# Patient Record
Sex: FEMALE | Race: WHITE | Employment: OTHER | URBAN - METROPOLITAN AREA
[De-identification: names, ages, dates, MRNs, and addresses within clinical notes are randomized per-mention and may not be internally consistent; named-entity substitution may affect disease eponyms.]

---

## 2017-05-21 PROBLEM — R07.89 ATYPICAL CHEST PAIN: Status: ACTIVE | Noted: 2017-05-21

## 2017-05-21 PROBLEM — N17.9 AKI (ACUTE KIDNEY INJURY) (HCC): Status: ACTIVE | Noted: 2017-05-21

## 2017-05-21 PROBLEM — Z79.01 CHRONIC ANTICOAGULATION: Status: ACTIVE | Noted: 2017-05-21

## 2017-05-21 PROBLEM — N30.00 ACUTE CYSTITIS WITHOUT HEMATURIA: Status: ACTIVE | Noted: 2017-05-21

## 2017-10-31 PROBLEM — G20 PARKINSON DISEASE (HCC): Status: ACTIVE | Noted: 2017-10-31

## 2017-10-31 PROBLEM — G20.A1 PARKINSON DISEASE: Status: ACTIVE | Noted: 2017-10-31

## 2018-04-01 ENCOUNTER — HOSPITAL ENCOUNTER (OUTPATIENT)
Dept: OTHER | Age: 68
Discharge: HOME | End: 2018-04-01
Attending: PSYCHIATRY & NEUROLOGY | Admitting: PSYCHIATRY & NEUROLOGY

## 2018-04-26 LAB
ALBUMIN SERPL-MCNC: 4.1 GM/DL (ref 3.4–5)
ALP BLD-CCNC: 67 IU/L (ref 40–128)
ALT SERPL-CCNC: 14 U/L (ref 10–40)
ANION GAP SERPL CALCULATED.3IONS-SCNC: 20 MMOL/L (ref 4–16)
AST SERPL-CCNC: 15 IU/L (ref 15–37)
BASOPHILS ABSOLUTE: 0.1 K/CU MM
BASOPHILS RELATIVE PERCENT: 0.8 % (ref 0–1)
BILIRUB SERPL-MCNC: 0.4 MG/DL (ref 0–1)
BUN BLDV-MCNC: 19 MG/DL (ref 6–23)
CALCIUM SERPL-MCNC: 10.1 MG/DL (ref 8.3–10.6)
CHLORIDE BLD-SCNC: 97 MMOL/L (ref 99–110)
CHOLESTEROL: 242 MG/DL
CO2: 24 MMOL/L (ref 21–32)
CREAT SERPL-MCNC: 1.1 MG/DL (ref 0.6–1.1)
DIFFERENTIAL TYPE: ABNORMAL
EOSINOPHILS ABSOLUTE: 0.2 K/CU MM
EOSINOPHILS RELATIVE PERCENT: 1.8 % (ref 0–3)
GFR AFRICAN AMERICAN: 60 ML/MIN/1.73M2
GFR NON-AFRICAN AMERICAN: 50 ML/MIN/1.73M2
GLUCOSE BLD-MCNC: 107 MG/DL (ref 70–99)
HCT VFR BLD CALC: 37.7 % (ref 37–47)
HDLC SERPL-MCNC: 47 MG/DL
HEMOGLOBIN: 12.3 GM/DL (ref 12.5–16)
IMMATURE NEUTROPHIL %: 0.4 % (ref 0–0.43)
LDL CHOLESTEROL DIRECT: 121 MG/DL
LYMPHOCYTES ABSOLUTE: 3.1 K/CU MM
LYMPHOCYTES RELATIVE PERCENT: 28.1 % (ref 24–44)
MCH RBC QN AUTO: 29.7 PG (ref 27–31)
MCHC RBC AUTO-ENTMCNC: 32.6 % (ref 32–36)
MCV RBC AUTO: 91.1 FL (ref 78–100)
MONOCYTES ABSOLUTE: 0.6 K/CU MM
MONOCYTES RELATIVE PERCENT: 5.5 % (ref 0–4)
NUCLEATED RBC %: 0 %
PDW BLD-RTO: 14.3 % (ref 11.7–14.9)
PLATELET # BLD: 322 K/CU MM (ref 140–440)
PMV BLD AUTO: 9.9 FL (ref 7.5–11.1)
POTASSIUM SERPL-SCNC: 3.5 MMOL/L (ref 3.5–5.1)
RBC # BLD: 4.14 M/CU MM (ref 4.2–5.4)
SEGMENTED NEUTROPHILS ABSOLUTE COUNT: 7.1 K/CU MM
SEGMENTED NEUTROPHILS RELATIVE PERCENT: 63.4 % (ref 36–66)
SODIUM BLD-SCNC: 141 MMOL/L (ref 135–145)
TOTAL IMMATURE NEUTOROPHIL: 0.04 K/CU MM
TOTAL NUCLEATED RBC: 0 K/CU MM
TOTAL PROTEIN: 7 GM/DL (ref 6.4–8.2)
TRIGL SERPL-MCNC: 449 MG/DL
TSH HIGH SENSITIVITY: 2.19 UIU/ML (ref 0.27–4.2)
WBC # BLD: 11.2 K/CU MM (ref 4–10.5)

## 2018-04-27 LAB
INR BLD: 1.24 INDEX
PROTHROMBIN TIME: 14.1 SECONDS (ref 9.12–12.5)

## 2018-04-29 LAB
INR BLD: 1.22 INDEX
PROTHROMBIN TIME: 13.9 SECONDS (ref 9.12–12.5)

## 2018-05-01 ENCOUNTER — HOSPITAL ENCOUNTER (OUTPATIENT)
Dept: OTHER | Age: 68
Discharge: HOME | End: 2018-05-01
Attending: PSYCHIATRY & NEUROLOGY | Admitting: PSYCHIATRY & NEUROLOGY

## 2018-05-01 LAB
INR BLD: 1.36 INDEX
PROTHROMBIN TIME: 15.7 SECONDS

## 2018-05-03 LAB
INR BLD: 1.55 INDEX
PROTHROMBIN TIME: 17.9 SECONDS

## 2018-06-01 ENCOUNTER — HOSPITAL ENCOUNTER (OUTPATIENT)
Dept: OTHER | Age: 68
Discharge: HOME | End: 2018-06-01
Attending: INTERNAL MEDICINE | Admitting: INTERNAL MEDICINE

## 2018-06-11 PROBLEM — D68.4 HYPOPROTHROMBINEMIA DUE TO COUMADIN THERAPY (HCC): Status: ACTIVE | Noted: 2018-06-11

## 2018-06-11 PROBLEM — T45.515A HYPOPROTHROMBINEMIA DUE TO COUMADIN THERAPY (HCC): Status: ACTIVE | Noted: 2018-06-11

## 2018-06-11 PROBLEM — D68.9 COAGULOPATHY (HCC): Status: ACTIVE | Noted: 2018-06-11

## 2018-06-13 PROBLEM — R53.1 WEAKNESS: Status: ACTIVE | Noted: 2018-06-13

## 2018-06-14 LAB
HCT VFR BLD CALC: 32.4 % (ref 37–47)
HEMOGLOBIN: 10.1 GM/DL (ref 12.5–16)
INR BLD: 2.36 INDEX
MCH RBC QN AUTO: 28.4 PG (ref 27–31)
MCHC RBC AUTO-ENTMCNC: 31.2 % (ref 32–36)
MCV RBC AUTO: 91 FL (ref 78–100)
PDW BLD-RTO: 15.8 % (ref 11.7–14.9)
PLATELET # BLD: 213 K/CU MM (ref 140–440)
PMV BLD AUTO: 9.8 FL (ref 7.5–11.1)
PROTHROMBIN TIME: 26.7 SECONDS (ref 10–14.3)
RBC # BLD: 3.56 M/CU MM (ref 4.2–5.4)
WBC # BLD: 7.4 K/CU MM (ref 4–10.5)

## 2018-06-14 PROCEDURE — 99306 1ST NF CARE HIGH MDM 50: CPT | Performed by: INTERNAL MEDICINE

## 2018-06-18 ENCOUNTER — HOSPITAL ENCOUNTER (OUTPATIENT)
Dept: GENERAL RADIOLOGY | Age: 68
Discharge: HOME | End: 2018-06-18
Attending: INTERNAL MEDICINE | Admitting: INTERNAL MEDICINE
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
## 2018-06-18 LAB
INR BLD: 1.84 INDEX
PROTHROMBIN TIME: 20.8 SECONDS (ref 10–14.3)

## 2018-06-21 LAB
ALBUMIN SERPL-MCNC: 3.6 GM/DL (ref 3.4–5)
ALP BLD-CCNC: 80 IU/L (ref 40–129)
ALT SERPL-CCNC: 14 U/L (ref 10–40)
ANION GAP SERPL CALCULATED.3IONS-SCNC: 15 MMOL/L (ref 4–16)
AST SERPL-CCNC: 19 IU/L (ref 15–37)
BILIRUB SERPL-MCNC: 0.3 MG/DL (ref 0–1)
BUN BLDV-MCNC: 17 MG/DL (ref 6–23)
CALCIUM SERPL-MCNC: 9.2 MG/DL (ref 8.3–10.6)
CHLORIDE BLD-SCNC: 101 MMOL/L (ref 99–110)
CO2: 25 MMOL/L (ref 21–32)
CREAT SERPL-MCNC: 1.1 MG/DL (ref 0.6–1.1)
GFR AFRICAN AMERICAN: 60 ML/MIN/1.73M2
GFR NON-AFRICAN AMERICAN: 49 ML/MIN/1.73M2
GLUCOSE BLD-MCNC: 123 MG/DL (ref 70–99)
HCT VFR BLD CALC: 31.4 % (ref 37–47)
HEMOGLOBIN: 9.7 GM/DL (ref 12.5–16)
INR BLD: 1.65 INDEX
MCH RBC QN AUTO: 28.4 PG (ref 27–31)
MCHC RBC AUTO-ENTMCNC: 30.9 % (ref 32–36)
MCV RBC AUTO: 92.1 FL (ref 78–100)
PDW BLD-RTO: 16.7 % (ref 11.7–14.9)
PLATELET # BLD: 273 K/CU MM (ref 140–440)
PMV BLD AUTO: 10.1 FL (ref 7.5–11.1)
POTASSIUM SERPL-SCNC: 3.8 MMOL/L (ref 3.5–5.1)
PROTHROMBIN TIME: 18.7 SECONDS (ref 10–14.3)
RBC # BLD: 3.41 M/CU MM (ref 4.2–5.4)
SODIUM BLD-SCNC: 141 MMOL/L (ref 135–145)
TOTAL PROTEIN: 6.7 GM/DL (ref 6.4–8.2)
WBC # BLD: 6.6 K/CU MM (ref 4–10.5)

## 2018-06-22 PROCEDURE — 99308 SBSQ NF CARE LOW MDM 20: CPT | Performed by: INTERNAL MEDICINE

## 2018-06-23 ENCOUNTER — OUTSIDE SERVICES (OUTPATIENT)
Dept: INTERNAL MEDICINE CLINIC | Age: 68
End: 2018-06-23

## 2018-06-23 DIAGNOSIS — G89.29 CHRONIC LEFT-SIDED LOW BACK PAIN WITH LEFT-SIDED SCIATICA: ICD-10-CM

## 2018-06-23 DIAGNOSIS — R29.6 MULTIPLE FALLS: ICD-10-CM

## 2018-06-23 DIAGNOSIS — Z79.01 CHRONIC ANTICOAGULATION: ICD-10-CM

## 2018-06-23 DIAGNOSIS — G20 PARKINSON DISEASE (HCC): ICD-10-CM

## 2018-06-23 DIAGNOSIS — I48.20 CHRONIC ATRIAL FIBRILLATION (HCC): Primary | Chronic | ICD-10-CM

## 2018-06-23 DIAGNOSIS — M54.42 CHRONIC LEFT-SIDED LOW BACK PAIN WITH LEFT-SIDED SCIATICA: ICD-10-CM

## 2018-06-24 ENCOUNTER — OUTSIDE SERVICES (OUTPATIENT)
Dept: INTERNAL MEDICINE CLINIC | Age: 68
End: 2018-06-24

## 2018-06-24 DIAGNOSIS — I25.10 ASHD (ARTERIOSCLEROTIC HEART DISEASE): Chronic | ICD-10-CM

## 2018-06-24 DIAGNOSIS — M54.42 CHRONIC LEFT-SIDED LOW BACK PAIN WITH LEFT-SIDED SCIATICA: ICD-10-CM

## 2018-06-24 DIAGNOSIS — R29.6 MULTIPLE FALLS: Primary | ICD-10-CM

## 2018-06-24 DIAGNOSIS — D68.4 HYPOPROTHROMBINEMIA DUE TO COUMADIN THERAPY (HCC): ICD-10-CM

## 2018-06-24 DIAGNOSIS — G89.29 CHRONIC LEFT-SIDED LOW BACK PAIN WITH LEFT-SIDED SCIATICA: ICD-10-CM

## 2018-06-24 DIAGNOSIS — I48.20 CHRONIC ATRIAL FIBRILLATION (HCC): Chronic | ICD-10-CM

## 2018-06-24 DIAGNOSIS — T45.515A HYPOPROTHROMBINEMIA DUE TO COUMADIN THERAPY (HCC): ICD-10-CM

## 2018-06-24 DIAGNOSIS — G20 PARKINSON DISEASE (HCC): ICD-10-CM

## 2018-06-25 LAB
INR BLD: 1.4 INDEX
PROTHROMBIN TIME: 15.9 SECONDS (ref 10–14.3)

## 2018-06-27 ENCOUNTER — TELEPHONE (OUTPATIENT)
Dept: INTERNAL MEDICINE CLINIC | Age: 68
End: 2018-06-27

## 2018-06-27 LAB
HCT VFR BLD CALC: 31.3 % (ref 37–47)
HEMOGLOBIN: 9.7 GM/DL (ref 12.5–16)
MCH RBC QN AUTO: 28.4 PG (ref 27–31)
MCHC RBC AUTO-ENTMCNC: 31 % (ref 32–36)
MCV RBC AUTO: 91.5 FL (ref 78–100)
PDW BLD-RTO: 17.2 % (ref 11.7–14.9)
PLATELET # BLD: 264 K/CU MM (ref 140–440)
PMV BLD AUTO: 10.3 FL (ref 7.5–11.1)
RBC # BLD: 3.42 M/CU MM (ref 4.2–5.4)
WBC # BLD: 10.1 K/CU MM (ref 4–10.5)

## 2018-06-28 LAB
INR BLD: 1.43 INDEX
PROTHROMBIN TIME: 16.3 SECONDS (ref 10–14.3)

## 2018-06-28 PROCEDURE — 99308 SBSQ NF CARE LOW MDM 20: CPT | Performed by: INTERNAL MEDICINE

## 2018-06-29 ENCOUNTER — OUTSIDE SERVICES (OUTPATIENT)
Dept: INTERNAL MEDICINE CLINIC | Age: 68
End: 2018-06-29

## 2018-06-29 DIAGNOSIS — I48.20 CHRONIC ATRIAL FIBRILLATION (HCC): Chronic | ICD-10-CM

## 2018-06-29 DIAGNOSIS — R29.6 MULTIPLE FALLS: ICD-10-CM

## 2018-06-29 DIAGNOSIS — G62.9 NEUROPATHY: ICD-10-CM

## 2018-06-29 DIAGNOSIS — G20 PARKINSON DISEASE (HCC): Primary | ICD-10-CM

## 2018-06-29 DIAGNOSIS — R60.0 PEDAL EDEMA: ICD-10-CM

## 2018-06-29 DIAGNOSIS — R53.1 WEAKNESS: ICD-10-CM

## 2018-07-01 ENCOUNTER — HOSPITAL ENCOUNTER (OUTPATIENT)
Dept: OTHER | Age: 68
Discharge: HOME | End: 2018-07-01
Attending: SKILLED NURSING FACILITY | Admitting: SKILLED NURSING FACILITY

## 2018-07-02 LAB
INR BLD: 1.71 INDEX
PROTHROMBIN TIME: 19.4 SECONDS (ref 10–14.3)

## 2018-07-03 ENCOUNTER — OUTSIDE SERVICES (OUTPATIENT)
Dept: INTERNAL MEDICINE CLINIC | Age: 68
End: 2018-07-03

## 2018-07-03 PROBLEM — N17.9 AKI (ACUTE KIDNEY INJURY) (HCC): Status: RESOLVED | Noted: 2017-05-21 | Resolved: 2018-07-03

## 2018-07-03 PROBLEM — N30.00 ACUTE CYSTITIS WITHOUT HEMATURIA: Status: RESOLVED | Noted: 2017-05-21 | Resolved: 2018-07-03

## 2018-07-05 LAB
INR BLD: 2 INDEX
PROTHROMBIN TIME: 22.6 SECONDS (ref 10–14.3)

## 2018-07-13 PROBLEM — G93.40 ENCEPHALOPATHY: Status: ACTIVE | Noted: 2018-07-13

## 2018-07-23 PROBLEM — I20.9 ANGINA PECTORIS (HCC): Status: ACTIVE | Noted: 2018-07-23

## 2018-07-23 PROBLEM — E87.6 HYPOKALEMIA: Status: ACTIVE | Noted: 2018-07-23

## 2018-07-23 PROBLEM — R06.00 DYSPNEA: Status: ACTIVE | Noted: 2018-07-23

## 2018-09-09 PROBLEM — T50.2X5A DIURETIC-INDUCED HYPOKALEMIA: Status: ACTIVE | Noted: 2018-09-09

## 2018-09-09 PROBLEM — E87.6 DIURETIC-INDUCED HYPOKALEMIA: Status: ACTIVE | Noted: 2018-09-09

## 2018-10-12 ENCOUNTER — HOSPITAL ENCOUNTER (EMERGENCY)
Age: 68
Discharge: HOME | End: 2018-10-12
Attending: EMERGENCY MEDICINE
Payer: MEDICARE

## 2018-10-12 VITALS
HEIGHT: 69 IN | TEMPERATURE: 98 F | WEIGHT: 221 LBS | OXYGEN SATURATION: 100 % | DIASTOLIC BLOOD PRESSURE: 92 MMHG | HEART RATE: 93 BPM | SYSTOLIC BLOOD PRESSURE: 199 MMHG | RESPIRATION RATE: 16 BRPM | BODY MASS INDEX: 32.73 KG/M2

## 2018-10-12 DIAGNOSIS — F41.9 ANXIETY: Primary | ICD-10-CM

## 2018-10-12 DIAGNOSIS — G47.00 ACUTE INSOMNIA: ICD-10-CM

## 2018-10-12 LAB
ALBUMIN SERPL-MCNC: 3.6 GM/DL (ref 3.4–5)
ALP BLD-CCNC: 73 IU/L (ref 40–129)
ALT SERPL-CCNC: 9 U/L (ref 10–40)
ANION GAP SERPL CALCULATED.3IONS-SCNC: 16 MMOL/L (ref 4–16)
AST SERPL-CCNC: 14 IU/L (ref 15–37)
BASOPHILS ABSOLUTE: 0.1 K/CU MM
BASOPHILS RELATIVE PERCENT: 0.6 % (ref 0–1)
BILIRUB SERPL-MCNC: 0.2 MG/DL (ref 0–1)
BUN BLDV-MCNC: 12 MG/DL (ref 6–23)
CALCIUM SERPL-MCNC: 9.5 MG/DL (ref 8.3–10.6)
CHLORIDE BLD-SCNC: 104 MMOL/L (ref 99–110)
CO2: 19 MMOL/L (ref 21–32)
CREAT SERPL-MCNC: 0.9 MG/DL (ref 0.6–1.1)
DIFFERENTIAL TYPE: ABNORMAL
EOSINOPHILS ABSOLUTE: 0.2 K/CU MM
EOSINOPHILS RELATIVE PERCENT: 1.9 % (ref 0–3)
GFR AFRICAN AMERICAN: >60 ML/MIN/1.73M2
GFR NON-AFRICAN AMERICAN: >60 ML/MIN/1.73M2
GLUCOSE BLD-MCNC: 182 MG/DL (ref 70–99)
HCT VFR BLD CALC: 34.8 % (ref 37–47)
HEMOGLOBIN: 10.6 GM/DL (ref 12.5–16)
IMMATURE NEUTROPHIL %: 0.7 % (ref 0–0.43)
LYMPHOCYTES ABSOLUTE: 1.8 K/CU MM
LYMPHOCYTES RELATIVE PERCENT: 19.9 % (ref 24–44)
MCH RBC QN AUTO: 27.2 PG (ref 27–31)
MCHC RBC AUTO-ENTMCNC: 30.5 % (ref 32–36)
MCV RBC AUTO: 89.2 FL (ref 78–100)
MONOCYTES ABSOLUTE: 0.6 K/CU MM
MONOCYTES RELATIVE PERCENT: 6.2 % (ref 0–4)
NUCLEATED RBC %: 0 %
PDW BLD-RTO: 16.5 % (ref 11.7–14.9)
PLATELET # BLD: 332 K/CU MM (ref 140–440)
PMV BLD AUTO: 9.7 FL (ref 7.5–11.1)
POTASSIUM SERPL-SCNC: 4 MMOL/L (ref 3.5–5.1)
PRO-BNP: 207.4 PG/ML
RBC # BLD: 3.9 M/CU MM (ref 4.2–5.4)
SEGMENTED NEUTROPHILS ABSOLUTE COUNT: 6.3 K/CU MM
SEGMENTED NEUTROPHILS RELATIVE PERCENT: 70.7 % (ref 36–66)
SODIUM BLD-SCNC: 139 MMOL/L (ref 135–145)
TOTAL IMMATURE NEUTOROPHIL: 0.06 K/CU MM
TOTAL NUCLEATED RBC: 0 K/CU MM
TOTAL PROTEIN: 7 GM/DL (ref 6.4–8.2)
TROPONIN T: <0.01 NG/ML
WBC # BLD: 8.9 K/CU MM (ref 4–10.5)

## 2018-10-12 PROCEDURE — 80053 COMPREHEN METABOLIC PANEL: CPT

## 2018-10-12 PROCEDURE — 93010 ELECTROCARDIOGRAM REPORT: CPT | Performed by: INTERNAL MEDICINE

## 2018-10-12 PROCEDURE — 85025 COMPLETE CBC W/AUTO DIFF WBC: CPT

## 2018-10-12 PROCEDURE — 83880 ASSAY OF NATRIURETIC PEPTIDE: CPT

## 2018-10-12 PROCEDURE — 36415 COLL VENOUS BLD VENIPUNCTURE: CPT

## 2018-10-12 PROCEDURE — 93005 ELECTROCARDIOGRAM TRACING: CPT | Performed by: EMERGENCY MEDICINE

## 2018-10-12 PROCEDURE — 6370000000 HC RX 637 (ALT 250 FOR IP): Performed by: EMERGENCY MEDICINE

## 2018-10-12 PROCEDURE — 84484 ASSAY OF TROPONIN QUANT: CPT

## 2018-10-12 PROCEDURE — 99285 EMERGENCY DEPT VISIT HI MDM: CPT

## 2018-10-12 RX ORDER — HYDROXYZINE PAMOATE 25 MG/1
25 CAPSULE ORAL NIGHTLY PRN
Qty: 6 CAPSULE | Refills: 0 | Status: SHIPPED | OUTPATIENT
Start: 2018-10-12 | End: 2018-10-18

## 2018-10-12 RX ORDER — HYDROXYZINE PAMOATE 25 MG/1
25 CAPSULE ORAL ONCE
Status: COMPLETED | OUTPATIENT
Start: 2018-10-12 | End: 2018-10-12

## 2018-10-12 RX ADMIN — HYDROXYZINE PAMOATE 25 MG: 25 CAPSULE ORAL at 12:49

## 2018-10-12 NOTE — ED NOTES
Reviewed discharge instructions with patient and family. All voice understanding/deny questions. Wheelchair offered, declines. Ambulates without difficulty.        Joceline Bourgeois RN  10/12/18 1257

## 2018-10-12 NOTE — ED PROVIDER NOTES
symptoms and medication changes. Recommend that patient return to her original dose of Seroquel 100 mg  daily and to start Vistaril 25. And to prescribe several doses until she can see her psychiatrist Dr. Oneida Ortiz next week. Clinical Impression:  1. Anxiety      Disposition referral (if applicable):  No follow-up provider specified. Disposition medications (if applicable):  New Prescriptions    No medications on file       Comment: Please note this report has been produced using speech recognition software and may contain errors related to that system including errors in grammar, punctuation, and spelling, as well as words and phrases that may be inappropriate. If there are any questions or concerns please feel free to contact the dictating provider for clarification.        Romelia Jaramillo MD  10/12/18 [address] Keli Mack MD  10/12/18 2019

## 2018-10-15 LAB
EKG ATRIAL RATE: 83 BPM
EKG DIAGNOSIS: NORMAL
EKG P AXIS: 50 DEGREES
EKG P-R INTERVAL: 134 MS
EKG Q-T INTERVAL: 382 MS
EKG QRS DURATION: 80 MS
EKG QTC CALCULATION (BAZETT): 448 MS
EKG R AXIS: -11 DEGREES
EKG T AXIS: 22 DEGREES
EKG VENTRICULAR RATE: 83 BPM

## 2018-11-03 ENCOUNTER — APPOINTMENT (OUTPATIENT)
Dept: GENERAL RADIOLOGY | Age: 68
End: 2018-11-03
Payer: MEDICARE

## 2018-11-03 ENCOUNTER — HOSPITAL ENCOUNTER (OUTPATIENT)
Age: 68
Setting detail: OBSERVATION
Discharge: HOME | End: 2018-11-05
Attending: EMERGENCY MEDICINE | Admitting: HOSPITALIST
Payer: MEDICARE

## 2018-11-03 ENCOUNTER — APPOINTMENT (OUTPATIENT)
Dept: CT IMAGING | Age: 68
End: 2018-11-03
Payer: MEDICARE

## 2018-11-03 DIAGNOSIS — R05.9 COUGH: ICD-10-CM

## 2018-11-03 DIAGNOSIS — E83.42 HYPOMAGNESEMIA: ICD-10-CM

## 2018-11-03 DIAGNOSIS — R06.2 WHEEZING: ICD-10-CM

## 2018-11-03 DIAGNOSIS — R06.00 DYSPNEA, UNSPECIFIED TYPE: ICD-10-CM

## 2018-11-03 DIAGNOSIS — R79.89 LACTIC ACID BLOOD INCREASED: ICD-10-CM

## 2018-11-03 DIAGNOSIS — R68.89 FLU-LIKE SYMPTOMS: ICD-10-CM

## 2018-11-03 DIAGNOSIS — R07.9 CHEST PAIN, UNSPECIFIED TYPE: ICD-10-CM

## 2018-11-03 DIAGNOSIS — R19.7 DIARRHEA, UNSPECIFIED TYPE: Primary | ICD-10-CM

## 2018-11-03 DIAGNOSIS — R53.1 GENERAL WEAKNESS: ICD-10-CM

## 2018-11-03 DIAGNOSIS — F41.1 ANXIETY STATE: ICD-10-CM

## 2018-11-03 DIAGNOSIS — R65.10 SIRS (SYSTEMIC INFLAMMATORY RESPONSE SYNDROME) (HCC): ICD-10-CM

## 2018-11-03 PROBLEM — R10.9 ABDOMINAL PAIN: Status: ACTIVE | Noted: 2018-11-03

## 2018-11-03 LAB
ALBUMIN SERPL-MCNC: 4.2 GM/DL (ref 3.4–5)
ALP BLD-CCNC: 76 IU/L (ref 40–129)
ALT SERPL-CCNC: 17 U/L (ref 10–40)
ANION GAP SERPL CALCULATED.3IONS-SCNC: 18 MMOL/L (ref 4–16)
AST SERPL-CCNC: 20 IU/L (ref 15–37)
BACTERIA: ABNORMAL /HPF
BASE EXCESS: 7 (ref 0–2.4)
BASOPHILS ABSOLUTE: 0.1 K/CU MM
BASOPHILS RELATIVE PERCENT: 1.1 % (ref 0–1)
BILIRUB SERPL-MCNC: 0.3 MG/DL (ref 0–1)
BILIRUBIN URINE: NEGATIVE MG/DL
BLOOD, URINE: NEGATIVE
BUN BLDV-MCNC: 10 MG/DL (ref 6–23)
CALCIUM SERPL-MCNC: 9.2 MG/DL (ref 8.3–10.6)
CHLORIDE BLD-SCNC: 106 MMOL/L (ref 99–110)
CLARITY: ABNORMAL
CO2: 15 MMOL/L (ref 21–32)
COLOR: YELLOW
CREAT SERPL-MCNC: 0.8 MG/DL (ref 0.6–1.1)
DIFFERENTIAL TYPE: ABNORMAL
EOSINOPHILS ABSOLUTE: 0.3 K/CU MM
EOSINOPHILS RELATIVE PERCENT: 3 % (ref 0–3)
GFR AFRICAN AMERICAN: >60 ML/MIN/1.73M2
GFR NON-AFRICAN AMERICAN: >60 ML/MIN/1.73M2
GLUCOSE BLD-MCNC: 139 MG/DL (ref 70–99)
GLUCOSE BLD-MCNC: 145 MG/DL (ref 70–99)
GLUCOSE BLD-MCNC: 170 MG/DL (ref 70–99)
GLUCOSE, URINE: 50 MG/DL
HCO3 VENOUS: 16.3 MMOL/L (ref 19–25)
HCT VFR BLD CALC: 38.4 % (ref 37–47)
HEMOGLOBIN: 11.7 GM/DL (ref 12.5–16)
IMMATURE NEUTROPHIL %: 0.6 % (ref 0–0.43)
INR BLD: 1.51 INDEX
KETONES, URINE: NEGATIVE MG/DL
LACTATE: 4 MMOL/L (ref 0.4–2)
LACTATE: 4.2 MMOL/L (ref 0.4–2)
LEUKOCYTE ESTERASE, URINE: ABNORMAL
LIPASE: 62 IU/L (ref 13–60)
LYMPHOCYTES ABSOLUTE: 2.8 K/CU MM
LYMPHOCYTES RELATIVE PERCENT: 32.8 % (ref 24–44)
MAGNESIUM: 1.6 MG/DL (ref 1.8–2.4)
MCH RBC QN AUTO: 27.5 PG (ref 27–31)
MCHC RBC AUTO-ENTMCNC: 30.5 % (ref 32–36)
MCV RBC AUTO: 90.4 FL (ref 78–100)
MONOCYTES ABSOLUTE: 0.5 K/CU MM
MONOCYTES RELATIVE PERCENT: 6 % (ref 0–4)
MUCUS: ABNORMAL HPF
NITRITE URINE, QUANTITATIVE: POSITIVE
NUCLEATED RBC %: 0 %
O2 SAT, VEN: 91.5 % (ref 50–70)
PCO2, VEN: 27 MMHG (ref 38–52)
PDW BLD-RTO: 18.4 % (ref 11.7–14.9)
PH VENOUS: 7.39 (ref 7.32–7.42)
PH, URINE: 5 (ref 5–8)
PLATELET # BLD: 322 K/CU MM (ref 140–440)
PMV BLD AUTO: 9.8 FL (ref 7.5–11.1)
PO2, VEN: 60 MMHG (ref 28–48)
POTASSIUM SERPL-SCNC: 3.9 MMOL/L (ref 3.5–5.1)
PRO-BNP: 330.6 PG/ML
PROTEIN UA: NEGATIVE MG/DL
PROTHROMBIN TIME: 17.1 SECONDS (ref 9.12–12.5)
RAPID INFLUENZA  B AGN: NEGATIVE
RAPID INFLUENZA A AGN: NEGATIVE
RBC # BLD: 4.25 M/CU MM (ref 4.2–5.4)
RBC URINE: 6 /HPF (ref 0–6)
SEGMENTED NEUTROPHILS ABSOLUTE COUNT: 4.8 K/CU MM
SEGMENTED NEUTROPHILS RELATIVE PERCENT: 56.5 % (ref 36–66)
SODIUM BLD-SCNC: 139 MMOL/L (ref 135–145)
SPECIFIC GRAVITY UA: 1.02 (ref 1–1.03)
SQUAMOUS EPITHELIAL: 6 /HPF
TOTAL CK: 41 IU/L (ref 26–140)
TOTAL IMMATURE NEUTOROPHIL: 0.05 K/CU MM
TOTAL NUCLEATED RBC: 0 K/CU MM
TOTAL PROTEIN: 7.5 GM/DL (ref 6.4–8.2)
TRICHOMONAS: ABNORMAL /HPF
TROPONIN T: <0.01 NG/ML
TSH HIGH SENSITIVITY: 1.22 UIU/ML (ref 0.27–4.2)
UROBILINOGEN, URINE: NORMAL MG/DL (ref 0.2–1)
WBC # BLD: 8.5 K/CU MM (ref 4–10.5)
WBC UA: 6 /HPF (ref 0–5)

## 2018-11-03 PROCEDURE — 87040 BLOOD CULTURE FOR BACTERIA: CPT

## 2018-11-03 PROCEDURE — G0378 HOSPITAL OBSERVATION PER HR: HCPCS

## 2018-11-03 PROCEDURE — 82962 GLUCOSE BLOOD TEST: CPT

## 2018-11-03 PROCEDURE — 2500000003 HC RX 250 WO HCPCS: Performed by: EMERGENCY MEDICINE

## 2018-11-03 PROCEDURE — 83735 ASSAY OF MAGNESIUM: CPT

## 2018-11-03 PROCEDURE — 83690 ASSAY OF LIPASE: CPT

## 2018-11-03 PROCEDURE — 96367 TX/PROPH/DG ADDL SEQ IV INF: CPT

## 2018-11-03 PROCEDURE — 85025 COMPLETE CBC W/AUTO DIFF WBC: CPT

## 2018-11-03 PROCEDURE — 99285 EMERGENCY DEPT VISIT HI MDM: CPT

## 2018-11-03 PROCEDURE — 81001 URINALYSIS AUTO W/SCOPE: CPT

## 2018-11-03 PROCEDURE — 6370000000 HC RX 637 (ALT 250 FOR IP): Performed by: EMERGENCY MEDICINE

## 2018-11-03 PROCEDURE — 71045 X-RAY EXAM CHEST 1 VIEW: CPT

## 2018-11-03 PROCEDURE — S0028 INJECTION, FAMOTIDINE, 20 MG: HCPCS | Performed by: EMERGENCY MEDICINE

## 2018-11-03 PROCEDURE — 83880 ASSAY OF NATRIURETIC PEPTIDE: CPT

## 2018-11-03 PROCEDURE — 83605 ASSAY OF LACTIC ACID: CPT

## 2018-11-03 PROCEDURE — 2580000003 HC RX 258: Performed by: NURSE PRACTITIONER

## 2018-11-03 PROCEDURE — 96366 THER/PROPH/DIAG IV INF ADDON: CPT

## 2018-11-03 PROCEDURE — 96372 THER/PROPH/DIAG INJ SC/IM: CPT

## 2018-11-03 PROCEDURE — 84443 ASSAY THYROID STIM HORMONE: CPT

## 2018-11-03 PROCEDURE — 96375 TX/PRO/DX INJ NEW DRUG ADDON: CPT

## 2018-11-03 PROCEDURE — 80053 COMPREHEN METABOLIC PANEL: CPT

## 2018-11-03 PROCEDURE — 6360000002 HC RX W HCPCS: Performed by: EMERGENCY MEDICINE

## 2018-11-03 PROCEDURE — 87086 URINE CULTURE/COLONY COUNT: CPT

## 2018-11-03 PROCEDURE — 93010 ELECTROCARDIOGRAM REPORT: CPT | Performed by: INTERNAL MEDICINE

## 2018-11-03 PROCEDURE — 82805 BLOOD GASES W/O2 SATURATION: CPT

## 2018-11-03 PROCEDURE — 6360000002 HC RX W HCPCS: Performed by: NURSE PRACTITIONER

## 2018-11-03 PROCEDURE — 2580000003 HC RX 258: Performed by: EMERGENCY MEDICINE

## 2018-11-03 PROCEDURE — 82550 ASSAY OF CK (CPK): CPT

## 2018-11-03 PROCEDURE — 93005 ELECTROCARDIOGRAM TRACING: CPT | Performed by: EMERGENCY MEDICINE

## 2018-11-03 PROCEDURE — 2580000003 HC RX 258: Performed by: HOSPITALIST

## 2018-11-03 PROCEDURE — 2140000000 HC CCU INTERMEDIATE R&B

## 2018-11-03 PROCEDURE — 36415 COLL VENOUS BLD VENIPUNCTURE: CPT

## 2018-11-03 PROCEDURE — 96365 THER/PROPH/DIAG IV INF INIT: CPT

## 2018-11-03 PROCEDURE — 74176 CT ABD & PELVIS W/O CONTRAST: CPT

## 2018-11-03 PROCEDURE — 85610 PROTHROMBIN TIME: CPT

## 2018-11-03 PROCEDURE — 6370000000 HC RX 637 (ALT 250 FOR IP): Performed by: NURSE PRACTITIONER

## 2018-11-03 PROCEDURE — 87804 INFLUENZA ASSAY W/OPTIC: CPT

## 2018-11-03 PROCEDURE — 84484 ASSAY OF TROPONIN QUANT: CPT

## 2018-11-03 RX ORDER — PAROXETINE 10 MG/1
20 TABLET, FILM COATED ORAL EVERY MORNING
COMMUNITY

## 2018-11-03 RX ORDER — 0.9 % SODIUM CHLORIDE 0.9 %
1000 INTRAVENOUS SOLUTION INTRAVENOUS ONCE
Status: COMPLETED | OUTPATIENT
Start: 2018-11-03 | End: 2018-11-03

## 2018-11-03 RX ORDER — DONEPEZIL HYDROCHLORIDE 10 MG/1
10 TABLET, FILM COATED ORAL NIGHTLY
COMMUNITY
Start: 2018-10-08

## 2018-11-03 RX ORDER — SODIUM CHLORIDE 9 MG/ML
INJECTION, SOLUTION INTRAVENOUS CONTINUOUS
Status: DISCONTINUED | OUTPATIENT
Start: 2018-11-03 | End: 2018-11-05 | Stop reason: HOSPADM

## 2018-11-03 RX ORDER — LOSARTAN POTASSIUM 100 MG/1
100 TABLET ORAL DAILY
Status: ON HOLD | COMMUNITY
Start: 2018-10-18 | End: 2019-04-01 | Stop reason: HOSPADM

## 2018-11-03 RX ORDER — DICYCLOMINE HYDROCHLORIDE 10 MG/ML
20 INJECTION INTRAMUSCULAR ONCE
Status: COMPLETED | OUTPATIENT
Start: 2018-11-03 | End: 2018-11-03

## 2018-11-03 RX ORDER — PROPAFENONE HYDROCHLORIDE 225 MG/1
225 CAPSULE, EXTENDED RELEASE ORAL 2 TIMES DAILY
Status: DISCONTINUED | OUTPATIENT
Start: 2018-11-03 | End: 2018-11-03

## 2018-11-03 RX ORDER — POTASSIUM CHLORIDE 750 MG/1
10 TABLET, FILM COATED, EXTENDED RELEASE ORAL DAILY
COMMUNITY
Start: 2018-10-08

## 2018-11-03 RX ORDER — CLONIDINE HYDROCHLORIDE 0.2 MG/1
0.3 TABLET ORAL 3 TIMES DAILY
Status: ON HOLD | COMMUNITY
Start: 2018-10-28 | End: 2019-04-25 | Stop reason: HOSPADM

## 2018-11-03 RX ORDER — SODIUM CHLORIDE 0.9 % (FLUSH) 0.9 %
10 SYRINGE (ML) INJECTION EVERY 12 HOURS SCHEDULED
Status: DISCONTINUED | OUTPATIENT
Start: 2018-11-03 | End: 2018-11-05 | Stop reason: HOSPADM

## 2018-11-03 RX ORDER — LOSARTAN POTASSIUM 100 MG/1
100 TABLET ORAL DAILY
Status: DISCONTINUED | OUTPATIENT
Start: 2018-11-03 | End: 2018-11-05 | Stop reason: HOSPADM

## 2018-11-03 RX ORDER — HYDROXYZINE HYDROCHLORIDE 25 MG/1
25 TABLET, FILM COATED ORAL NIGHTLY
Status: ON HOLD | COMMUNITY
End: 2018-11-03 | Stop reason: CLARIF

## 2018-11-03 RX ORDER — HYDROXYZINE PAMOATE 25 MG/1
25 CAPSULE ORAL NIGHTLY
Status: ON HOLD | COMMUNITY
End: 2019-04-01 | Stop reason: HOSPADM

## 2018-11-03 RX ORDER — QUETIAPINE FUMARATE 25 MG/1
12.5 TABLET, FILM COATED ORAL 2 TIMES DAILY
Status: ON HOLD | COMMUNITY
Start: 2018-10-10 | End: 2019-04-01 | Stop reason: HOSPADM

## 2018-11-03 RX ORDER — DEXTROSE MONOHYDRATE 50 MG/ML
100 INJECTION, SOLUTION INTRAVENOUS PRN
Status: DISCONTINUED | OUTPATIENT
Start: 2018-11-03 | End: 2018-11-05 | Stop reason: HOSPADM

## 2018-11-03 RX ORDER — MAGNESIUM SULFATE 1 G/100ML
1 INJECTION INTRAVENOUS PRN
Status: DISCONTINUED | OUTPATIENT
Start: 2018-11-03 | End: 2018-11-05 | Stop reason: HOSPADM

## 2018-11-03 RX ORDER — NICOTINE POLACRILEX 4 MG
15 LOZENGE BUCCAL PRN
Status: DISCONTINUED | OUTPATIENT
Start: 2018-11-03 | End: 2018-11-05 | Stop reason: HOSPADM

## 2018-11-03 RX ORDER — METHOCARBAMOL 500 MG/1
1500 TABLET, FILM COATED ORAL 2 TIMES DAILY
Status: DISCONTINUED | OUTPATIENT
Start: 2018-11-03 | End: 2018-11-05 | Stop reason: HOSPADM

## 2018-11-03 RX ORDER — IPRATROPIUM BROMIDE AND ALBUTEROL SULFATE 2.5; .5 MG/3ML; MG/3ML
1 SOLUTION RESPIRATORY (INHALATION) ONCE
Status: COMPLETED | OUTPATIENT
Start: 2018-11-03 | End: 2018-11-03

## 2018-11-03 RX ORDER — ONDANSETRON 2 MG/ML
4 INJECTION INTRAMUSCULAR; INTRAVENOUS EVERY 6 HOURS PRN
Status: DISCONTINUED | OUTPATIENT
Start: 2018-11-03 | End: 2018-11-05 | Stop reason: HOSPADM

## 2018-11-03 RX ORDER — NITROGLYCERIN 0.4 MG/1
0.4 TABLET SUBLINGUAL EVERY 5 MIN PRN
Status: DISCONTINUED | OUTPATIENT
Start: 2018-11-03 | End: 2018-11-05 | Stop reason: HOSPADM

## 2018-11-03 RX ORDER — MAGNESIUM SULFATE 1 G/100ML
1 INJECTION INTRAVENOUS ONCE
Status: COMPLETED | OUTPATIENT
Start: 2018-11-03 | End: 2018-11-03

## 2018-11-03 RX ORDER — POTASSIUM CHLORIDE 750 MG/1
20 TABLET, FILM COATED, EXTENDED RELEASE ORAL DAILY
Status: DISCONTINUED | OUTPATIENT
Start: 2018-11-03 | End: 2018-11-05

## 2018-11-03 RX ORDER — CARVEDILOL 12.5 MG/1
12.5 TABLET ORAL 2 TIMES DAILY WITH MEALS
Status: DISCONTINUED | OUTPATIENT
Start: 2018-11-03 | End: 2018-11-05 | Stop reason: HOSPADM

## 2018-11-03 RX ORDER — ONDANSETRON 2 MG/ML
4 INJECTION INTRAMUSCULAR; INTRAVENOUS EVERY 30 MIN PRN
Status: DISCONTINUED | OUTPATIENT
Start: 2018-11-03 | End: 2018-11-05 | Stop reason: HOSPADM

## 2018-11-03 RX ORDER — ASPIRIN 81 MG/1
324 TABLET, CHEWABLE ORAL ONCE
Status: COMPLETED | OUTPATIENT
Start: 2018-11-03 | End: 2018-11-03

## 2018-11-03 RX ORDER — METHYLPREDNISOLONE SODIUM SUCCINATE 125 MG/2ML
40 INJECTION, POWDER, LYOPHILIZED, FOR SOLUTION INTRAMUSCULAR; INTRAVENOUS ONCE
Status: COMPLETED | OUTPATIENT
Start: 2018-11-03 | End: 2018-11-03

## 2018-11-03 RX ORDER — METHOCARBAMOL 750 MG/1
1500 TABLET, FILM COATED ORAL 3 TIMES DAILY
Status: ON HOLD | COMMUNITY
Start: 2018-10-27 | End: 2019-04-01 | Stop reason: HOSPADM

## 2018-11-03 RX ORDER — WARFARIN SODIUM 4 MG/1
4 TABLET ORAL DAILY
Status: DISCONTINUED | OUTPATIENT
Start: 2018-11-03 | End: 2018-11-05 | Stop reason: HOSPADM

## 2018-11-03 RX ORDER — AMLODIPINE BESYLATE 10 MG/1
10 TABLET ORAL NIGHTLY
Status: DISCONTINUED | OUTPATIENT
Start: 2018-11-03 | End: 2018-11-05 | Stop reason: HOSPADM

## 2018-11-03 RX ORDER — CLONIDINE HYDROCHLORIDE 0.2 MG/1
0.2 TABLET ORAL 3 TIMES DAILY
Status: DISCONTINUED | OUTPATIENT
Start: 2018-11-03 | End: 2018-11-05 | Stop reason: HOSPADM

## 2018-11-03 RX ORDER — QUETIAPINE FUMARATE 100 MG/1
100 TABLET, FILM COATED ORAL NIGHTLY
COMMUNITY
Start: 2018-10-10

## 2018-11-03 RX ORDER — FENTANYL CITRATE 50 UG/ML
50 INJECTION, SOLUTION INTRAMUSCULAR; INTRAVENOUS
Status: DISCONTINUED | OUTPATIENT
Start: 2018-11-03 | End: 2018-11-05 | Stop reason: HOSPADM

## 2018-11-03 RX ORDER — CLOPIDOGREL BISULFATE 75 MG/1
75 TABLET ORAL NIGHTLY
Status: DISCONTINUED | OUTPATIENT
Start: 2018-11-03 | End: 2018-11-05 | Stop reason: HOSPADM

## 2018-11-03 RX ORDER — MAGNESIUM SULFATE 1 G/100ML
1 INJECTION INTRAVENOUS PRN
Status: DISCONTINUED | OUTPATIENT
Start: 2018-11-03 | End: 2018-11-03 | Stop reason: SDUPTHER

## 2018-11-03 RX ORDER — DEXTROSE MONOHYDRATE 25 G/50ML
12.5 INJECTION, SOLUTION INTRAVENOUS PRN
Status: DISCONTINUED | OUTPATIENT
Start: 2018-11-03 | End: 2018-11-05 | Stop reason: HOSPADM

## 2018-11-03 RX ORDER — DOCUSATE SODIUM 100 MG/1
100 CAPSULE, LIQUID FILLED ORAL 2 TIMES DAILY
Status: DISCONTINUED | OUTPATIENT
Start: 2018-11-03 | End: 2018-11-05 | Stop reason: HOSPADM

## 2018-11-03 RX ORDER — METHOCARBAMOL 750 MG/1
1500 TABLET, FILM COATED ORAL ONCE
Status: COMPLETED | OUTPATIENT
Start: 2018-11-03 | End: 2018-11-03

## 2018-11-03 RX ORDER — ALPRAZOLAM 0.25 MG/1
TABLET ORAL
Refills: 1 | COMMUNITY
Start: 2018-08-09 | End: 2018-11-03

## 2018-11-03 RX ORDER — SODIUM CHLORIDE 0.9 % (FLUSH) 0.9 %
10 SYRINGE (ML) INJECTION PRN
Status: DISCONTINUED | OUTPATIENT
Start: 2018-11-03 | End: 2018-11-05 | Stop reason: HOSPADM

## 2018-11-03 RX ORDER — ATORVASTATIN CALCIUM 10 MG/1
10 TABLET, FILM COATED ORAL NIGHTLY
Status: DISCONTINUED | OUTPATIENT
Start: 2018-11-03 | End: 2018-11-05 | Stop reason: HOSPADM

## 2018-11-03 RX ORDER — DONEPEZIL HYDROCHLORIDE 10 MG/1
10 TABLET, FILM COATED ORAL NIGHTLY
Status: DISCONTINUED | OUTPATIENT
Start: 2018-11-03 | End: 2018-11-05 | Stop reason: HOSPADM

## 2018-11-03 RX ORDER — QUETIAPINE FUMARATE 25 MG/1
25 TABLET, FILM COATED ORAL 2 TIMES DAILY
Status: DISCONTINUED | OUTPATIENT
Start: 2018-11-03 | End: 2018-11-05 | Stop reason: HOSPADM

## 2018-11-03 RX ORDER — QUETIAPINE FUMARATE 100 MG/1
100 TABLET, FILM COATED ORAL NIGHTLY
Status: DISCONTINUED | OUTPATIENT
Start: 2018-11-03 | End: 2018-11-05 | Stop reason: HOSPADM

## 2018-11-03 RX ADMIN — SODIUM CHLORIDE 1000 ML: 900 INJECTION INTRAVENOUS at 11:46

## 2018-11-03 RX ADMIN — FENTANYL CITRATE 50 MCG: 50 INJECTION INTRAMUSCULAR; INTRAVENOUS at 15:58

## 2018-11-03 RX ADMIN — METHOCARBAMOL TABLETS 1500 MG: 500 TABLET, COATED ORAL at 23:02

## 2018-11-03 RX ADMIN — SODIUM CHLORIDE 1000 ML: 900 INJECTION INTRAVENOUS at 15:58

## 2018-11-03 RX ADMIN — ASPIRIN 81 MG CHEWABLE TABLET 324 MG: 81 TABLET CHEWABLE at 15:58

## 2018-11-03 RX ADMIN — SODIUM CHLORIDE: 900 INJECTION INTRAVENOUS at 18:29

## 2018-11-03 RX ADMIN — ATORVASTATIN CALCIUM 10 MG: 10 TABLET, FILM COATED ORAL at 23:01

## 2018-11-03 RX ADMIN — INSULIN LISPRO 1 UNITS: 100 INJECTION, SOLUTION INTRAVENOUS; SUBCUTANEOUS at 17:56

## 2018-11-03 RX ADMIN — FAMOTIDINE 20 MG: 10 INJECTION, SOLUTION INTRAVENOUS at 11:47

## 2018-11-03 RX ADMIN — TAZOBACTAM SODIUM AND PIPERACILLIN SODIUM 3.38 G: 375; 3 INJECTION, SOLUTION INTRAVENOUS at 12:15

## 2018-11-03 RX ADMIN — QUETIAPINE FUMARATE 100 MG: 100 TABLET ORAL at 23:07

## 2018-11-03 RX ADMIN — MAGNESIUM SULFATE HEPTAHYDRATE 1 G: 1 INJECTION, SOLUTION INTRAVENOUS at 15:59

## 2018-11-03 RX ADMIN — CEFEPIME HYDROCHLORIDE 2 G: 2 INJECTION, POWDER, FOR SOLUTION INTRAVENOUS at 18:33

## 2018-11-03 RX ADMIN — CLOPIDOGREL BISULFATE 75 MG: 75 TABLET ORAL at 23:00

## 2018-11-03 RX ADMIN — CARBIDOPA AND LEVODOPA 0.5 TABLET: 25; 100 TABLET ORAL at 23:01

## 2018-11-03 RX ADMIN — IPRATROPIUM BROMIDE AND ALBUTEROL SULFATE 1 AMPULE: .5; 3 SOLUTION RESPIRATORY (INHALATION) at 11:50

## 2018-11-03 RX ADMIN — WARFARIN SODIUM 4 MG: 4 TABLET ORAL at 18:28

## 2018-11-03 RX ADMIN — CLONIDINE HYDROCHLORIDE 0.2 MG: 0.2 TABLET ORAL at 18:06

## 2018-11-03 RX ADMIN — METHYLPREDNISOLONE SODIUM SUCCINATE 40 MG: 125 INJECTION, POWDER, LYOPHILIZED, FOR SOLUTION INTRAMUSCULAR; INTRAVENOUS at 11:47

## 2018-11-03 RX ADMIN — QUETIAPINE FUMARATE 25 MG: 25 TABLET ORAL at 23:02

## 2018-11-03 RX ADMIN — DONEPEZIL HYDROCHLORIDE 10 MG: 10 TABLET, FILM COATED ORAL at 23:01

## 2018-11-03 RX ADMIN — AMLODIPINE BESYLATE 10 MG: 10 TABLET ORAL at 23:03

## 2018-11-03 RX ADMIN — ENOXAPARIN SODIUM 40 MG: 100 INJECTION SUBCUTANEOUS at 18:33

## 2018-11-03 RX ADMIN — LOSARTAN POTASSIUM 100 MG: 100 TABLET, FILM COATED ORAL at 18:06

## 2018-11-03 RX ADMIN — VANCOMYCIN 2000 MG: 1 INJECTION, SOLUTION INTRAVENOUS at 13:14

## 2018-11-03 RX ADMIN — METHOCARBAMOL 1500 MG: 750 TABLET ORAL at 13:17

## 2018-11-03 RX ADMIN — ONDANSETRON 4 MG: 2 INJECTION INTRAMUSCULAR; INTRAVENOUS at 11:47

## 2018-11-03 RX ADMIN — DICYCLOMINE HYDROCHLORIDE 20 MG: 20 INJECTION, SOLUTION INTRAMUSCULAR at 11:51

## 2018-11-03 ASSESSMENT — PAIN DESCRIPTION - PAIN TYPE: TYPE: CHRONIC PAIN

## 2018-11-03 ASSESSMENT — PAIN DESCRIPTION - LOCATION: LOCATION: BACK

## 2018-11-03 ASSESSMENT — PAIN SCALES - GENERAL
PAINLEVEL_OUTOF10: 9
PAINLEVEL_OUTOF10: 5

## 2018-11-03 NOTE — ED PROVIDER NOTES
[facility]      TRIAGE CHIEF COMPLAINT:   Diarrhea (7 days); Shortness of Breath (feels like afib is acting up ); and Wound Check (under abd fold)      Jamul:  Nathanael Dior is a 76 y.o. female that presents with a complaint of diarrhea for 7 days abdominal pain shortness of breath tremors. Patient has a history of anxiety, dementia she presents with chest pain shortness of breath cough abdominal pain tremors diarrhea. Denies any travel, sick contacts, recent antibiotic use. No headache no loss of consciousness. Patient appears very anxious on arrival.  No other questions or concerns poor historian. REVIEW OF SYSTEMS:  At least 10 systems reviewed and otherwise acutely negative except as in the [address] Review of Systems   Constitutional: Positive for fatigue. HENT: Positive for congestion and rhinorrhea. Eyes: Negative. Respiratory: Positive for cough, chest tightness and shortness of breath. Cardiovascular: Positive for chest pain. Gastrointestinal: Positive for abdominal pain, diarrhea and nausea. Endocrine: Negative. Genitourinary: Negative. Musculoskeletal: Positive for myalgias. Skin: Negative. Allergic/Immunologic: Negative. Neurological: Positive for dizziness and tremors. Hematological: Negative. Psychiatric/Behavioral: Positive for sleep disturbance. The patient is nervous/anxious. All other systems reviewed and are negative.       Past Medical History:   Diagnosis Date    A-fib [facility])     ACS (acute coronary syndrome) ([address] 6/16/2014    TRINO (acute kidney injury) ([address] 5/21/2017    Anxiety disorder     Arthritis     Atrial fibrillation ([address]     Blood transfusion     CAD (coronary artery disease)     \"have 6 heart stents- follows with Dr Michaela Fuentes Cerebral artery occlusion with cerebral infarction ([address]     CHF (congestive heart failure) ([address]     pt. denies this dx    Coronary syndrome, acute ([address]     lesion of lad    Depression     Diabetes mellitus [facility]     \"dx 2 yr ago\"    Dysphagia     pt is unsure about this dx with phone assessment 3/6/2017    Fall     \"fell first week of ASCSQ(9094)- reaching glass of water and fell out of bed\"\"have some bruising(on Plavix and Coumadin\" but did not hurt myself\"    Hyperlipidemia     Hypertension     Lumbar disc herniation     following with Dr Henri Rosales- they have to remove the reveal monitor to get a MRI of the back\"    Nausea & vomiting     Parkinson's disease ([facility]     Reflux      Past Surgical History:   Procedure Laterality Date    APPENDECTOMY  1964   [address]    removal of fibrosis\"right breast\"   [address]  2012    CORONARY ANGIOPLASTY WITH STENT PLACEMENT      X 2\"last time was 4-5 yrs ago\"\"total a total of 6 heart stents\"    HYSTERECTOMY  1994    \"took everything\"   [address] HISTORY  2010    reveal monitor insertion-removed in 2017   [address]     Family History   Problem Relation Age of Onset    Heart Disease Mother     High Blood Pressure Mother     Heart Disease Father      Social History     Social History    Marital status:      Spouse name: N/A    Number of children: N/A    Years of education: N/A     Occupational History    Not on file.      Social History Main Topics    Smoking status: Never Smoker    Smokeless tobacco: Never Used    Alcohol use No    Drug use: No    Sexual activity: Not on file     Other Topics Concern    Not on file     Social History Narrative    No narrative on file     Current Facility-Administered Medications   Medication Dose Route Frequency Provider Last Rate Last Dose    hydrOXYzine (VISTARIL) capsule 25 mg  25 mg Oral Nightly PRN TESS Ontiveros - CNP   25 mg at 11/04/18 2043    ondansetron (ZOFRAN) injection 4 mg  4 mg Intravenous Q30 Min PRN Tristan Hill DO   4 mg at 11/03/18 1147    fentaNYL (SUBLIMAZE) injection 50 mcg  50 mcg 4 mg Intravenous Q6H PRN [place] [employer], APRN - CNP        enoxaparin (LOVENOX) injection 40 mg  40 mg Subcutaneous Daily [place] [employer], APRN - CNP   40 mg at 11/04/18 0952    0.9 % sodium chloride infusion   Intravenous Continuous Mike Carson MD 75 mL/hr at 11/04/18 2043      insulin lispro (HUMALOG) injection vial 0-6 Units  0-6 Units Subcutaneous TID  [place] [employer], APRN - CNP   1 Units at 11/03/18 1756    insulin lispro (HUMALOG) injection vial 0-3 Units  0-3 Units Subcutaneous Nightly [place], APRN - CNP   Stopped at 11/04/18 2054    cefepime (MAXIPIME) 2 g in dextrose 5 % 50 mL IVPB  2 g Intravenous Q12H [place], APRN -  mL/hr at 11/05/18 0619 2 g at 11/05/18 4225    magnesium sulfate 1 g in dextrose 5% 100 mL IVPB  1 g Intravenous PRN Mike Carson MD        warfarin (COUMADIN) tablet 4 mg  4 mg Oral Daily [place], APRN - CNP   4 mg at 11/04/18 1636    glucose (GLUTOSE) 40 % oral gel 15 g  15 g Oral PRN Mike Carson MD        dextrose 50 % solution 12.5 g  12.5 g Intravenous PRN Mike Carson MD        glucagon (rDNA) injection 1 mg  1 mg Intramuscular PRN Mike Carson MD        dextrose 5 % solution  100 mL/hr Intravenous PRN Mike Carson MD        carvedilol (COREG) tablet 12.5 mg  12.5 mg Oral BID  Mike Carson MD   12.5 mg at 11/04/18 1636      Allergies   Allergen Reactions    Tetracyclines & Related Other (See Comments)     hallucinations    Bactrim [Sulfamethoxazole-Trimethoprim] Diarrhea    Codeine Hives     Current Facility-Administered Medications   Medication Dose Route Frequency Provider Last Rate Last Dose    hydrOXYzine (VISTARIL) capsule 25 mg  25 mg Oral Nightly PRN [place], APRN - CNP   25 mg at 11/04/18 2043    ondansetron (ZOFRAN) injection 4 mg  4 mg Intravenous Q30 Min PRN [employer], DO   4 mg at 11/03/18 1147    fentaNYL (SUBLIMAZE) injection 50 mcg  50 mcg Intravenous Q1H PRN [employer], DO   50 mcg at 11/03/18 1558    amLODIPine (NORVASC) tablet 10 mg  10 mg Oral Nightly Lb Knutson, APRN - CNP   10 mg at 11/04/18 2043    atorvastatin (LIPITOR) tablet 10 mg  10 mg Oral Nightly Lb Knutson, APRN - CNP   10 mg at 11/04/18 2043    carbidopa-levodopa (SINEMET)  MG per tablet 0.5 tablet  0.5 tablet Oral BID Lb Knutson, APRN - CNP   0.5 tablet at 11/04/18 2044    cloNIDine (CATAPRES) tablet 0.2 mg  0.2 mg Oral TID Lb Knutson APRN - CNP   0.2 mg at 11/05/18 0139    clopidogrel (PLAVIX) tablet 75 mg  75 mg Oral Nightly Lb Knutson, APRN - CNP   75 mg at 11/04/18 2043    donepezil (ARICEPT) tablet 10 mg  10 mg Oral Nightly Lb Knutson, APRN - CNP   10 mg at 11/04/18 2043    losartan (COZAAR) tablet 100 mg  100 mg Oral Daily Lb Knutson APRN - CNP   100 mg at 11/04/18 0951    nitroGLYCERIN (NITROSTAT) SL tablet 0.4 mg  0.4 mg Sublingual Q5 Min PRN Lb Knutson APRN - CNP        PARoxetine (PAXIL) tablet 50 mg  50 mg Oral QAM Lb Knutson APRN - CNP   50 mg at 11/04/18 0951    QUEtiapine (SEROQUEL) tablet 25 mg  25 mg Oral BID Lb Knutson APRN - CNP   25 mg at 11/04/18 2045    QUEtiapine (SEROQUEL) tablet 100 mg  100 mg Oral Nightly Lb Knutson, APRN - CNP   100 mg at 11/04/18 2043    methocarbamol (ROBAXIN) tablet 1,500 mg  1,500 mg Oral BID Lb Knutson APRN - CNP   1,500 mg at 11/04/18 2043    docusate sodium (COLACE) capsule 100 mg  100 mg Oral BID Lb Knutson APRN - CNP   Stopped at 11/04/18 2045    sodium chloride flush 0.9 % injection 10 mL  10 mL Intravenous 2 times per day TESS Ontiveros - CNP   10 mL at 11/04/18 0954    sodium chloride flush 0.9 % injection 10 mL  10 mL Intravenous PRN Lb Knutson, APRN - CNP        magnesium hydroxide (MILK OF MAGNESIA) 400 MG/5ML suspension 30 mL  30 mL Oral Daily PRN Lb Knutson, APRN - CNP        ondansetron (ZOFRAN) injection 4 mg  4 mg Intravenous Q6H PRN Lb Knutson, APRN - CNP Result Value Ref Range    pH, Balta 7.39 7.32 - 7.42    pCO2, Balta 27 (L) 38 - 52 mmHG    pO2, Balta 60 (H) 28 - 48 mmHG    Base Excess 7 (H) 0 - 2.4    HCO3, Venous 16.3 (L) 19 - 25 MMOL/L    O2 Sat, Balta 91.5 (H) 50 - 70 %   Lactic Acid, Plasma   Result Value Ref Range    Lactate 4.2 (HH) 0.4 - 2.0 mMOL/L   Lactic acid, plasma   Result Value Ref Range    Lactate 0.9 0.4 - 2.0 mMOL/L   Basic Metabolic Panel w/ Reflex to MG   Result Value Ref Range    Sodium 142 135 - 145 MMOL/L    Potassium 4.4 3.5 - 5.1 MMOL/L    Chloride 110 99 - 110 mMol/L    CO2 19 (L) 21 - 32 MMOL/L    Anion Gap 13 4 - 16    BUN 10 6 - 23 MG/DL    CREATININE 0.9 0.6 - 1.1 MG/DL    Glucose 109 (H) 70 - 99 MG/DL    Calcium 8.7 8.3 - 10.6 MG/DL    GFR Non-African American >60 >60 mL/min/1.73m2    GFR African American >60 >60 mL/min/1.73m2   CBC   Result Value Ref Range    WBC 8.9 4.0 - 10.5 K/CU MM    RBC 3.69 (L) 4.2 - 5.4 M/CU MM    Hemoglobin 9.9 (L) 12.5 - 16.0 GM/DL    Hematocrit 32.3 (L) 37 - 47 %    MCV 87.5 78 - 100 FL    MCH 26.8 (L) 27 - 31 PG    MCHC 30.7 (L) 32.0 - 36.0 %    RDW 18.2 (H) 11.7 - 14.9 %    Platelets 624 584 - 380 K/CU MM    MPV 9.7 7.5 - 11.1 FL   Magnesium Lab   Result Value Ref Range    Magnesium 1.8 1.8 - 2.4 mg/dl   Protime-INR   Result Value Ref Range    Protime 17.7 (H) 9.12 - 12.5 SECONDS    INR 1.56 INDEX   Protime-INR   Result Value Ref Range    Protime 18.5 (H) 9.12 - 12.5 SECONDS    INR 1.63 INDEX   POCT Glucose   Result Value Ref Range    POC Glucose 145 (H) 70 - 99 MG/DL   POCT Glucose   Result Value Ref Range    POC Glucose 139 (H) 70 - 99 MG/DL   POCT Glucose   Result Value Ref Range    POC Glucose 94 70 - 99 MG/DL   POCT Glucose   Result Value Ref Range    POC Glucose 149 (H) 70 - 99 MG/DL   POCT Glucose   Result Value Ref Range    POC Glucose 92 70 - 99 MG/DL   POCT Glucose   Result Value Ref Range    POC Glucose 89 70 - 99 MG/DL   EKG 12 Lead   Result Value Ref Range    Ventricular Rate 94 BPM    Atrial auscultation, no wheezes or rales and unlabored breathing. Capillary Refill: normal.  Radial Pulse:  present 2+. Skin:  pink and warm, normal      Emerita Stoner    Total critical care time today provided was at least 30 minutes. This excludes seperately billable procedure. Critical care time provided for reviewing labs, images consulting medicine starting fluids starting antibiotics that required close evaluation and/or intervention with concern for patient decompensation. MDM    Patient here with multiple complaints. She complains of chest pain shortness of breath abdominal pain tremors and anxiety diarrhea. Generalized weakness. On arrival she is tachycardic otherwise she appears very anxious. Patient given pain medicine nausea medicine IV fluids. Workup performed she does have some wheezing, given treatments, steroids. Has flulike symptoms. Flu is negative labs negative except for lactic acidosis. Also has low magnesium given magnesium. Imaging negative for acute problem EKG negative for acute ST elevation. I did talk to hospital medicine patient admitted for SIRS, lactic acid elevation antibiotic therapy chest pain rule out. Patient otherwise stable.     CLINICAL IMPRESSION:  Final diagnoses:   Diarrhea, unspecified type   Dyspnea, unspecified type   Cough   Wheezing   Flu-like symptoms   General weakness   Lactic acid blood increased   SIRS (systemic inflammatory response syndrome) (HCC)   Hypomagnesemia   Chest pain, unspecified type   Anxiety state       (Please note that portions of this note may have been completed with a voice recognition program. Efforts were made to edit the dictations but occasionally words aremis-transcribed.)    DISPOSITION REFERRAL (if applicable):  Alberto Montano MD  [address] [phone]  [address] (if applicable):  Current Discharge Medication List             [facility]

## 2018-11-03 NOTE — H&P
History and Physical      Name:  Anola Blunt /Age/Sex: 1950  (76 y.o. female)   MRN & CSN:  [id number] & [id number] Admission Date/Time: 11/3/2018 10:53 AM   Location:  [facility] PCP: Aixa Saucedo MD       Admitting Physicians : Dr. Zepeda Manav is a 76 y.o.  female  who presents with Diarrhea (7 days); Shortness of Breath (feels like afib is acting up ); and Wound Check (under abd fold)    Assessment and Plan:   1. Abdominal pain with nausea, vomiting, and diarrhea likely 2/2 complicated UTI  # Sepsis  # Hypomagnesemia   # Tachypnea, tachycardia, and elevated lactate  UA positive for Nitrite, bacteria, pyuria, and leukocytes. Afebrile with no leukocytosis. CT Abd No acute findings are seen to the abdomen or pelvis. Colonic diverticulosis without evidence for diverticulitis. CXR non acute  -Cefepime IV empirically  -IVF  -Magnesium replacement  -Monitor electrolytes and replete  -Stool studies  -Urine cx  -Trend lactate    2. A-fib  Subtherapeutic INR 1.51  -Rythmol  -Pharmacy to dose Coumadin  -Lovenox to bridge    3. HTN, uncontrolled likely 2/2 combination of anxiety and pain  -Norvasc, Losartan, and Clonidine  -Pain control  -Antianxiety per home regimen    4. Dementia  -Aricept    5. Chronic back pain  -Robaxin    6. PD  -Sinemet    7. DMII  -Hold oral  -Monitor FSBS and start SSI    8. CAD  -Plavix, Nitrate, and statins    9.  Depression  -Paxil and Seroquel      DVT-PPX: Lovenox and Coumadin  Diet: Advance as tolerated      Medications:   Medications:    vancomycin  2,000 mg Intravenous Once    sodium chloride  1,000 mL Intravenous Once    magnesium sulfate  1 g Intravenous Once    aspirin  324 mg Oral Once      Infusions:   PRN Meds:   ondansetron 4 mg Q30 Min PRN   fentanNYL 50 mcg Q1H PRN       Current Facility-Administered Medications:     ondansetron (ZOFRAN) injection 4 mg, 4 mg, Intravenous, Q30 Min PRN, Leroy Messer, DO, 4 mg at 18 1147    vancomycin

## 2018-11-03 NOTE — PROGRESS NOTES
Pt follows with Dr Ernesto Dunlap. Has hx of afib and states she is not taking rythmol so will dc.  Consult placed for cardiology

## 2018-11-04 LAB
ADENOVIRUS DETECTION BY PCR: NOT DETECTED
ANION GAP SERPL CALCULATED.3IONS-SCNC: 13 MMOL/L (ref 4–16)
BORDETELLA PERTUSSIS PCR: NOT DETECTED
BUN BLDV-MCNC: 10 MG/DL (ref 6–23)
CALCIUM SERPL-MCNC: 8.7 MG/DL (ref 8.3–10.6)
CHLAMYDOPHILA PNEUMONIA PCR: NOT DETECTED
CHLORIDE BLD-SCNC: 110 MMOL/L (ref 99–110)
CLOSTRIDIUM DIFFICILE, PCR: NORMAL
CO2: 19 MMOL/L (ref 21–32)
CORONAVIRUS 229E PCR: NOT DETECTED
CORONAVIRUS HKU1 PCR: NOT DETECTED
CORONAVIRUS NL63 PCR: NOT DETECTED
CORONAVIRUS OC43 PCR: NOT DETECTED
CREAT SERPL-MCNC: 0.9 MG/DL (ref 0.6–1.1)
CULTURE: NORMAL
GFR AFRICAN AMERICAN: >60 ML/MIN/1.73M2
GFR NON-AFRICAN AMERICAN: >60 ML/MIN/1.73M2
GLUCOSE BLD-MCNC: 109 MG/DL (ref 70–99)
GLUCOSE BLD-MCNC: 149 MG/DL (ref 70–99)
GLUCOSE BLD-MCNC: 89 MG/DL (ref 70–99)
GLUCOSE BLD-MCNC: 92 MG/DL (ref 70–99)
GLUCOSE BLD-MCNC: 94 MG/DL (ref 70–99)
HCT VFR BLD CALC: 32.3 % (ref 37–47)
HEMOGLOBIN: 9.9 GM/DL (ref 12.5–16)
HUMAN METAPNEUMOVIRUS PCR: NOT DETECTED
INFLUENZA A BY PCR: NOT DETECTED
INFLUENZA A H1 (2009) PCR: NOT DETECTED
INFLUENZA A H1 PANDEMIC PCR: NOT DETECTED
INFLUENZA A H3 PCR: NOT DETECTED
INFLUENZA B BY PCR: NOT DETECTED
INR BLD: 1.56 INDEX
LACTATE: 0.9 MMOL/L (ref 0.4–2)
Lab: NORMAL
MAGNESIUM: 1.8 MG/DL (ref 1.8–2.4)
MCH RBC QN AUTO: 26.8 PG (ref 27–31)
MCHC RBC AUTO-ENTMCNC: 30.7 % (ref 32–36)
MCV RBC AUTO: 87.5 FL (ref 78–100)
MYCOPLASMA PNEUMONIAE PCR: NOT DETECTED
PARAINFLUENZA 1 PCR: NOT DETECTED
PARAINFLUENZA 2 PCR: NOT DETECTED
PARAINFLUENZA 3 PCR: NOT DETECTED
PARAINFLUENZA 4 PCR: NOT DETECTED
PDW BLD-RTO: 18.2 % (ref 11.7–14.9)
PLATELET # BLD: 276 K/CU MM (ref 140–440)
PMV BLD AUTO: 9.7 FL (ref 7.5–11.1)
POTASSIUM SERPL-SCNC: 4.4 MMOL/L (ref 3.5–5.1)
PROTHROMBIN TIME: 17.7 SECONDS (ref 9.12–12.5)
RBC # BLD: 3.69 M/CU MM (ref 4.2–5.4)
REPORT STATUS: NORMAL
RHINOVIRUS ENTEROVIRUS PCR: ABNORMAL
RSV PCR: NOT DETECTED
SODIUM BLD-SCNC: 142 MMOL/L (ref 135–145)
SPECIMEN: NORMAL
WBC # BLD: 8.9 K/CU MM (ref 4–10.5)

## 2018-11-04 PROCEDURE — 83605 ASSAY OF LACTIC ACID: CPT

## 2018-11-04 PROCEDURE — 6360000002 HC RX W HCPCS: Performed by: NURSE PRACTITIONER

## 2018-11-04 PROCEDURE — 6370000000 HC RX 637 (ALT 250 FOR IP): Performed by: INTERNAL MEDICINE

## 2018-11-04 PROCEDURE — 85610 PROTHROMBIN TIME: CPT

## 2018-11-04 PROCEDURE — 85027 COMPLETE CBC AUTOMATED: CPT

## 2018-11-04 PROCEDURE — 2140000000 HC CCU INTERMEDIATE R&B

## 2018-11-04 PROCEDURE — 87329 GIARDIA AG IA: CPT

## 2018-11-04 PROCEDURE — 2580000003 HC RX 258: Performed by: NURSE PRACTITIONER

## 2018-11-04 PROCEDURE — 83735 ASSAY OF MAGNESIUM: CPT

## 2018-11-04 PROCEDURE — G0378 HOSPITAL OBSERVATION PER HR: HCPCS

## 2018-11-04 PROCEDURE — 87798 DETECT AGENT NOS DNA AMP: CPT

## 2018-11-04 PROCEDURE — 82962 GLUCOSE BLOOD TEST: CPT

## 2018-11-04 PROCEDURE — 80048 BASIC METABOLIC PNL TOTAL CA: CPT

## 2018-11-04 PROCEDURE — 87046 STOOL CULTR AEROBIC BACT EA: CPT

## 2018-11-04 PROCEDURE — 87324 CLOSTRIDIUM AG IA: CPT

## 2018-11-04 PROCEDURE — 87045 FECES CULTURE AEROBIC BACT: CPT

## 2018-11-04 PROCEDURE — 6370000000 HC RX 637 (ALT 250 FOR IP): Performed by: NURSE PRACTITIONER

## 2018-11-04 PROCEDURE — 2580000003 HC RX 258: Performed by: HOSPITALIST

## 2018-11-04 PROCEDURE — 36415 COLL VENOUS BLD VENIPUNCTURE: CPT

## 2018-11-04 PROCEDURE — 87425 ROTAVIRUS AG IA: CPT

## 2018-11-04 RX ORDER — HYDROXYZINE PAMOATE 25 MG/1
25 CAPSULE ORAL NIGHTLY PRN
Status: DISCONTINUED | OUTPATIENT
Start: 2018-11-04 | End: 2018-11-05 | Stop reason: HOSPADM

## 2018-11-04 RX ORDER — HYDROXYZINE PAMOATE 25 MG/1
25 CAPSULE ORAL NIGHTLY PRN
Status: DISCONTINUED | OUTPATIENT
Start: 2018-11-04 | End: 2018-11-04

## 2018-11-04 RX ADMIN — ENOXAPARIN SODIUM 40 MG: 100 INJECTION SUBCUTANEOUS at 09:52

## 2018-11-04 RX ADMIN — CLONIDINE HYDROCHLORIDE 0.2 MG: 0.2 TABLET ORAL at 16:36

## 2018-11-04 RX ADMIN — CEFEPIME HYDROCHLORIDE 2 G: 2 INJECTION, POWDER, FOR SOLUTION INTRAVENOUS at 16:36

## 2018-11-04 RX ADMIN — QUETIAPINE FUMARATE 25 MG: 25 TABLET ORAL at 20:45

## 2018-11-04 RX ADMIN — CLONIDINE HYDROCHLORIDE 0.2 MG: 0.2 TABLET ORAL at 01:25

## 2018-11-04 RX ADMIN — QUETIAPINE FUMARATE 25 MG: 25 TABLET ORAL at 09:51

## 2018-11-04 RX ADMIN — LOSARTAN POTASSIUM 100 MG: 100 TABLET, FILM COATED ORAL at 09:51

## 2018-11-04 RX ADMIN — CLONIDINE HYDROCHLORIDE 0.2 MG: 0.2 TABLET ORAL at 09:51

## 2018-11-04 RX ADMIN — DONEPEZIL HYDROCHLORIDE 10 MG: 10 TABLET, FILM COATED ORAL at 20:43

## 2018-11-04 RX ADMIN — WARFARIN SODIUM 4 MG: 4 TABLET ORAL at 16:36

## 2018-11-04 RX ADMIN — QUETIAPINE FUMARATE 100 MG: 100 TABLET ORAL at 20:43

## 2018-11-04 RX ADMIN — CARVEDILOL 12.5 MG: 12.5 TABLET, FILM COATED ORAL at 16:36

## 2018-11-04 RX ADMIN — SODIUM CHLORIDE, PRESERVATIVE FREE 10 ML: 5 INJECTION INTRAVENOUS at 09:54

## 2018-11-04 RX ADMIN — CARBIDOPA AND LEVODOPA 0.5 TABLET: 25; 100 TABLET ORAL at 09:52

## 2018-11-04 RX ADMIN — SODIUM CHLORIDE: 900 INJECTION INTRAVENOUS at 05:02

## 2018-11-04 RX ADMIN — METHOCARBAMOL TABLETS 1500 MG: 500 TABLET, COATED ORAL at 20:43

## 2018-11-04 RX ADMIN — CLOPIDOGREL BISULFATE 75 MG: 75 TABLET ORAL at 20:43

## 2018-11-04 RX ADMIN — CARBIDOPA AND LEVODOPA 0.5 TABLET: 25; 100 TABLET ORAL at 20:44

## 2018-11-04 RX ADMIN — HYDROXYZINE PAMOATE 25 MG: 25 CAPSULE ORAL at 01:24

## 2018-11-04 RX ADMIN — METHOCARBAMOL TABLETS 1500 MG: 500 TABLET, COATED ORAL at 09:50

## 2018-11-04 RX ADMIN — ATORVASTATIN CALCIUM 10 MG: 10 TABLET, FILM COATED ORAL at 20:43

## 2018-11-04 RX ADMIN — SODIUM CHLORIDE: 900 INJECTION INTRAVENOUS at 20:43

## 2018-11-04 RX ADMIN — AMLODIPINE BESYLATE 10 MG: 10 TABLET ORAL at 20:43

## 2018-11-04 RX ADMIN — CARVEDILOL 12.5 MG: 12.5 TABLET, FILM COATED ORAL at 09:51

## 2018-11-04 RX ADMIN — HYDROXYZINE PAMOATE 25 MG: 25 CAPSULE ORAL at 20:43

## 2018-11-04 RX ADMIN — POTASSIUM CHLORIDE 20 MEQ: 750 TABLET, FILM COATED, EXTENDED RELEASE ORAL at 09:52

## 2018-11-04 RX ADMIN — CEFEPIME HYDROCHLORIDE 2 G: 2 INJECTION, POWDER, FOR SOLUTION INTRAVENOUS at 04:59

## 2018-11-04 RX ADMIN — PAROXETINE 50 MG: 10 TABLET, FILM COATED ORAL at 09:51

## 2018-11-04 ASSESSMENT — PAIN SCALES - GENERAL
PAINLEVEL_OUTOF10: 0

## 2018-11-04 ASSESSMENT — ENCOUNTER SYMPTOMS
ALLERGIC/IMMUNOLOGIC NEGATIVE: 1
RHINORRHEA: 1
CHEST TIGHTNESS: 1
ABDOMINAL PAIN: 1
COUGH: 1
EYES NEGATIVE: 1
NAUSEA: 1
SHORTNESS OF BREATH: 1
DIARRHEA: 1

## 2018-11-04 NOTE — PROGRESS NOTES
Inna Woods is a 76 y.o. female patient has no further diarrhea.  Still feels weak    Current Facility-Administered Medications   Medication Dose Route Frequency Provider Last Rate Last Dose    hydrOXYzine (VISTARIL) capsule 25 mg  25 mg Oral Nightly PRN Jane Ee, APRN - CNP   25 mg at 11/04/18 0124    ondansetron (ZOFRAN) injection 4 mg  4 mg Intravenous Q30 Min PRN Kira Velázquez, DO   4 mg at 11/03/18 1147    fentaNYL (SUBLIMAZE) injection 50 mcg  50 mcg Intravenous Q1H PRN Tristan Hill, DO   50 mcg at 11/03/18 1558    amLODIPine (NORVASC) tablet 10 mg  10 mg Oral Nightly Jane Ee, APRN - CNP   10 mg at 11/03/18 2303    atorvastatin (LIPITOR) tablet 10 mg  10 mg Oral Nightly Jane Ee, APRN - CNP   10 mg at 11/03/18 2301    carbidopa-levodopa (SINEMET)  MG per tablet 0.5 tablet  0.5 tablet Oral BID Jane Ee, APRN - CNP   0.5 tablet at 11/03/18 2301    cloNIDine (CATAPRES) tablet 0.2 mg  0.2 mg Oral TID Jane Ee, APRN - CNP   0.2 mg at 11/04/18 0125    clopidogrel (PLAVIX) tablet 75 mg  75 mg Oral Nightly Jane Ee, APRN - CNP   75 mg at 11/03/18 2300    donepezil (ARICEPT) tablet 10 mg  10 mg Oral Nightly Jane Ee, APRN - CNP   10 mg at 11/03/18 2301    losartan (COZAAR) tablet 100 mg  100 mg Oral Daily Jane Ee, APRN - CNP   100 mg at 11/03/18 1806    nitroGLYCERIN (NITROSTAT) SL tablet 0.4 mg  0.4 mg Sublingual Q5 Min PRN Jane Ee, APRN - CNP        PARoxetine (PAXIL) tablet 50 mg  50 mg Oral QAM Jane Ee, APRN - CNP        potassium chloride (KLOR-CON) extended release tablet 20 mEq  20 mEq Oral Daily Jane Ee, APRN - CNP   Stopped at 11/03/18 1803    QUEtiapine (SEROQUEL) tablet 25 mg  25 mg Oral BID Jane Ee, APRN - CNP   25 mg at 11/03/18 2302    QUEtiapine (SEROQUEL) tablet 100 mg  100 mg Oral Nightly Jane Ee, APRN - CNP   100 mg at 11/03/18 2307    methocarbamol (ROBAXIN) tablet 1,500 mg

## 2018-11-05 VITALS
BODY MASS INDEX: 33.46 KG/M2 | RESPIRATION RATE: 13 BRPM | SYSTOLIC BLOOD PRESSURE: 122 MMHG | HEIGHT: 69 IN | HEART RATE: 58 BPM | DIASTOLIC BLOOD PRESSURE: 62 MMHG | TEMPERATURE: 98.3 F | OXYGEN SATURATION: 98 % | WEIGHT: 225.9 LBS

## 2018-11-05 LAB
GIARDIA ANTIGEN STOOL: NEGATIVE
GLUCOSE BLD-MCNC: 109 MG/DL (ref 70–99)
GLUCOSE BLD-MCNC: 126 MG/DL (ref 70–99)
INR BLD: 1.63 INDEX
PROTHROMBIN TIME: 18.5 SECONDS (ref 9.12–12.5)
ROTAVIRUS ANTIGEN: NEGATIVE

## 2018-11-05 PROCEDURE — 6370000000 HC RX 637 (ALT 250 FOR IP): Performed by: NURSE PRACTITIONER

## 2018-11-05 PROCEDURE — 36415 COLL VENOUS BLD VENIPUNCTURE: CPT

## 2018-11-05 PROCEDURE — 94761 N-INVAS EAR/PLS OXIMETRY MLT: CPT

## 2018-11-05 PROCEDURE — 85610 PROTHROMBIN TIME: CPT

## 2018-11-05 PROCEDURE — 82962 GLUCOSE BLOOD TEST: CPT

## 2018-11-05 PROCEDURE — 6370000000 HC RX 637 (ALT 250 FOR IP): Performed by: HOSPITALIST

## 2018-11-05 PROCEDURE — 2580000003 HC RX 258: Performed by: HOSPITALIST

## 2018-11-05 PROCEDURE — G0378 HOSPITAL OBSERVATION PER HR: HCPCS

## 2018-11-05 PROCEDURE — 6360000002 HC RX W HCPCS: Performed by: NURSE PRACTITIONER

## 2018-11-05 PROCEDURE — 2580000003 HC RX 258: Performed by: NURSE PRACTITIONER

## 2018-11-05 PROCEDURE — 93225 XTRNL ECG REC<48 HRS REC: CPT

## 2018-11-05 RX ORDER — ATORVASTATIN CALCIUM 10 MG/1
10 TABLET, FILM COATED ORAL NIGHTLY
Qty: 30 TABLET | Refills: 0 | Status: ON HOLD | OUTPATIENT
Start: 2018-11-05 | End: 2019-04-01 | Stop reason: HOSPADM

## 2018-11-05 RX ORDER — AMLODIPINE BESYLATE 10 MG/1
10 TABLET ORAL NIGHTLY
Qty: 30 TABLET | Refills: 0 | Status: SHIPPED | OUTPATIENT
Start: 2018-11-05

## 2018-11-05 RX ORDER — CARVEDILOL 12.5 MG/1
12.5 TABLET ORAL 2 TIMES DAILY WITH MEALS
Qty: 60 TABLET | Refills: 0 | Status: SHIPPED | OUTPATIENT
Start: 2018-11-05

## 2018-11-05 RX ADMIN — CLONIDINE HYDROCHLORIDE 0.2 MG: 0.2 TABLET ORAL at 01:39

## 2018-11-05 RX ADMIN — PAROXETINE 50 MG: 10 TABLET, FILM COATED ORAL at 09:23

## 2018-11-05 RX ADMIN — METHOCARBAMOL TABLETS 1500 MG: 500 TABLET, COATED ORAL at 09:31

## 2018-11-05 RX ADMIN — QUETIAPINE FUMARATE 25 MG: 25 TABLET ORAL at 09:51

## 2018-11-05 RX ADMIN — CARBIDOPA AND LEVODOPA 0.5 TABLET: 25; 100 TABLET ORAL at 09:33

## 2018-11-05 RX ADMIN — LOSARTAN POTASSIUM 100 MG: 100 TABLET, FILM COATED ORAL at 09:32

## 2018-11-05 RX ADMIN — SODIUM CHLORIDE: 900 INJECTION INTRAVENOUS at 10:31

## 2018-11-05 RX ADMIN — ENOXAPARIN SODIUM 40 MG: 100 INJECTION SUBCUTANEOUS at 09:23

## 2018-11-05 RX ADMIN — SODIUM CHLORIDE, PRESERVATIVE FREE 10 ML: 5 INJECTION INTRAVENOUS at 09:54

## 2018-11-05 RX ADMIN — CLONIDINE HYDROCHLORIDE 0.2 MG: 0.2 TABLET ORAL at 09:32

## 2018-11-05 RX ADMIN — CEFEPIME HYDROCHLORIDE 2 G: 2 INJECTION, POWDER, FOR SOLUTION INTRAVENOUS at 06:19

## 2018-11-05 RX ADMIN — CARVEDILOL 12.5 MG: 12.5 TABLET, FILM COATED ORAL at 09:28

## 2018-11-05 ASSESSMENT — PAIN SCALES - GENERAL
PAINLEVEL_OUTOF10: 0
PAINLEVEL_OUTOF10: 1
PAINLEVEL_OUTOF10: 0

## 2018-11-05 ASSESSMENT — PAIN DESCRIPTION - ORIENTATION: ORIENTATION: LEFT;ANTERIOR

## 2018-11-05 ASSESSMENT — PAIN DESCRIPTION - LOCATION: LOCATION: HEAD

## 2018-11-05 ASSESSMENT — PAIN DESCRIPTION - FREQUENCY: FREQUENCY: INTERMITTENT

## 2018-11-05 ASSESSMENT — PAIN DESCRIPTION - DESCRIPTORS: DESCRIPTORS: DULL;ACHING

## 2018-11-05 ASSESSMENT — PAIN DESCRIPTION - ONSET: ONSET: GRADUAL

## 2018-11-05 NOTE — CARE COORDINATION
Chart reviewed and patient appropriate for observation status only, attending physician notified and patient referred to Dr. Bonilla Ramos for code 44 status.

## 2018-11-05 NOTE — PROGRESS NOTES
Output              950 ml   Net            71.41 ml       Medications:   Scheduled Meds:   amLODIPine  10 mg Oral Nightly    atorvastatin  10 mg Oral Nightly    carbidopa-levodopa  0.5 tablet Oral BID    cloNIDine  0.2 mg Oral TID    clopidogrel  75 mg Oral Nightly    donepezil  10 mg Oral Nightly    losartan  100 mg Oral Daily    PARoxetine  50 mg Oral QAM    QUEtiapine  25 mg Oral BID    QUEtiapine  100 mg Oral Nightly    methocarbamol  1,500 mg Oral BID    docusate sodium  100 mg Oral BID    sodium chloride flush  10 mL Intravenous 2 times per day    enoxaparin  40 mg Subcutaneous Daily    insulin lispro  0-6 Units Subcutaneous TID WC    insulin lispro  0-3 Units Subcutaneous Nightly    cefepime  2 g Intravenous Q12H    warfarin  4 mg Oral Daily    carvedilol  12.5 mg Oral BID WC      Infusions:   sodium chloride 75 mL/hr at 11/04/18 2043    dextrose        PRN Meds:  hydrOXYzine, ondansetron, fentanNYL, nitroGLYCERIN, sodium chloride flush, magnesium hydroxide, ondansetron, magnesium sulfate, glucose, dextrose, glucagon (rDNA), dextrose       Physical Exam:  Vitals:    11/05/18 0403   BP: 121/60   Pulse: 78   Resp: 18   Temp: 98.2 °F (36.8 °C)   SpO2: 96%        General: AAO, NAD  Chest: Nontender  Cardiac: First and Second Heart Sounds are Normal, No Murmurs or Gallops noted  Lungs:Clear to auscultation and percussion. Abdomen: Soft, NT, ND, +BS  Extremities: No clubbing, no edema  Vascular:  Equal 2+ peripheral pulses.         Lab Data:  CBC: Recent Labs      11/03/18   1106  11/04/18   0124   WBC  8.5  8.9   HGB  11.7*  9.9*   HCT  38.4  32.3*   MCV  90.4  87.5   PLT  322  276     BMP: Recent Labs      11/03/18   1106  11/04/18   0124   NA  139  142   K  3.9  4.4   CL  106  110   CO2  15*  19*   BUN  10  10   CREATININE  0.8  0.9     LIVER PROFILE:   Recent Labs      11/03/18   1106   AST  20   ALT  17   LIPASE  62*   BILITOT  0.3   ALKPHOS  76     PT/INR:   Recent Labs      11/03/18 1106  11/04/18   0124  11/05/18   0409   PROTIME  17.1*  17.7*  18.5*   INR  1.51  1.56  1.63     APTT: No results for input(s): APTT in the last 72 hours. BNP:  No results for input(s): BNP in the last 72 hours.       Assessment:  Patient Active Problem List    Diagnosis Date Noted    Diuretic-induced hypokalemia 09/09/2018     Priority: Low    Hypokalemia 07/23/2018     Priority: Low    Dyspnea 07/23/2018     Priority: Low    Angina pectoris ([facility] 07/23/2018     Priority: Low    Encephalopathy 07/13/2018     Priority: Low    Neuropathy      Priority: Low    Pedal edema      Priority: Low    Multiple falls 06/24/2018     Priority: Low    Chronic left-sided low back pain with left-sided sciatica 06/24/2018     Priority: Low    General weakness 06/13/2018     Priority: Low    Coagulopathy ([facility] 06/11/2018     Priority: Low    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 ([facility] 06/11/2018     Priority: Low    Parkinson's disease ([facility] 10/31/2017     Priority: Low    TRINO (acute kidney injury) ([facility] 05/21/2017     Priority: Low    Chronic anticoagulation 05/21/2017     Priority: Low    Abnormal nuclear cardiac imaging test 06/16/2014     Priority: Low    ASHD (arteriosclerotic heart disease) 06/16/2014     Priority: Low    AF (atrial fibrillation) ([facility] 06/16/2014     Priority: Low    Obesity (BMI 30.0-34.9) 06/16/2014     Priority: Low    Abdominal pain 11/03/2018       Virginia Messina MD 11/5/2018 8:44 AM

## 2018-11-05 NOTE — PLAN OF CARE
Problem: Pain:  Goal: Control of acute pain  Control of acute pain   Outcome: Ongoing      Problem: Risk for Impaired Skin Integrity  Goal: Tissue integrity - skin and mucous membranes  Structural intactness and normal physiological function of skin and  mucous membranes.    Outcome: Ongoing      Problem: Falls - Risk of:  Goal: Absence of physical injury  Absence of physical injury   Outcome: Ongoing

## 2018-11-06 LAB
CULTURE: NORMAL
EKG ATRIAL RATE: 94 BPM
EKG DIAGNOSIS: NORMAL
EKG P AXIS: 62 DEGREES
EKG P-R INTERVAL: 128 MS
EKG Q-T INTERVAL: 378 MS
EKG QRS DURATION: 78 MS
EKG QTC CALCULATION (BAZETT): 472 MS
EKG R AXIS: -15 DEGREES
EKG T AXIS: 36 DEGREES
EKG VENTRICULAR RATE: 94 BPM
Lab: NORMAL
REPORT STATUS: NORMAL
SPECIMEN: NORMAL

## 2018-11-08 ENCOUNTER — HOSPITAL ENCOUNTER (EMERGENCY)
Age: 68
Discharge: HOME | End: 2018-11-08
Attending: EMERGENCY MEDICINE
Payer: MEDICARE

## 2018-11-08 ENCOUNTER — APPOINTMENT (OUTPATIENT)
Dept: GENERAL RADIOLOGY | Age: 68
End: 2018-11-08
Payer: MEDICARE

## 2018-11-08 VITALS
HEART RATE: 70 BPM | TEMPERATURE: 97.9 F | RESPIRATION RATE: 22 BRPM | BODY MASS INDEX: 32.58 KG/M2 | SYSTOLIC BLOOD PRESSURE: 139 MMHG | OXYGEN SATURATION: 99 % | HEIGHT: 69 IN | WEIGHT: 220 LBS | DIASTOLIC BLOOD PRESSURE: 54 MMHG

## 2018-11-08 DIAGNOSIS — R11.0 NAUSEA: ICD-10-CM

## 2018-11-08 DIAGNOSIS — J20.6 ACUTE BRONCHITIS DUE TO RHINOVIRUS: Primary | ICD-10-CM

## 2018-11-08 LAB
ALBUMIN SERPL-MCNC: 3.8 GM/DL (ref 3.4–5)
ALP BLD-CCNC: 67 IU/L (ref 40–129)
ALT SERPL-CCNC: <5 U/L (ref 10–40)
ANION GAP SERPL CALCULATED.3IONS-SCNC: 14 MMOL/L (ref 4–16)
AST SERPL-CCNC: 15 IU/L (ref 15–37)
BASOPHILS ABSOLUTE: 0.1 K/CU MM
BASOPHILS RELATIVE PERCENT: 1.1 % (ref 0–1)
BILIRUB SERPL-MCNC: 0.2 MG/DL (ref 0–1)
BUN BLDV-MCNC: 11 MG/DL (ref 6–23)
CALCIUM SERPL-MCNC: 9.7 MG/DL (ref 8.3–10.6)
CHLORIDE BLD-SCNC: 107 MMOL/L (ref 99–110)
CO2: 17 MMOL/L (ref 21–32)
CREAT SERPL-MCNC: 0.7 MG/DL (ref 0.6–1.1)
CULTURE: NORMAL
CULTURE: NORMAL
DIFFERENTIAL TYPE: ABNORMAL
EOSINOPHILS ABSOLUTE: 0.3 K/CU MM
EOSINOPHILS RELATIVE PERCENT: 4.1 % (ref 0–3)
GFR AFRICAN AMERICAN: >60 ML/MIN/1.73M2
GFR NON-AFRICAN AMERICAN: >60 ML/MIN/1.73M2
GLUCOSE BLD-MCNC: 136 MG/DL (ref 70–99)
HCT VFR BLD CALC: 34.8 % (ref 37–47)
HEMOGLOBIN: 10.9 GM/DL (ref 12.5–16)
IMMATURE NEUTROPHIL %: 0.3 % (ref 0–0.43)
LYMPHOCYTES ABSOLUTE: 2.3 K/CU MM
LYMPHOCYTES RELATIVE PERCENT: 31.8 % (ref 24–44)
Lab: NORMAL
Lab: NORMAL
MAGNESIUM: 1.7 MG/DL (ref 1.8–2.4)
MCH RBC QN AUTO: 27.7 PG (ref 27–31)
MCHC RBC AUTO-ENTMCNC: 31.3 % (ref 32–36)
MCV RBC AUTO: 88.3 FL (ref 78–100)
MONOCYTES ABSOLUTE: 0.4 K/CU MM
MONOCYTES RELATIVE PERCENT: 5.8 % (ref 0–4)
NUCLEATED RBC %: 0 %
PDW BLD-RTO: 18 % (ref 11.7–14.9)
PHOSPHORUS: 2.8 MG/DL (ref 2.5–4.9)
PLATELET # BLD: 275 K/CU MM (ref 140–440)
PMV BLD AUTO: 10 FL (ref 7.5–11.1)
POTASSIUM SERPL-SCNC: 4 MMOL/L (ref 3.5–5.1)
PRO-BNP: 231.5 PG/ML
RBC # BLD: 3.94 M/CU MM (ref 4.2–5.4)
REPORT STATUS: NORMAL
REPORT STATUS: NORMAL
SEGMENTED NEUTROPHILS ABSOLUTE COUNT: 4 K/CU MM
SEGMENTED NEUTROPHILS RELATIVE PERCENT: 56.9 % (ref 36–66)
SODIUM BLD-SCNC: 138 MMOL/L (ref 135–145)
SPECIMEN: NORMAL
SPECIMEN: NORMAL
TOTAL IMMATURE NEUTOROPHIL: 0.02 K/CU MM
TOTAL NUCLEATED RBC: 0 K/CU MM
TOTAL PROTEIN: 7 GM/DL (ref 6.4–8.2)
TROPONIN T: <0.01 NG/ML
TSH HIGH SENSITIVITY: 1.07 UIU/ML (ref 0.27–4.2)
WBC # BLD: 7.1 K/CU MM (ref 4–10.5)

## 2018-11-08 PROCEDURE — 83735 ASSAY OF MAGNESIUM: CPT

## 2018-11-08 PROCEDURE — 71045 X-RAY EXAM CHEST 1 VIEW: CPT

## 2018-11-08 PROCEDURE — 83880 ASSAY OF NATRIURETIC PEPTIDE: CPT

## 2018-11-08 PROCEDURE — 6360000002 HC RX W HCPCS: Performed by: EMERGENCY MEDICINE

## 2018-11-08 PROCEDURE — 2580000003 HC RX 258: Performed by: EMERGENCY MEDICINE

## 2018-11-08 PROCEDURE — 6370000000 HC RX 637 (ALT 250 FOR IP): Performed by: EMERGENCY MEDICINE

## 2018-11-08 PROCEDURE — 84100 ASSAY OF PHOSPHORUS: CPT

## 2018-11-08 PROCEDURE — 84443 ASSAY THYROID STIM HORMONE: CPT

## 2018-11-08 PROCEDURE — 36415 COLL VENOUS BLD VENIPUNCTURE: CPT

## 2018-11-08 PROCEDURE — 94640 AIRWAY INHALATION TREATMENT: CPT

## 2018-11-08 PROCEDURE — 93005 ELECTROCARDIOGRAM TRACING: CPT | Performed by: EMERGENCY MEDICINE

## 2018-11-08 PROCEDURE — 93010 ELECTROCARDIOGRAM REPORT: CPT | Performed by: INTERNAL MEDICINE

## 2018-11-08 PROCEDURE — 99285 EMERGENCY DEPT VISIT HI MDM: CPT

## 2018-11-08 PROCEDURE — 94761 N-INVAS EAR/PLS OXIMETRY MLT: CPT

## 2018-11-08 PROCEDURE — 84484 ASSAY OF TROPONIN QUANT: CPT

## 2018-11-08 PROCEDURE — 85025 COMPLETE CBC W/AUTO DIFF WBC: CPT

## 2018-11-08 PROCEDURE — 80053 COMPREHEN METABOLIC PANEL: CPT

## 2018-11-08 RX ORDER — 0.9 % SODIUM CHLORIDE 0.9 %
1000 INTRAVENOUS SOLUTION INTRAVENOUS ONCE
Status: DISCONTINUED | OUTPATIENT
Start: 2018-11-08 | End: 2018-11-08 | Stop reason: HOSPADM

## 2018-11-08 RX ORDER — MAGNESIUM SULFATE 1 G/100ML
1 INJECTION INTRAVENOUS ONCE
Status: DISCONTINUED | OUTPATIENT
Start: 2018-11-08 | End: 2018-11-08 | Stop reason: HOSPADM

## 2018-11-08 RX ORDER — ALBUTEROL SULFATE 90 UG/1
2 AEROSOL, METERED RESPIRATORY (INHALATION) 4 TIMES DAILY PRN
Qty: 3 INHALER | Refills: 1 | Status: SHIPPED | OUTPATIENT
Start: 2018-11-08

## 2018-11-08 RX ORDER — ONDANSETRON 4 MG/1
4 TABLET, ORALLY DISINTEGRATING ORAL EVERY 8 HOURS PRN
Qty: 15 TABLET | Refills: 0 | Status: SHIPPED | OUTPATIENT
Start: 2018-11-08

## 2018-11-08 RX ORDER — BENZONATATE 100 MG/1
100 CAPSULE ORAL 3 TIMES DAILY PRN
Qty: 30 CAPSULE | Refills: 0 | Status: SHIPPED | OUTPATIENT
Start: 2018-11-08 | End: 2018-11-15

## 2018-11-08 RX ORDER — IPRATROPIUM BROMIDE AND ALBUTEROL SULFATE 2.5; .5 MG/3ML; MG/3ML
3 SOLUTION RESPIRATORY (INHALATION) ONCE
Status: COMPLETED | OUTPATIENT
Start: 2018-11-08 | End: 2018-11-08

## 2018-11-08 RX ADMIN — IPRATROPIUM BROMIDE AND ALBUTEROL SULFATE 3 AMPULE: .5; 3 SOLUTION RESPIRATORY (INHALATION) at 03:00

## 2018-11-08 NOTE — ED PROVIDER NOTES
 CAD (coronary artery disease)     \"have 6 heart stents- follows with Dr Peterson Paredes Cerebral artery occlusion with cerebral infarction [facility])     CHF (congestive heart failure) ([facility]     pt. denies this dx    Coronary syndrome, acute ([facility] [address]     lesion of lad    Depression     Diabetes mellitus ([facility] [address]     \"dx 2 yr ago\"    Dysphagia     pt is unsure about this dx with phone assessment 3/6/2017    Fall     \"fell first week of [facility]CEY(7738)- reaching glass of water and fell out of bed\"\"have some bruising(on Plavix and Coumadin\" but did not hurt myself\"    Hyperlipidemia     Hypertension     Lumbar disc herniation     following with Dr Jeannette Rice- they have to remove the reveal monitor to get a MRI of the back\"    Nausea & vomiting     Parkinson's disease ([facility] [address] [facility]     Reflux        CURRENT MEDICATIONS:   Home medications reviewed. SURGICAL HISTORY:   Past Surgical History:   Procedure Laterality Date    APPENDECTOMY  1964    BREAST SURGERY  1995    removal of fibrosis\"right breast\"   [address]  2012    CORONARY ANGIOPLASTY WITH STENT PLACEMENT      X 2\"last time was 4-5 yrs ago\"\"total a total of 6 heart stents\"    HYSTERECTOMY  1994    \"took everything\"   [place] OTHER SURGICAL HISTORY  2010    reveal monitor insertion-removed in 2017    TONSILLECTOMY  1955       FAMILY HISTORY:   Family History   Problem Relation Age of Onset    Heart Disease Mother     High Blood Pressure Mother     Heart Disease Father        SOCIAL HISTORY:   Social History     Social History    Marital status:      Spouse name: N/A    Number of children: N/A    Years of education: N/A     Occupational History    Not on file.      Social History Main Topics    Smoking status: Never Smoker    Smokeless tobacco: Never Used    Alcohol use No    Drug use: No    Sexual activity: Not on file     Other Topics Concern    Not on file     Social History Narrative    No narrative on file       ALLERGIES: Tetracyclines & related; Bactrim [sulfamethoxazole-trimethoprim]; and Codeine    PHYSICAL EXAM:  VITAL SIGNS:   ED Triage Vitals [11/08/18 1150]   Enc Vitals Group      BP (!) 151/81      Pulse 74      Resp 18      Temp 97.9 °F (36.6 °C)      Temp Source Oral      SpO2 99 %      Weight 220 lb (99.8 kg)      Height 5' 9\" (1.753 m)      Head Circumference       Peak Flow       Pain Score       Pain Loc       Pain Edu? Excl. in [address]? Constitutional:  Non-toxic appearance  HENT: Normocephalic, Atraumatic, Bilateral external ears normal, Oropharynx moist, No oral exudates, Nose normal.  Eyes:  PERRL, Conjunctiva normal, No discharge. Neck: Normal range of motion, No tenderness, Supple, No stridor, No lymphadenopathy. Cardiovascular:  Normal heart rate, Normal rhythm  Pulmonary/Chest: Scattered wheezing bilaterally, no respiratory distress, no dyspnea, no tachypnea  Abdomen: Bowel sounds normal, Soft, No tenderness, No masses, No pulsatile masses  Back:  No tenderness, No CVA tenderness  Extremities:  Normal range of motion, Intact distal pulses, No edema, No tenderness  Skin:  Warm, Dry, No erythema, No rash    EKG Interpretation  Interpreted by me  Compared to 11/3/18  Rhythm: normal sinus  Rate: normal 74  Axis: normal  Ectopy: none  Conduction: normal  ST Segments: no acute change  T Waves: no acute change  Q Waves: none  Clinical Impression: normal sinus rhythm, no acute changes    Cardiac Monitor Strip Interpretation  Interpreted by me  Monitor strip interpreted for greater than 10 seconds  Rhythm: normal sinus  Rate: normal  Ectopy: none  ST Segments: normal      Radiology / Procedures:  XR CHEST PORTABLE (Final result)   Result time 11/08/18 12:29:10   Final result by Jarek Rausch MD (11/08/18 12:29:10)                Impression:    1. No acute cardiopulmonary disease.             Narrative:    EXAMINATION:  SINGLE XRAY VIEW OF THE CHEST    11/8/2018 12:03

## 2018-11-09 LAB
EKG ATRIAL RATE: 74 BPM
EKG DIAGNOSIS: NORMAL
EKG P AXIS: 40 DEGREES
EKG P-R INTERVAL: 134 MS
EKG Q-T INTERVAL: 386 MS
EKG QRS DURATION: 76 MS
EKG QTC CALCULATION (BAZETT): 428 MS
EKG R AXIS: -17 DEGREES
EKG T AXIS: 2 DEGREES
EKG VENTRICULAR RATE: 74 BPM

## 2018-11-15 LAB
ACQUISITION DURATION: NORMAL S
AVERAGE HEART RATE: 70 BPM
EKG DIAGNOSIS: NORMAL
FASTEST SUPRAVENTRICULAR RATE: 174 BPM
HOOKUP DATE: NORMAL
HOOKUP TIME: NORMAL
LONGEST SUPRAVENTRICULAR RATE: 174 BPM
MAX HEART RATE TIME/DATE: NORMAL
MAX HEART RATE: 126 BPM
MIN HEART RATE TIME/DATE: NORMAL
MIN HEART RATE: 52 BPM
NUMBER OF FASTEST SUPRAVENTRICULAR BEATS: 3
NUMBER OF LONGEST SUPRAVENTRICULAR BEATS: 3
NUMBER OF QRS COMPLEXES: NORMAL
NUMBER OF SUPRAVENTRICULAR BEATS IN RUNS: 6
NUMBER OF SUPRAVENTRICULAR COUPLETS: 5
NUMBER OF SUPRAVENTRICULAR ECTOPICS: 68
NUMBER OF SUPRAVENTRICULAR ISOLATED BEATS: 52
NUMBER OF SUPRAVENTRICULAR RUNS: 2
NUMBER OF VENTRICULAR BEATS IN RUNS: 0
NUMBER OF VENTRICULAR BIGEMINAL CYCLES: 0
NUMBER OF VENTRICULAR COUPLETS: 6
NUMBER OF VENTRICULAR ECTOPICS: 1297
NUMBER OF VENTRICULAR ISOLATED BEATS: 1285
NUMBER OF VENTRICULAR RUNS: 0

## 2018-11-19 ENCOUNTER — APPOINTMENT (OUTPATIENT)
Dept: CT IMAGING | Age: 68
End: 2018-11-19
Payer: MEDICARE

## 2018-11-19 ENCOUNTER — HOSPITAL ENCOUNTER (EMERGENCY)
Age: 68
Discharge: HOME | End: 2018-11-19
Attending: EMERGENCY MEDICINE
Payer: MEDICARE

## 2018-11-19 ENCOUNTER — APPOINTMENT (OUTPATIENT)
Dept: GENERAL RADIOLOGY | Age: 68
End: 2018-11-19
Payer: MEDICARE

## 2018-11-19 VITALS
BODY MASS INDEX: 32.58 KG/M2 | HEIGHT: 69 IN | DIASTOLIC BLOOD PRESSURE: 74 MMHG | WEIGHT: 220 LBS | RESPIRATION RATE: 11 BRPM | HEART RATE: 73 BPM | OXYGEN SATURATION: 97 % | TEMPERATURE: 98.2 F | SYSTOLIC BLOOD PRESSURE: 177 MMHG

## 2018-11-19 DIAGNOSIS — E86.0 DEHYDRATION: ICD-10-CM

## 2018-11-19 DIAGNOSIS — I10 ESSENTIAL HYPERTENSION: ICD-10-CM

## 2018-11-19 DIAGNOSIS — R10.9 FLANK PAIN: Primary | ICD-10-CM

## 2018-11-19 DIAGNOSIS — R11.0 NAUSEA: ICD-10-CM

## 2018-11-19 LAB
ALBUMIN SERPL-MCNC: 4.2 GM/DL (ref 3.4–5)
ALP BLD-CCNC: 74 IU/L (ref 40–128)
ALT SERPL-CCNC: 18 U/L (ref 10–40)
ANION GAP SERPL CALCULATED.3IONS-SCNC: 16 MMOL/L (ref 4–16)
AST SERPL-CCNC: 27 IU/L (ref 15–37)
BACTERIA: ABNORMAL /HPF
BASOPHILS ABSOLUTE: 0.1 K/CU MM
BASOPHILS RELATIVE PERCENT: 0.9 % (ref 0–1)
BILIRUB SERPL-MCNC: 0.4 MG/DL (ref 0–1)
BILIRUBIN URINE: NEGATIVE MG/DL
BLOOD, URINE: NEGATIVE
BUN BLDV-MCNC: 9 MG/DL (ref 6–23)
CALCIUM SERPL-MCNC: 9.7 MG/DL (ref 8.3–10.6)
CHLORIDE BLD-SCNC: 105 MMOL/L (ref 99–110)
CLARITY: CLEAR
CO2: 18 MMOL/L (ref 21–32)
COLOR: YELLOW
CREAT SERPL-MCNC: 0.9 MG/DL (ref 0.6–1.1)
DIFFERENTIAL TYPE: ABNORMAL
EOSINOPHILS ABSOLUTE: 0.3 K/CU MM
EOSINOPHILS RELATIVE PERCENT: 2.6 % (ref 0–3)
GFR AFRICAN AMERICAN: >60 ML/MIN/1.73M2
GFR NON-AFRICAN AMERICAN: >60 ML/MIN/1.73M2
GLUCOSE BLD-MCNC: 119 MG/DL (ref 70–99)
GLUCOSE, URINE: NEGATIVE MG/DL
HCT VFR BLD CALC: 36.5 % (ref 37–47)
HEMOGLOBIN: 11.8 GM/DL (ref 12.5–16)
IMMATURE NEUTROPHIL %: 0.5 % (ref 0–0.43)
INR BLD: 1.69 INDEX
KETONES, URINE: NEGATIVE MG/DL
LACTATE: 1.8 MMOL/L (ref 0.4–2)
LACTATE: 2.9 MMOL/L (ref 0.4–2)
LEUKOCYTE ESTERASE, URINE: ABNORMAL
LIPASE: 43 IU/L (ref 13–60)
LYMPHOCYTES ABSOLUTE: 2.7 K/CU MM
LYMPHOCYTES RELATIVE PERCENT: 25.1 % (ref 24–44)
MCH RBC QN AUTO: 28.3 PG (ref 27–31)
MCHC RBC AUTO-ENTMCNC: 32.3 % (ref 32–36)
MCV RBC AUTO: 87.5 FL (ref 78–100)
MONOCYTES ABSOLUTE: 0.7 K/CU MM
MONOCYTES RELATIVE PERCENT: 6.7 % (ref 0–4)
MUCUS: ABNORMAL HPF
NITRITE URINE, QUANTITATIVE: NEGATIVE
NUCLEATED RBC %: 0 %
PDW BLD-RTO: 18.2 % (ref 11.7–14.9)
PH, URINE: 5 (ref 5–8)
PLATELET # BLD: 330 K/CU MM (ref 140–440)
PMV BLD AUTO: 9.9 FL (ref 7.5–11.1)
POTASSIUM SERPL-SCNC: 4.2 MMOL/L (ref 3.5–5.1)
PROTEIN UA: NEGATIVE MG/DL
PROTHROMBIN TIME: 19.5 SECONDS (ref 9.12–12.5)
RAPID INFLUENZA  B AGN: NEGATIVE
RAPID INFLUENZA A AGN: NEGATIVE
RBC # BLD: 4.17 M/CU MM (ref 4.2–5.4)
RBC URINE: 1 /HPF (ref 0–6)
RENAL EPITHELIAL, UA: 1 /HPF
SEGMENTED NEUTROPHILS ABSOLUTE COUNT: 6.8 K/CU MM
SEGMENTED NEUTROPHILS RELATIVE PERCENT: 64.2 % (ref 36–66)
SODIUM BLD-SCNC: 139 MMOL/L (ref 135–145)
SPECIFIC GRAVITY UA: 1.01 (ref 1–1.03)
SQUAMOUS EPITHELIAL: 2 /HPF
TOTAL IMMATURE NEUTOROPHIL: 0.05 K/CU MM
TOTAL NUCLEATED RBC: 0 K/CU MM
TOTAL PROTEIN: 7.5 GM/DL (ref 6.4–8.2)
TRICHOMONAS: ABNORMAL /HPF
TROPONIN T: <0.01 NG/ML
UROBILINOGEN, URINE: NORMAL MG/DL (ref 0.2–1)
WBC # BLD: 10.7 K/CU MM (ref 4–10.5)
WBC UA: 2 /HPF (ref 0–5)

## 2018-11-19 PROCEDURE — 81001 URINALYSIS AUTO W/SCOPE: CPT

## 2018-11-19 PROCEDURE — 2580000003 HC RX 258: Performed by: EMERGENCY MEDICINE

## 2018-11-19 PROCEDURE — 85610 PROTHROMBIN TIME: CPT

## 2018-11-19 PROCEDURE — 6360000004 HC RX CONTRAST MEDICATION: Performed by: EMERGENCY MEDICINE

## 2018-11-19 PROCEDURE — 84484 ASSAY OF TROPONIN QUANT: CPT

## 2018-11-19 PROCEDURE — 80053 COMPREHEN METABOLIC PANEL: CPT

## 2018-11-19 PROCEDURE — 83605 ASSAY OF LACTIC ACID: CPT

## 2018-11-19 PROCEDURE — 99285 EMERGENCY DEPT VISIT HI MDM: CPT

## 2018-11-19 PROCEDURE — 71046 X-RAY EXAM CHEST 2 VIEWS: CPT

## 2018-11-19 PROCEDURE — 36415 COLL VENOUS BLD VENIPUNCTURE: CPT

## 2018-11-19 PROCEDURE — 6370000000 HC RX 637 (ALT 250 FOR IP): Performed by: EMERGENCY MEDICINE

## 2018-11-19 PROCEDURE — 83690 ASSAY OF LIPASE: CPT

## 2018-11-19 PROCEDURE — 85025 COMPLETE CBC W/AUTO DIFF WBC: CPT

## 2018-11-19 PROCEDURE — 74177 CT ABD & PELVIS W/CONTRAST: CPT

## 2018-11-19 PROCEDURE — 93005 ELECTROCARDIOGRAM TRACING: CPT | Performed by: EMERGENCY MEDICINE

## 2018-11-19 PROCEDURE — 87804 INFLUENZA ASSAY W/OPTIC: CPT

## 2018-11-19 RX ORDER — WARFARIN SODIUM 4 MG/1
4 TABLET ORAL
Status: COMPLETED | OUTPATIENT
Start: 2018-11-19 | End: 2018-11-19

## 2018-11-19 RX ORDER — AMLODIPINE BESYLATE 5 MG/1
10 TABLET ORAL ONCE
Status: COMPLETED | OUTPATIENT
Start: 2018-11-19 | End: 2018-11-19

## 2018-11-19 RX ORDER — 0.9 % SODIUM CHLORIDE 0.9 %
1000 INTRAVENOUS SOLUTION INTRAVENOUS ONCE
Status: COMPLETED | OUTPATIENT
Start: 2018-11-19 | End: 2018-11-19

## 2018-11-19 RX ORDER — 0.9 % SODIUM CHLORIDE 0.9 %
10 VIAL (ML) INJECTION PRN
Status: DISCONTINUED | OUTPATIENT
Start: 2018-11-19 | End: 2018-11-19 | Stop reason: HOSPADM

## 2018-11-19 RX ORDER — WARFARIN SODIUM 4 MG/1
TABLET ORAL
Qty: 15 TABLET | Refills: 0 | Status: SHIPPED | OUTPATIENT
Start: 2018-11-19

## 2018-11-19 RX ORDER — CLONIDINE HYDROCHLORIDE 0.1 MG/1
0.2 TABLET ORAL ONCE
Status: COMPLETED | OUTPATIENT
Start: 2018-11-19 | End: 2018-11-19

## 2018-11-19 RX ORDER — CARVEDILOL 12.5 MG/1
12.5 TABLET ORAL ONCE
Status: COMPLETED | OUTPATIENT
Start: 2018-11-19 | End: 2018-11-19

## 2018-11-19 RX ORDER — CLONIDINE HYDROCHLORIDE 0.1 MG/1
0.2 TABLET ORAL 2 TIMES DAILY
Qty: 30 TABLET | Refills: 0 | Status: ON HOLD | OUTPATIENT
Start: 2018-11-19 | End: 2019-04-01 | Stop reason: HOSPADM

## 2018-11-19 RX ORDER — CLONIDINE HYDROCHLORIDE 0.2 MG/1
0.2 TABLET ORAL 3 TIMES DAILY PRN
Qty: 30 TABLET | Refills: 0 | Status: ON HOLD | OUTPATIENT
Start: 2018-11-19 | End: 2019-04-01 | Stop reason: HOSPADM

## 2018-11-19 RX ADMIN — WARFARIN SODIUM 4 MG: 4 TABLET ORAL at 15:54

## 2018-11-19 RX ADMIN — CLONIDINE HYDROCHLORIDE 0.2 MG: 0.1 TABLET ORAL at 14:17

## 2018-11-19 RX ADMIN — SODIUM CHLORIDE 1000 ML: 9 INJECTION, SOLUTION INTRAVENOUS at 11:54

## 2018-11-19 RX ADMIN — IOPAMIDOL 80 ML: 755 INJECTION, SOLUTION INTRAVENOUS at 12:27

## 2018-11-19 RX ADMIN — CARVEDILOL 12.5 MG: 12.5 TABLET, FILM COATED ORAL at 12:03

## 2018-11-19 RX ADMIN — AMLODIPINE BESYLATE 10 MG: 5 TABLET ORAL at 11:54

## 2018-11-20 LAB
EKG ATRIAL RATE: 78 BPM
EKG DIAGNOSIS: NORMAL
EKG P AXIS: 51 DEGREES
EKG P-R INTERVAL: 136 MS
EKG Q-T INTERVAL: 410 MS
EKG QRS DURATION: 78 MS
EKG QTC CALCULATION (BAZETT): 467 MS
EKG R AXIS: -21 DEGREES
EKG T AXIS: 12 DEGREES
EKG VENTRICULAR RATE: 78 BPM

## 2019-02-28 ENCOUNTER — APPOINTMENT (OUTPATIENT)
Dept: GENERAL RADIOLOGY | Facility: HOSPITAL | Age: 69
End: 2019-02-28

## 2019-02-28 ENCOUNTER — HOSPITAL ENCOUNTER (EMERGENCY)
Facility: HOSPITAL | Age: 69
Discharge: HOME | End: 2019-02-28
Attending: EMERGENCY MEDICINE | Admitting: EMERGENCY MEDICINE

## 2019-02-28 ENCOUNTER — APPOINTMENT (OUTPATIENT)
Dept: CT IMAGING | Facility: HOSPITAL | Age: 69
End: 2019-02-28

## 2019-02-28 VITALS
HEIGHT: 66 IN | RESPIRATION RATE: 18 BRPM | TEMPERATURE: 98 F | OXYGEN SATURATION: 99 % | WEIGHT: 202 LBS | BODY MASS INDEX: 32.47 KG/M2 | HEART RATE: 61 BPM | DIASTOLIC BLOOD PRESSURE: 77 MMHG | SYSTOLIC BLOOD PRESSURE: 151 MMHG

## 2019-02-28 DIAGNOSIS — F41.9 ACUTE ANXIETY: Primary | ICD-10-CM

## 2019-02-28 LAB
A-A DO2: 22 MMHG (ref 0–300)
ALBUMIN SERPL-MCNC: 4.5 G/DL (ref 3.4–4.8)
ALBUMIN/GLOB SERPL: 1.6 G/DL (ref 1.5–2.5)
ALP SERPL-CCNC: 92 U/L (ref 35–104)
ALT SERPL W P-5'-P-CCNC: 20 U/L (ref 10–36)
AMYLASE SERPL-CCNC: 53 U/L (ref 28–100)
ANION GAP SERPL CALCULATED.3IONS-SCNC: 10.7 MMOL/L (ref 3.6–11.2)
APTT PPP: 36.3 SECONDS (ref 23.8–36.1)
ARTERIAL PATENCY WRIST A: ABNORMAL
AST SERPL-CCNC: 26 U/L (ref 10–30)
ATMOSPHERIC PRESS: 729 MMHG
BACTERIA UR QL AUTO: ABNORMAL /HPF
BASE EXCESS BLDA CALC-SCNC: -2.6 MMOL/L
BASOPHILS # BLD AUTO: 0.06 10*3/MM3 (ref 0–0.3)
BASOPHILS NFR BLD AUTO: 0.6 % (ref 0–2)
BDY SITE: ABNORMAL
BILIRUB SERPL-MCNC: 0.2 MG/DL (ref 0.2–1.8)
BILIRUB UR QL STRIP: NEGATIVE
BNP SERPL-MCNC: 77 PG/ML (ref 0–100)
BODY TEMPERATURE: 98.5 C
BUN BLD-MCNC: 17 MG/DL (ref 7–21)
BUN/CREAT SERPL: 18.5 (ref 7–25)
CALCIUM SPEC-SCNC: 9.9 MG/DL (ref 7.7–10)
CHLORIDE SERPL-SCNC: 111 MMOL/L (ref 99–112)
CLARITY UR: CLEAR
CO2 SERPL-SCNC: 18.3 MMOL/L (ref 24.3–31.9)
COHGB MFR BLD: 1.4 % (ref 0–5)
COLOR UR: YELLOW
CREAT BLD-MCNC: 0.92 MG/DL (ref 0.43–1.29)
CRP SERPL-MCNC: 0.56 MG/DL (ref 0–0.99)
D DIMER PPP FEU-MCNC: <0.27 MCGFEU/ML (ref 0–0.5)
DEPRECATED RDW RBC AUTO: 50.8 FL (ref 37–54)
EOSINOPHIL # BLD AUTO: 0.18 10*3/MM3 (ref 0–0.7)
EOSINOPHIL NFR BLD AUTO: 1.9 % (ref 0–7)
ERYTHROCYTE [DISTWIDTH] IN BLOOD BY AUTOMATED COUNT: 15.7 % (ref 11.5–14.5)
FLUAV AG NPH QL: NEGATIVE
FLUBV AG NPH QL IA: NEGATIVE
GFR SERPL CREATININE-BSD FRML MDRD: 61 ML/MIN/1.73
GLOBULIN UR ELPH-MCNC: 2.8 GM/DL
GLUCOSE BLD-MCNC: 106 MG/DL (ref 70–110)
GLUCOSE UR STRIP-MCNC: NEGATIVE MG/DL
HCO3 BLDA-SCNC: 19.4 MMOL/L (ref 22–26)
HCT VFR BLD AUTO: 38.4 % (ref 37–47)
HCT VFR BLD CALC: 35 % (ref 37–47)
HGB BLD-MCNC: 12.3 G/DL (ref 12–16)
HGB BLDA-MCNC: 11.8 G/DL (ref 12–16)
HGB UR QL STRIP.AUTO: NEGATIVE
HOLD SPECIMEN: NORMAL
HOLD SPECIMEN: NORMAL
HOROWITZ INDEX BLD+IHG-RTO: 28 %
HYALINE CASTS UR QL AUTO: ABNORMAL /LPF
IMM GRANULOCYTES # BLD AUTO: 0.03 10*3/MM3 (ref 0–0.03)
IMM GRANULOCYTES NFR BLD AUTO: 0.3 % (ref 0–0.5)
INR PPP: 1.68 (ref 0.9–1.1)
KETONES UR QL STRIP: NEGATIVE
LEUKOCYTE ESTERASE UR QL STRIP.AUTO: ABNORMAL
LIPASE SERPL-CCNC: 40 U/L (ref 13–60)
LYMPHOCYTES # BLD AUTO: 3.09 10*3/MM3 (ref 1–3)
LYMPHOCYTES NFR BLD AUTO: 32.8 % (ref 16–46)
MAGNESIUM SERPL-MCNC: 1.9 MG/DL (ref 1.7–2.6)
MCH RBC QN AUTO: 28.7 PG (ref 27–33)
MCHC RBC AUTO-ENTMCNC: 32 G/DL (ref 33–37)
MCV RBC AUTO: 89.7 FL (ref 80–94)
METHGB BLD QL: 0.3 % (ref 0–3)
MODALITY: ABNORMAL
MONOCYTES # BLD AUTO: 0.4 10*3/MM3 (ref 0.1–0.9)
MONOCYTES NFR BLD AUTO: 4.3 % (ref 0–12)
NEUTROPHILS # BLD AUTO: 5.65 10*3/MM3 (ref 1.4–6.5)
NEUTROPHILS NFR BLD AUTO: 60.1 % (ref 40–75)
NITRITE UR QL STRIP: NEGATIVE
OSMOLALITY SERPL CALC.SUM OF ELEC: 281.4 MOSM/KG (ref 273–305)
OXYHGB MFR BLDV: 97.2 % (ref 85–100)
PCO2 BLDA: 25.8 MM HG (ref 35–45)
PH BLDA: 7.49 PH UNITS (ref 7.35–7.45)
PH UR STRIP.AUTO: 6 [PH] (ref 5–8)
PLATELET # BLD AUTO: 277 10*3/MM3 (ref 130–400)
PMV BLD AUTO: 10.3 FL (ref 6–10)
PO2 BLDA: 139.3 MM HG (ref 80–100)
POTASSIUM BLD-SCNC: 4.1 MMOL/L (ref 3.5–5.3)
PROT SERPL-MCNC: 7.3 G/DL (ref 6–8)
PROT UR QL STRIP: NEGATIVE
PROTHROMBIN TIME: 20 SECONDS (ref 11–15.4)
RBC # BLD AUTO: 4.28 10*6/MM3 (ref 4.2–5.4)
RBC # UR: ABNORMAL /HPF
REF LAB TEST METHOD: ABNORMAL
SAO2 % BLDCOA: 98.9 % (ref 90–100)
SODIUM BLD-SCNC: 140 MMOL/L (ref 135–153)
SP GR UR STRIP: 1.01 (ref 1–1.03)
SQUAMOUS #/AREA URNS HPF: ABNORMAL /HPF
T4 FREE SERPL-MCNC: 1.1 NG/DL (ref 0.89–1.76)
TROPONIN I SERPL-MCNC: <0.006 NG/ML
TROPONIN I SERPL-MCNC: <0.006 NG/ML
TSH SERPL DL<=0.05 MIU/L-ACNC: 0.79 MIU/ML (ref 0.55–4.78)
UROBILINOGEN UR QL STRIP: ABNORMAL
WBC NRBC COR # BLD: 9.41 10*3/MM3 (ref 4.5–12.5)
WBC UR QL AUTO: ABNORMAL /HPF
WHOLE BLOOD HOLD SPECIMEN: NORMAL
WHOLE BLOOD HOLD SPECIMEN: NORMAL

## 2019-02-28 PROCEDURE — 83880 ASSAY OF NATRIURETIC PEPTIDE: CPT | Performed by: EMERGENCY MEDICINE

## 2019-02-28 PROCEDURE — 84443 ASSAY THYROID STIM HORMONE: CPT | Performed by: EMERGENCY MEDICINE

## 2019-02-28 PROCEDURE — 84439 ASSAY OF FREE THYROXINE: CPT | Performed by: EMERGENCY MEDICINE

## 2019-02-28 PROCEDURE — 99284 EMERGENCY DEPT VISIT MOD MDM: CPT

## 2019-02-28 PROCEDURE — 25010000002 LORAZEPAM PER 2 MG: Performed by: EMERGENCY MEDICINE

## 2019-02-28 PROCEDURE — 93005 ELECTROCARDIOGRAM TRACING: CPT | Performed by: EMERGENCY MEDICINE

## 2019-02-28 PROCEDURE — 25010000002 IOPAMIDOL 61 % SOLUTION: Performed by: EMERGENCY MEDICINE

## 2019-02-28 PROCEDURE — 83735 ASSAY OF MAGNESIUM: CPT | Performed by: EMERGENCY MEDICINE

## 2019-02-28 PROCEDURE — 74177 CT ABD & PELVIS W/CONTRAST: CPT | Performed by: RADIOLOGY

## 2019-02-28 PROCEDURE — 81001 URINALYSIS AUTO W/SCOPE: CPT | Performed by: EMERGENCY MEDICINE

## 2019-02-28 PROCEDURE — 80053 COMPREHEN METABOLIC PANEL: CPT | Performed by: EMERGENCY MEDICINE

## 2019-02-28 PROCEDURE — 36600 WITHDRAWAL OF ARTERIAL BLOOD: CPT | Performed by: EMERGENCY MEDICINE

## 2019-02-28 PROCEDURE — 83050 HGB METHEMOGLOBIN QUAN: CPT | Performed by: EMERGENCY MEDICINE

## 2019-02-28 PROCEDURE — 71046 X-RAY EXAM CHEST 2 VIEWS: CPT | Performed by: RADIOLOGY

## 2019-02-28 PROCEDURE — 71046 X-RAY EXAM CHEST 2 VIEWS: CPT

## 2019-02-28 PROCEDURE — 85730 THROMBOPLASTIN TIME PARTIAL: CPT | Performed by: EMERGENCY MEDICINE

## 2019-02-28 PROCEDURE — 74177 CT ABD & PELVIS W/CONTRAST: CPT

## 2019-02-28 PROCEDURE — 85379 FIBRIN DEGRADATION QUANT: CPT | Performed by: EMERGENCY MEDICINE

## 2019-02-28 PROCEDURE — 82805 BLOOD GASES W/O2 SATURATION: CPT | Performed by: EMERGENCY MEDICINE

## 2019-02-28 PROCEDURE — 70450 CT HEAD/BRAIN W/O DYE: CPT

## 2019-02-28 PROCEDURE — 93010 ELECTROCARDIOGRAM REPORT: CPT | Performed by: INTERNAL MEDICINE

## 2019-02-28 PROCEDURE — 70450 CT HEAD/BRAIN W/O DYE: CPT | Performed by: RADIOLOGY

## 2019-02-28 PROCEDURE — 82375 ASSAY CARBOXYHB QUANT: CPT | Performed by: EMERGENCY MEDICINE

## 2019-02-28 PROCEDURE — 84484 ASSAY OF TROPONIN QUANT: CPT | Performed by: EMERGENCY MEDICINE

## 2019-02-28 PROCEDURE — 96374 THER/PROPH/DIAG INJ IV PUSH: CPT

## 2019-02-28 PROCEDURE — 85610 PROTHROMBIN TIME: CPT | Performed by: EMERGENCY MEDICINE

## 2019-02-28 PROCEDURE — 86140 C-REACTIVE PROTEIN: CPT | Performed by: EMERGENCY MEDICINE

## 2019-02-28 PROCEDURE — 82150 ASSAY OF AMYLASE: CPT | Performed by: EMERGENCY MEDICINE

## 2019-02-28 PROCEDURE — 87804 INFLUENZA ASSAY W/OPTIC: CPT | Performed by: EMERGENCY MEDICINE

## 2019-02-28 PROCEDURE — 85025 COMPLETE CBC W/AUTO DIFF WBC: CPT | Performed by: EMERGENCY MEDICINE

## 2019-02-28 PROCEDURE — 83690 ASSAY OF LIPASE: CPT | Performed by: EMERGENCY MEDICINE

## 2019-02-28 RX ORDER — ASPIRIN 325 MG
325 TABLET ORAL ONCE
Status: COMPLETED | OUTPATIENT
Start: 2019-02-28 | End: 2019-02-28

## 2019-02-28 RX ORDER — LORAZEPAM 2 MG/ML
0.5 INJECTION INTRAMUSCULAR ONCE
Status: COMPLETED | OUTPATIENT
Start: 2019-02-28 | End: 2019-02-28

## 2019-02-28 RX ORDER — LORAZEPAM 0.5 MG/1
0.5 TABLET ORAL EVERY 8 HOURS PRN
Qty: 9 TABLET | Refills: 0 | Status: SHIPPED | OUTPATIENT
Start: 2019-02-28

## 2019-02-28 RX ORDER — SODIUM CHLORIDE 0.9 % (FLUSH) 0.9 %
10 SYRINGE (ML) INJECTION AS NEEDED
Status: DISCONTINUED | OUTPATIENT
Start: 2019-02-28 | End: 2019-02-28 | Stop reason: HOSPADM

## 2019-02-28 RX ADMIN — ASPIRIN 325 MG: 325 TABLET ORAL at 15:50

## 2019-02-28 RX ADMIN — LORAZEPAM 0.5 MG: 2 INJECTION INTRAMUSCULAR; INTRAVENOUS at 16:17

## 2019-02-28 RX ADMIN — IOPAMIDOL 100 ML: 612 INJECTION, SOLUTION INTRAVENOUS at 18:12

## 2019-03-01 NOTE — DISCHARGE INSTRUCTIONS
Home in care of family.  Take your medications as prescribed.  See your doctor upon return home to [place].  Return here or to the nearest emergency department where you may be if your symptoms worsen or if you have any problems.

## 2019-03-20 ENCOUNTER — APPOINTMENT (OUTPATIENT)
Dept: CT IMAGING | Age: 69
End: 2019-03-20
Payer: MEDICARE

## 2019-03-20 ENCOUNTER — HOSPITAL ENCOUNTER (EMERGENCY)
Age: 69
Discharge: HOME | End: 2019-03-20
Attending: EMERGENCY MEDICINE
Payer: MEDICARE

## 2019-03-20 VITALS
BODY MASS INDEX: 32.47 KG/M2 | TEMPERATURE: 97.6 F | HEART RATE: 56 BPM | HEIGHT: 66 IN | OXYGEN SATURATION: 100 % | SYSTOLIC BLOOD PRESSURE: 152 MMHG | WEIGHT: 202 LBS | RESPIRATION RATE: 14 BRPM | DIASTOLIC BLOOD PRESSURE: 70 MMHG

## 2019-03-20 DIAGNOSIS — R53.83 OTHER FATIGUE: Primary | ICD-10-CM

## 2019-03-20 DIAGNOSIS — M54.50 CHRONIC LEFT-SIDED LOW BACK PAIN WITHOUT SCIATICA: ICD-10-CM

## 2019-03-20 DIAGNOSIS — R20.2 TINGLING IN EXTREMITIES: ICD-10-CM

## 2019-03-20 DIAGNOSIS — G89.29 CHRONIC LEFT-SIDED LOW BACK PAIN WITHOUT SCIATICA: ICD-10-CM

## 2019-03-20 LAB
ALBUMIN SERPL-MCNC: 3.6 GM/DL (ref 3.4–5)
ALP BLD-CCNC: 77 IU/L (ref 40–129)
ALT SERPL-CCNC: 14 U/L (ref 10–40)
ANION GAP SERPL CALCULATED.3IONS-SCNC: 12 MMOL/L (ref 4–16)
AST SERPL-CCNC: 16 IU/L (ref 15–37)
BACTERIA: ABNORMAL /HPF
BASOPHILS ABSOLUTE: 0.1 K/CU MM
BASOPHILS RELATIVE PERCENT: 1.2 % (ref 0–1)
BILIRUB SERPL-MCNC: 0.1 MG/DL (ref 0–1)
BILIRUBIN URINE: NEGATIVE MG/DL
BLOOD, URINE: NEGATIVE
BUN BLDV-MCNC: 20 MG/DL (ref 6–23)
CALCIUM SERPL-MCNC: 8.5 MG/DL (ref 8.3–10.6)
CHLORIDE BLD-SCNC: 108 MMOL/L (ref 99–110)
CLARITY: CLEAR
CO2: 18 MMOL/L (ref 21–32)
COLOR: YELLOW
CREAT SERPL-MCNC: 0.9 MG/DL (ref 0.6–1.1)
DIFFERENTIAL TYPE: ABNORMAL
EOSINOPHILS ABSOLUTE: 0.2 K/CU MM
EOSINOPHILS RELATIVE PERCENT: 2.9 % (ref 0–3)
GFR AFRICAN AMERICAN: >60 ML/MIN/1.73M2
GFR NON-AFRICAN AMERICAN: >60 ML/MIN/1.73M2
GLUCOSE BLD-MCNC: 86 MG/DL (ref 70–99)
GLUCOSE, URINE: NEGATIVE MG/DL
HCT VFR BLD CALC: 34.5 % (ref 37–47)
HEMOGLOBIN: 10 GM/DL (ref 12.5–16)
IMMATURE NEUTROPHIL %: 0.6 % (ref 0–0.43)
KETONES, URINE: NEGATIVE MG/DL
LEUKOCYTE ESTERASE, URINE: ABNORMAL
LYMPHOCYTES ABSOLUTE: 2.5 K/CU MM
LYMPHOCYTES RELATIVE PERCENT: 39.2 % (ref 24–44)
MAGNESIUM: 2.1 MG/DL (ref 1.8–2.4)
MCH RBC QN AUTO: 29.2 PG (ref 27–31)
MCHC RBC AUTO-ENTMCNC: 29 % (ref 32–36)
MCV RBC AUTO: 100.9 FL (ref 78–100)
MONOCYTES ABSOLUTE: 0.5 K/CU MM
MONOCYTES RELATIVE PERCENT: 7.9 % (ref 0–4)
MUCUS: ABNORMAL HPF
NITRITE URINE, QUANTITATIVE: NEGATIVE
NUCLEATED RBC %: 0 %
PDW BLD-RTO: 15.2 % (ref 11.7–14.9)
PH, URINE: 5 (ref 5–8)
PHOSPHORUS: 3.7 MG/DL (ref 2.5–4.9)
PLATELET # BLD: 245 K/CU MM (ref 140–440)
PMV BLD AUTO: 10.1 FL (ref 7.5–11.1)
POTASSIUM SERPL-SCNC: 3.9 MMOL/L (ref 3.5–5.1)
PROTEIN UA: NEGATIVE MG/DL
RBC # BLD: 3.42 M/CU MM (ref 4.2–5.4)
RBC URINE: <1 /HPF (ref 0–6)
SEGMENTED NEUTROPHILS ABSOLUTE COUNT: 3.1 K/CU MM
SEGMENTED NEUTROPHILS RELATIVE PERCENT: 48.2 % (ref 36–66)
SODIUM BLD-SCNC: 138 MMOL/L (ref 135–145)
SPECIFIC GRAVITY UA: 1.02 (ref 1–1.03)
SQUAMOUS EPITHELIAL: 4 /HPF
TOTAL CK: 58 IU/L (ref 26–140)
TOTAL IMMATURE NEUTOROPHIL: 0.04 K/CU MM
TOTAL NUCLEATED RBC: 0 K/CU MM
TOTAL PROTEIN: 6.7 GM/DL (ref 6.4–8.2)
TRANSITIONAL EPITHELIAL: <1 /HPF
TRICHOMONAS: ABNORMAL /HPF
TROPONIN T: <0.01 NG/ML
TSH HIGH SENSITIVITY: 2.43 UIU/ML (ref 0.27–4.2)
UROBILINOGEN, URINE: NORMAL MG/DL (ref 0.2–1)
WBC # BLD: 6.5 K/CU MM (ref 4–10.5)
WBC UA: 1 /HPF (ref 0–5)

## 2019-03-20 PROCEDURE — 83735 ASSAY OF MAGNESIUM: CPT

## 2019-03-20 PROCEDURE — 93010 ELECTROCARDIOGRAM REPORT: CPT | Performed by: INTERNAL MEDICINE

## 2019-03-20 PROCEDURE — 84484 ASSAY OF TROPONIN QUANT: CPT

## 2019-03-20 PROCEDURE — 82550 ASSAY OF CK (CPK): CPT

## 2019-03-20 PROCEDURE — 84100 ASSAY OF PHOSPHORUS: CPT

## 2019-03-20 PROCEDURE — 99285 EMERGENCY DEPT VISIT HI MDM: CPT

## 2019-03-20 PROCEDURE — 93005 ELECTROCARDIOGRAM TRACING: CPT | Performed by: EMERGENCY MEDICINE

## 2019-03-20 PROCEDURE — 84443 ASSAY THYROID STIM HORMONE: CPT

## 2019-03-20 PROCEDURE — 80053 COMPREHEN METABOLIC PANEL: CPT

## 2019-03-20 PROCEDURE — 81001 URINALYSIS AUTO W/SCOPE: CPT

## 2019-03-20 PROCEDURE — 2580000003 HC RX 258: Performed by: EMERGENCY MEDICINE

## 2019-03-20 PROCEDURE — 85025 COMPLETE CBC W/AUTO DIFF WBC: CPT

## 2019-03-20 PROCEDURE — 36415 COLL VENOUS BLD VENIPUNCTURE: CPT

## 2019-03-20 PROCEDURE — 74176 CT ABD & PELVIS W/O CONTRAST: CPT

## 2019-03-20 PROCEDURE — 70450 CT HEAD/BRAIN W/O DYE: CPT

## 2019-03-20 RX ORDER — 0.9 % SODIUM CHLORIDE 0.9 %
1000 INTRAVENOUS SOLUTION INTRAVENOUS ONCE
Status: COMPLETED | OUTPATIENT
Start: 2019-03-20 | End: 2019-03-20

## 2019-03-20 RX ADMIN — SODIUM CHLORIDE 1000 ML: 9 INJECTION, SOLUTION INTRAVENOUS at 08:22

## 2019-03-20 ASSESSMENT — PAIN DESCRIPTION - ORIENTATION: ORIENTATION: LEFT

## 2019-03-20 ASSESSMENT — PAIN SCALES - GENERAL: PAINLEVEL_OUTOF10: 3

## 2019-03-20 ASSESSMENT — PAIN DESCRIPTION - LOCATION: LOCATION: CHEST

## 2019-03-20 ASSESSMENT — PAIN DESCRIPTION - DESCRIPTORS: DESCRIPTORS: ACHING;SORE

## 2019-03-20 ASSESSMENT — PAIN DESCRIPTION - PAIN TYPE: TYPE: ACUTE PAIN

## 2019-03-22 LAB
EKG ATRIAL RATE: 51 BPM
EKG DIAGNOSIS: NORMAL
EKG P AXIS: 48 DEGREES
EKG P-R INTERVAL: 152 MS
EKG Q-T INTERVAL: 460 MS
EKG QRS DURATION: 86 MS
EKG QTC CALCULATION (BAZETT): 423 MS
EKG R AXIS: -17 DEGREES
EKG T AXIS: 4 DEGREES
EKG VENTRICULAR RATE: 51 BPM

## 2019-03-29 LAB
BASOPHILS ABSOLUTE: 0.1 /ΜL
BASOPHILS RELATIVE PERCENT: 1.6 %
BUN BLDV-MCNC: 13 MG/DL
CALCIUM SERPL-MCNC: 9.6 MG/DL
CHLORIDE BLD-SCNC: 104 MMOL/L
CO2: 26 MMOL/L
CREAT SERPL-MCNC: 0.7 MG/DL
EOSINOPHILS ABSOLUTE: 0.1 /ΜL
EOSINOPHILS RELATIVE PERCENT: 1.9 %
GFR CALCULATED: NORMAL
GLUCOSE BLD-MCNC: 81 MG/DL
HCT VFR BLD CALC: 32.5 % (ref 36–46)
HEMOGLOBIN: 10.7 G/DL (ref 12–16)
LYMPHOCYTES ABSOLUTE: 1.9 /ΜL
LYMPHOCYTES RELATIVE PERCENT: 25.1 %
MCH RBC QN AUTO: ABNORMAL PG
MCHC RBC AUTO-ENTMCNC: ABNORMAL G/DL
MCV RBC AUTO: ABNORMAL FL
MONOCYTES ABSOLUTE: 0.4 /ΜL
MONOCYTES RELATIVE PERCENT: 4.9 %
NEUTROPHILS ABSOLUTE: 5 /ΜL
NEUTROPHILS RELATIVE PERCENT: 66.5 %
PLATELET # BLD: 231 K/ΜL
PMV BLD AUTO: ABNORMAL FL
POTASSIUM SERPL-SCNC: 3.7 MMOL/L
RBC # BLD: 3.59 10^6/ΜL
SODIUM BLD-SCNC: 142 MMOL/L
WBC # BLD: 7.5 10^3/ML

## 2019-04-01 ENCOUNTER — HOSPITAL ENCOUNTER (OUTPATIENT)
Dept: CARDIAC CATH/INVASIVE PROCEDURES | Age: 69
Setting detail: OUTPATIENT SURGERY
Discharge: HOME | End: 2019-04-01
Attending: INTERNAL MEDICINE | Admitting: INTERNAL MEDICINE
Payer: MEDICARE

## 2019-04-01 VITALS
BODY MASS INDEX: 32.47 KG/M2 | HEART RATE: 60 BPM | TEMPERATURE: 98.3 F | SYSTOLIC BLOOD PRESSURE: 142 MMHG | DIASTOLIC BLOOD PRESSURE: 66 MMHG | HEIGHT: 66 IN | OXYGEN SATURATION: 100 % | RESPIRATION RATE: 18 BRPM | WEIGHT: 202 LBS

## 2019-04-01 LAB
ACTIVATED CLOTTING TIME, LOW RANGE: 196 SEC
ACTIVATED CLOTTING TIME, LOW RANGE: 206 SEC
BUN BLDV-MCNC: 21 MG/DL
CALCIUM SERPL-MCNC: 10 MG/DL
CHLORIDE BLD-SCNC: 104 MMOL/L
CO2: 20 MMOL/L
CREAT SERPL-MCNC: 1.1 MG/DL
GFR CALCULATED: NORMAL
GLUCOSE BLD-MCNC: 97 MG/DL
POTASSIUM SERPL-SCNC: 4.3 MMOL/L
SODIUM BLD-SCNC: 142 MMOL/L

## 2019-04-01 PROCEDURE — C1894 INTRO/SHEATH, NON-LASER: HCPCS

## 2019-04-01 PROCEDURE — 6360000002 HC RX W HCPCS

## 2019-04-01 PROCEDURE — C1769 GUIDE WIRE: HCPCS

## 2019-04-01 PROCEDURE — 93460 R&L HRT ART/VENTRICLE ANGIO: CPT

## 2019-04-01 PROCEDURE — 2709999900 HC NON-CHARGEABLE SUPPLY

## 2019-04-01 PROCEDURE — 93571 IV DOP VEL&/PRESS C FLO 1ST: CPT

## 2019-04-01 PROCEDURE — C1751 CATH, INF, PER/CENT/MIDLINE: HCPCS

## 2019-04-01 PROCEDURE — 6360000004 HC RX CONTRAST MEDICATION

## 2019-04-01 PROCEDURE — C1887 CATHETER, GUIDING: HCPCS

## 2019-04-01 PROCEDURE — 94761 N-INVAS EAR/PLS OXIMETRY MLT: CPT

## 2019-04-01 PROCEDURE — 6370000000 HC RX 637 (ALT 250 FOR IP): Performed by: INTERNAL MEDICINE

## 2019-04-01 PROCEDURE — C1760 CLOSURE DEV, VASC: HCPCS

## 2019-04-01 PROCEDURE — 85347 COAGULATION TIME ACTIVATED: CPT

## 2019-04-01 RX ORDER — LORAZEPAM 0.5 MG/1
0.5 TABLET ORAL 2 TIMES DAILY
COMMUNITY

## 2019-04-01 RX ORDER — FUROSEMIDE 20 MG/1
40 TABLET ORAL DAILY PRN
COMMUNITY

## 2019-04-01 RX ORDER — SODIUM CHLORIDE 9 MG/ML
INJECTION, SOLUTION INTRAVENOUS CONTINUOUS
Status: DISCONTINUED | OUTPATIENT
Start: 2019-04-01 | End: 2019-04-01 | Stop reason: HOSPADM

## 2019-04-01 RX ORDER — SODIUM CHLORIDE 0.9 % (FLUSH) 0.9 %
10 SYRINGE (ML) INJECTION EVERY 12 HOURS SCHEDULED
Status: DISCONTINUED | OUTPATIENT
Start: 2019-04-01 | End: 2019-04-01 | Stop reason: HOSPADM

## 2019-04-01 RX ORDER — VALSARTAN 320 MG/1
320 TABLET ORAL DAILY
COMMUNITY

## 2019-04-01 RX ORDER — MORPHINE SULFATE 2 MG/ML
1 INJECTION, SOLUTION INTRAMUSCULAR; INTRAVENOUS
Status: DISCONTINUED | OUTPATIENT
Start: 2019-04-01 | End: 2019-04-01 | Stop reason: HOSPADM

## 2019-04-01 RX ORDER — ZOLPIDEM TARTRATE 10 MG/1
TABLET ORAL NIGHTLY PRN
COMMUNITY

## 2019-04-01 RX ORDER — DIAZEPAM 5 MG/1
5 TABLET ORAL ONCE
Status: COMPLETED | OUTPATIENT
Start: 2019-04-01 | End: 2019-04-01

## 2019-04-01 RX ORDER — ATROPINE SULFATE 0.4 MG/ML
0.5 AMPUL (ML) INJECTION
Status: DISCONTINUED | OUTPATIENT
Start: 2019-04-01 | End: 2019-04-01 | Stop reason: HOSPADM

## 2019-04-01 RX ORDER — ACETAMINOPHEN 325 MG/1
650 TABLET ORAL EVERY 4 HOURS PRN
Status: DISCONTINUED | OUTPATIENT
Start: 2019-04-01 | End: 2019-04-01 | Stop reason: HOSPADM

## 2019-04-01 RX ORDER — DIPHENHYDRAMINE HCL 25 MG
50 TABLET ORAL ONCE
Status: COMPLETED | OUTPATIENT
Start: 2019-04-01 | End: 2019-04-01

## 2019-04-01 RX ORDER — PANTOPRAZOLE SODIUM 20 MG/1
20 TABLET, DELAYED RELEASE ORAL DAILY
COMMUNITY

## 2019-04-01 RX ORDER — BACLOFEN 20 MG/1
20 TABLET ORAL 3 TIMES DAILY
COMMUNITY

## 2019-04-01 RX ORDER — SODIUM CHLORIDE 0.9 % (FLUSH) 0.9 %
10 SYRINGE (ML) INJECTION PRN
Status: DISCONTINUED | OUTPATIENT
Start: 2019-04-01 | End: 2019-04-01 | Stop reason: HOSPADM

## 2019-04-01 RX ORDER — GABAPENTIN 300 MG/1
300 CAPSULE ORAL 3 TIMES DAILY
COMMUNITY

## 2019-04-01 RX ADMIN — DIAZEPAM 5 MG: 5 TABLET ORAL at 07:48

## 2019-04-01 RX ADMIN — DIPHENHYDRAMINE HCL 50 MG: 25 TABLET ORAL at 07:47

## 2019-04-01 ASSESSMENT — PAIN DESCRIPTION - LOCATION: LOCATION: CHEST

## 2019-04-01 ASSESSMENT — PAIN DESCRIPTION - PAIN TYPE: TYPE: OTHER (COMMENT)

## 2019-04-01 ASSESSMENT — PAIN SCALES - GENERAL: PAINLEVEL_OUTOF10: 4

## 2019-04-01 NOTE — FLOWSHEET NOTE
#7F RFV sheath pulled per Julisa Washingtonrbrittaney.  Manual pressure initiated and held until hemostasis achieved

## 2019-04-01 NOTE — OP NOTE
[facility] --[id number]  LEFT MAIN PATENT  LAD MID 50% INSTENT -FFR 0.9  LCX DOMINANT MILD DX  RCA MID STENT PATENT  LVEDP 13-15  AO SAT 93%  PA SAT-67%  RA-15/13/11  RV-39/3/16  PA=37/17/25  PCWP--20/23/16    MODERATE CAD MEDICAL TREATMENT  MILD PULMONARY HTN  CHECK FOR HOME OXYGEN AND ARTURO  WILL GET SLEEP STUDY AS OUTPATIENT

## 2019-04-01 NOTE — H&P
[address] [place] [address], [address] [place] [address]                              HISTORY AND PHYSICAL    PATIENT NAME: Aleksandar Raymundo                    :        1950  MED REC NO:   [id number]                          ROOM:  ACCOUNT NO:   [de-identified]                           ADMIT DATE: 2019  PROVIDER:     Debi Garcia MD    INDICATION:  Chest pain, dyspnea, coronary artery disease and heart  failure. HISTORY OF PRESENT ILLNESS:  This is a 28-year-old female patient who  has been having significant dyspnea present and chest pain present and  she has been using nitro. Significant dyspnea on exertion also present. She is known to have coronary artery disease, she is status post  angioplasty done. Last heart catheterization was done in . Last echo was done in 2018, LV function preserved, moderate pulmonary  hypertension noted, right ventricular systolic pressure is 57 mmHg  present. Last stress test was done in 2018, negative for ischemia, LV function  was persevered. Holter in 2018 shows sinus rhythm present. PAST MEDICAL HISTORY:  Hypertension, pulmonary hypertension, coronary  artery disease, hyperlipidemia, depression, anxiety and arrhythmias  present. She has a history of paroxysmal atrial fibrillation and now  she is on Coumadin for her CAD. Dr. Avery Mccurdy had placed her on that at  that time. PAST SURGICAL HISTORY:  Appendectomy, gallbladder surgery, fibroid  removed from breast, hysterectomy and tonsillectomy. SOCIAL HISTORY:  Does not smoke, does not drink. ALLERGIES:  TETRACYCLINE, CODEINE and BACTRIM. MEDICATIONS:  She is on propafenone, baclofen, Diovan, Neurontin,  Protonix, Coumadin, Coreg 12.5 b.i.d., Norvasc and clonidine. I will  see if she is on propafenone. PHYSICAL EXAMINATION:  GENERAL:  The patient is awake, alert, answering questions, not in acute  distress.   VITAL SIGNS: Temperature is afebrile, pulse is 56, blood pressure  140/65. HEENT:  Head is normocephalic, atraumatic. Pupils are equal and  reactive to light. CHEST:  Equal expansion. LUNGS:  Clear to auscultation. No wheezing or rhonchi. HEART:  Regular rhythm. ABDOMEN:  Soft, nontender. Bowel sounds are present. No  hepatosplenomegaly or guarding appreciated. EXTREMITIES:  No cyanosis or clubbing noted. NEUROLOGIC:  Cranial nerves II through XII are grossly intact. LABORATORY DATA:  Her BUN is 20, creatinine 0.9. Troponins are  negative. LFTs are normal.  CBC is within normal range. IMPRESSION AND PLAN:  This is a 70-year-old female patient who has  multiple hospitalizations with having chest pain and she is now here for  heart catheterization today. We will make further recommendations based  on heart cath. The patient has positive sleep apnea present. We will evaluate for that  also. Further recommendations based on the hospital course.         Dae Mcbride MD    D: 04/01/2019 7:34:22       T: 04/01/2019 11:46:25     [email or web address]  Job#: [id number]     Doc#: [id number]    CC:

## 2019-04-01 NOTE — PROCEDURES
[address] [place] [address], [address] [place] [address]                            CARDIAC CATHETERIZATION    PATIENT NAME: Jaleesa Hoffmann                    :        1950  MED REC NO:   [id number]                          ROOM:  ACCOUNT NO:   [de-identified]                           ADMIT DATE: 2019  PROVIDER:     Silvia Wellington MD    DATE OF PROCEDURE:  2019    INDICATION:  Chest pain, heart failure and dyspnea. This is a 68-year-old female patient brought to cath lab today. Informed consent was obtained from the patient. The patient was prepped  and draped in usual sterile fashion. The patient was injected with 20  mL of 2% lidocaine in the right femoral artery region. Using a  micropuncture needle, the right femoral artery was cannulized and a  4-Tanzanian right arterial sheath was placed and a 7-Tanzanian venous sheath  was placed. Using AR Mod catheter, right coronary artery angiogram was performed. Right coronary artery angiogram revealed the right coronary artery was  engaged. Right coronary artery showed the right coronary artery has a  mid stent present. Proximal mid stent is widely patent and PRETTY-3 flow  is noted. It is a co-dominant system, gives off the PDA branch. Then, using a JL4 4-Tanzanian catheter, left coronary artery angiogram was  performed. Left coronary artery angiogram revealed the left main is  patent, bifurcates in the LAD and circumflex artery. Circ is a large-sized vessel, dominant vessel. Circ has mild disease  noted, OM1 has mild disease, OM2 has mild disease, PDA and the PLV  branches have mild disease noted. LAD, there is a stent present in the proximal mid segment. Has a 50%  in-stent stenosis noted in the proximal segment. Rest of the LAD  reaches and wraps the apex and gives off small diagonal branches 1 and  2. There is mild disease present.     Using a pigtail catheter, EDP was measured. EDP was around 15 mmHg  present. On the pullback, there was no gradient present across the  aortic valve. Aortic sat is 92% on room air, PA sat is 67% on room air. Right heart catheterization was performed. LVEDP is between 13 and 15  mmHg present and RA pressure is 15/13 with a mean of 11, RV is 39/3 with  a mean of 16, PA is 37/17 with a mean of 25 and pulmonary capillary  wedge pressure 20/23 with a mean of 16 present. The patient's 4-Yi sheath was exchanged for a 6-Yi sheath. Then, using a VL3 guide, FFR of the LAD was performed using IV  adenosine. The fidencio was 0.9. IMPRESSION:  1. Left main is short and is patent. 2.  LAD proximal to mid has 50% in-stent stenosis present and FFR is  0.9.  3.  Circ is a co-dominant system and it has mild disease noted. 4.  RCA is a co-dominant system and there is a stent present, which is  also patent. 5.  EDP is around 13 to 15 mmHg present. 6.  Right heart pressure is slightly elevated. Mean wedge pressure  around 16 present. PA sat and aortic sat are stable. 7.  The patient has moderate coronary artery disease of the LAD present  with patent stents, medical treatment for that. 8.  Evaluated for her lung issues. Probably, she uses oxygen and  possibly, she has sleep apnea and she is evaluated for that. The patient tolerated the procedure well, no complications noted. An  Angio-Seal was placed on the right femoral artery and venous sheath will  be removed.         Sandie Henriquez MD    D: 04/01/2019 10:08:34       T: 04/01/2019 12:25:24     [email or web address]  Job#: [id number]     Doc#: [id number]    CC:

## 2019-04-01 NOTE — FLOWSHEET NOTE
Pt to pt  in wheelchair per volunteer for d/c home in private vehicle with spouse.  Right groin free of bleeding/hematoma at time of d/c

## 2019-04-03 ENCOUNTER — HOSPITAL ENCOUNTER (EMERGENCY)
Age: 69
Discharge: HOME | End: 2019-04-03
Attending: EMERGENCY MEDICINE
Payer: OTHER MISCELLANEOUS

## 2019-04-03 ENCOUNTER — APPOINTMENT (OUTPATIENT)
Dept: CT IMAGING | Age: 69
End: 2019-04-03
Payer: OTHER MISCELLANEOUS

## 2019-04-03 ENCOUNTER — APPOINTMENT (OUTPATIENT)
Dept: GENERAL RADIOLOGY | Age: 69
End: 2019-04-03
Payer: OTHER MISCELLANEOUS

## 2019-04-03 VITALS
SYSTOLIC BLOOD PRESSURE: 149 MMHG | OXYGEN SATURATION: 100 % | HEIGHT: 66 IN | BODY MASS INDEX: 32.47 KG/M2 | DIASTOLIC BLOOD PRESSURE: 67 MMHG | WEIGHT: 202 LBS | TEMPERATURE: 98 F | RESPIRATION RATE: 13 BRPM | HEART RATE: 60 BPM

## 2019-04-03 DIAGNOSIS — R07.89 CHEST WALL PAIN: ICD-10-CM

## 2019-04-03 DIAGNOSIS — S09.90XA INJURY OF HEAD, INITIAL ENCOUNTER: ICD-10-CM
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
## 2019-04-03 DIAGNOSIS — S16.1XXA STRAIN OF NECK MUSCLE, INITIAL ENCOUNTER: ICD-10-CM

## 2019-04-03 DIAGNOSIS — S69.91XA HAND INJURY, RIGHT, INITIAL ENCOUNTER: ICD-10-CM

## 2019-04-03 LAB
ALBUMIN SERPL-MCNC: 4.5 GM/DL (ref 3.4–5)
ALP BLD-CCNC: 102 IU/L (ref 40–129)
ALT SERPL-CCNC: 21 U/L (ref 10–40)
ANION GAP SERPL CALCULATED.3IONS-SCNC: 15 MMOL/L (ref 4–16)
AST SERPL-CCNC: 33 IU/L (ref 15–37)
BASOPHILS ABSOLUTE: 0.1 K/CU MM
BASOPHILS RELATIVE PERCENT: 0.6 % (ref 0–1)
BILIRUB SERPL-MCNC: 0.3 MG/DL (ref 0–1)
BUN BLDV-MCNC: 13 MG/DL (ref 6–23)
CALCIUM SERPL-MCNC: 9.5 MG/DL (ref 8.3–10.6)
CHLORIDE BLD-SCNC: 98 MMOL/L (ref 99–110)
CO2: 26 MMOL/L (ref 21–32)
CREAT SERPL-MCNC: 1 MG/DL (ref 0.6–1.1)
DIFFERENTIAL TYPE: ABNORMAL
EOSINOPHILS ABSOLUTE: 0.2 K/CU MM
EOSINOPHILS RELATIVE PERCENT: 2 % (ref 0–3)
GFR AFRICAN AMERICAN: >60 ML/MIN/1.73M2
GFR NON-AFRICAN AMERICAN: 55 ML/MIN/1.73M2
GLUCOSE BLD-MCNC: 105 MG/DL (ref 70–99)
HCT VFR BLD CALC: 38.4 % (ref 37–47)
HEMOGLOBIN: 11.7 GM/DL (ref 12.5–16)
IMMATURE NEUTROPHIL %: 1.2 % (ref 0–0.43)
INR BLD: 1.13 INDEX
LYMPHOCYTES ABSOLUTE: 2.9 K/CU MM
LYMPHOCYTES RELATIVE PERCENT: 29 % (ref 24–44)
MCH RBC QN AUTO: 28.5 PG (ref 27–31)
MCHC RBC AUTO-ENTMCNC: 30.5 % (ref 32–36)
MCV RBC AUTO: 93.4 FL (ref 78–100)
MONOCYTES ABSOLUTE: 0.5 K/CU MM
MONOCYTES RELATIVE PERCENT: 5.3 % (ref 0–4)
NUCLEATED RBC %: 0 %
PDW BLD-RTO: 14.4 % (ref 11.7–14.9)
PLATELET # BLD: 236 K/CU MM (ref 140–440)
PMV BLD AUTO: 10.4 FL (ref 7.5–11.1)
POTASSIUM SERPL-SCNC: 3.6 MMOL/L (ref 3.5–5.1)
PROTHROMBIN TIME: 13.1 SECONDS (ref 9.12–12.5)
RBC # BLD: 4.11 M/CU MM (ref 4.2–5.4)
SEGMENTED NEUTROPHILS ABSOLUTE COUNT: 6.3 K/CU MM
SEGMENTED NEUTROPHILS RELATIVE PERCENT: 61.9 % (ref 36–66)
SODIUM BLD-SCNC: 139 MMOL/L (ref 135–145)
TOTAL IMMATURE NEUTOROPHIL: 0.12 K/CU MM
TOTAL NUCLEATED RBC: 0 K/CU MM
TOTAL PROTEIN: 8.1 GM/DL (ref 6.4–8.2)
TROPONIN T: <0.01 NG/ML
TROPONIN T: <0.01 NG/ML
WBC # BLD: 10.1 K/CU MM (ref 4–10.5)

## 2019-04-03 PROCEDURE — 85025 COMPLETE CBC W/AUTO DIFF WBC: CPT

## 2019-04-03 PROCEDURE — 80053 COMPREHEN METABOLIC PANEL: CPT

## 2019-04-03 PROCEDURE — 93010 ELECTROCARDIOGRAM REPORT: CPT | Performed by: INTERNAL MEDICINE

## 2019-04-03 PROCEDURE — 71250 CT THORAX DX C-: CPT

## 2019-04-03 PROCEDURE — 96376 TX/PRO/DX INJ SAME DRUG ADON: CPT

## 2019-04-03 PROCEDURE — 72125 CT NECK SPINE W/O DYE: CPT

## 2019-04-03 PROCEDURE — 36415 COLL VENOUS BLD VENIPUNCTURE: CPT

## 2019-04-03 PROCEDURE — 70450 CT HEAD/BRAIN W/O DYE: CPT

## 2019-04-03 PROCEDURE — 72128 CT CHEST SPINE W/O DYE: CPT

## 2019-04-03 PROCEDURE — 73130 X-RAY EXAM OF HAND: CPT

## 2019-04-03 PROCEDURE — 85610 PROTHROMBIN TIME: CPT

## 2019-04-03 PROCEDURE — 6360000002 HC RX W HCPCS: Performed by: PHYSICIAN ASSISTANT

## 2019-04-03 PROCEDURE — 99285 EMERGENCY DEPT VISIT HI MDM: CPT

## 2019-04-03 PROCEDURE — 96374 THER/PROPH/DIAG INJ IV PUSH: CPT

## 2019-04-03 PROCEDURE — 93005 ELECTROCARDIOGRAM TRACING: CPT | Performed by: PHYSICIAN ASSISTANT

## 2019-04-03 PROCEDURE — 72131 CT LUMBAR SPINE W/O DYE: CPT

## 2019-04-03 PROCEDURE — 84484 ASSAY OF TROPONIN QUANT: CPT

## 2019-04-03 RX ORDER — METHOCARBAMOL 500 MG/1
500 TABLET, FILM COATED ORAL 4 TIMES DAILY
Qty: 20 TABLET | Refills: 0 | Status: SHIPPED | OUTPATIENT
Start: 2019-04-03

## 2019-04-03 RX ORDER — IBUPROFEN 600 MG/1
600 TABLET ORAL EVERY 6 HOURS PRN
Qty: 30 TABLET | Refills: 0 | Status: SHIPPED | OUTPATIENT
Start: 2019-04-03

## 2019-04-03 RX ORDER — MORPHINE SULFATE 4 MG/ML
4 INJECTION, SOLUTION INTRAMUSCULAR; INTRAVENOUS ONCE
Status: COMPLETED | OUTPATIENT
Start: 2019-04-03 | End: 2019-04-03

## 2019-04-03 RX ADMIN — MORPHINE SULFATE 4 MG: 4 INJECTION INTRAVENOUS at 12:38

## 2019-04-03 RX ADMIN — MORPHINE SULFATE 4 MG: 4 INJECTION INTRAVENOUS at 13:50

## 2019-04-03 ASSESSMENT — PAIN DESCRIPTION - DESCRIPTORS: DESCRIPTORS: STABBING;DISCOMFORT

## 2019-04-03 ASSESSMENT — PAIN SCALES - GENERAL
PAINLEVEL_OUTOF10: 9
PAINLEVEL_OUTOF10: 8
PAINLEVEL_OUTOF10: 8

## 2019-04-03 ASSESSMENT — PAIN DESCRIPTION - LOCATION: LOCATION: CHEST

## 2019-04-03 ASSESSMENT — PAIN DESCRIPTION - PROGRESSION: CLINICAL_PROGRESSION: NOT CHANGED

## 2019-04-03 ASSESSMENT — PAIN DESCRIPTION - ORIENTATION: ORIENTATION: MID

## 2019-04-03 ASSESSMENT — PAIN DESCRIPTION - ONSET: ONSET: ON-GOING

## 2019-04-03 ASSESSMENT — PAIN DESCRIPTION - PAIN TYPE: TYPE: ACUTE PAIN

## 2019-04-03 NOTE — ED NOTES
Bedside report given to Clau Lanier. HÉCTOR.       Henry Deleon, HÉCTOR  04/03/19 Garth Peters, HÉCTOR  04/03/19 [id number]

## 2019-04-03 NOTE — ED NOTES
Bed: ED-27  Expected date:   Expected time:   Means of arrival:   Comments:  bryon Sharpe  04/03/19 1121
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
## 2019-04-08 LAB
EKG ATRIAL RATE: 75 BPM
EKG DIAGNOSIS: NORMAL
EKG P AXIS: 26 DEGREES
EKG P-R INTERVAL: 148 MS
EKG Q-T INTERVAL: 412 MS
EKG QRS DURATION: 82 MS
EKG QTC CALCULATION (BAZETT): 460 MS
EKG R AXIS: -19 DEGREES
EKG T AXIS: -4 DEGREES
EKG VENTRICULAR RATE: 75 BPM

## 2019-04-09 ENCOUNTER — APPOINTMENT (OUTPATIENT)
Dept: GENERAL RADIOLOGY | Age: 69
End: 2019-04-09
Payer: OTHER MISCELLANEOUS

## 2019-04-09 ENCOUNTER — HOSPITAL ENCOUNTER (EMERGENCY)
Age: 69
Discharge: HOME | End: 2019-04-09
Attending: EMERGENCY MEDICINE
Payer: OTHER MISCELLANEOUS

## 2019-04-09 VITALS
SYSTOLIC BLOOD PRESSURE: 153 MMHG | OXYGEN SATURATION: 100 % | TEMPERATURE: 97.5 F | HEART RATE: 53 BPM | RESPIRATION RATE: 22 BRPM | DIASTOLIC BLOOD PRESSURE: 76 MMHG | BODY MASS INDEX: 32.47 KG/M2 | WEIGHT: 202 LBS | HEIGHT: 66 IN

## 2019-04-09 DIAGNOSIS — M79.10 MUSCLE ACHE: ICD-10-CM

## 2019-04-09 DIAGNOSIS — R07.89 CHEST WALL PAIN: Primary | ICD-10-CM

## 2019-04-09 DIAGNOSIS — F41.1 ANXIETY STATE: ICD-10-CM

## 2019-04-09 LAB
ALBUMIN SERPL-MCNC: 4.8 GM/DL (ref 3.4–5)
ALP BLD-CCNC: 103 IU/L (ref 40–129)
ALT SERPL-CCNC: 21 U/L (ref 10–40)
ANION GAP SERPL CALCULATED.3IONS-SCNC: 15 MMOL/L (ref 4–16)
AST SERPL-CCNC: 25 IU/L (ref 15–37)
BASOPHILS ABSOLUTE: 0.1 K/CU MM
BASOPHILS RELATIVE PERCENT: 0.6 % (ref 0–1)
BILIRUB SERPL-MCNC: 0.3 MG/DL (ref 0–1)
BUN BLDV-MCNC: 24 MG/DL (ref 6–23)
CALCIUM SERPL-MCNC: 9.6 MG/DL (ref 8.3–10.6)
CHLORIDE BLD-SCNC: 102 MMOL/L (ref 99–110)
CO2: 24 MMOL/L (ref 21–32)
CREAT SERPL-MCNC: 1 MG/DL (ref 0.6–1.1)
DIFFERENTIAL TYPE: ABNORMAL
EOSINOPHILS ABSOLUTE: 0.1 K/CU MM
EOSINOPHILS RELATIVE PERCENT: 1.1 % (ref 0–3)
GFR AFRICAN AMERICAN: >60 ML/MIN/1.73M2
GFR NON-AFRICAN AMERICAN: 55 ML/MIN/1.73M2
GLUCOSE BLD-MCNC: 111 MG/DL (ref 70–99)
HCT VFR BLD CALC: 39.1 % (ref 37–47)
HEMOGLOBIN: 12.4 GM/DL (ref 12.5–16)
IMMATURE NEUTROPHIL %: 1.1 % (ref 0–0.43)
LYMPHOCYTES ABSOLUTE: 4.7 K/CU MM
LYMPHOCYTES RELATIVE PERCENT: 40.9 % (ref 24–44)
MAGNESIUM: 1.9 MG/DL (ref 1.8–2.4)
MCH RBC QN AUTO: 29.4 PG (ref 27–31)
MCHC RBC AUTO-ENTMCNC: 31.7 % (ref 32–36)
MCV RBC AUTO: 92.7 FL (ref 78–100)
MONOCYTES ABSOLUTE: 0.7 K/CU MM
MONOCYTES RELATIVE PERCENT: 6.3 % (ref 0–4)
NUCLEATED RBC %: 0 %
PDW BLD-RTO: 15.1 % (ref 11.7–14.9)
PLATELET # BLD: 356 K/CU MM (ref 140–440)
PMV BLD AUTO: 10.1 FL (ref 7.5–11.1)
POTASSIUM SERPL-SCNC: 3.5 MMOL/L (ref 3.5–5.1)
PRO-BNP: 199 PG/ML
RBC # BLD: 4.22 M/CU MM (ref 4.2–5.4)
SEGMENTED NEUTROPHILS ABSOLUTE COUNT: 5.7 K/CU MM
SEGMENTED NEUTROPHILS RELATIVE PERCENT: 50 % (ref 36–66)
SODIUM BLD-SCNC: 141 MMOL/L (ref 135–145)
TOTAL IMMATURE NEUTOROPHIL: 0.13 K/CU MM
TOTAL NUCLEATED RBC: 0 K/CU MM
TOTAL PROTEIN: 8.5 GM/DL (ref 6.4–8.2)
TROPONIN T: <0.01 NG/ML
TSH HIGH SENSITIVITY: 3.59 UIU/ML (ref 0.27–4.2)
WBC # BLD: 11.4 K/CU MM (ref 4–10.5)

## 2019-04-09 PROCEDURE — 71045 X-RAY EXAM CHEST 1 VIEW: CPT

## 2019-04-09 PROCEDURE — 6360000002 HC RX W HCPCS: Performed by: EMERGENCY MEDICINE

## 2019-04-09 PROCEDURE — 84443 ASSAY THYROID STIM HORMONE: CPT

## 2019-04-09 PROCEDURE — 80053 COMPREHEN METABOLIC PANEL: CPT

## 2019-04-09 PROCEDURE — 96374 THER/PROPH/DIAG INJ IV PUSH: CPT

## 2019-04-09 PROCEDURE — 93005 ELECTROCARDIOGRAM TRACING: CPT | Performed by: EMERGENCY MEDICINE

## 2019-04-09 PROCEDURE — 6370000000 HC RX 637 (ALT 250 FOR IP): Performed by: EMERGENCY MEDICINE

## 2019-04-09 PROCEDURE — 85025 COMPLETE CBC W/AUTO DIFF WBC: CPT

## 2019-04-09 PROCEDURE — 83880 ASSAY OF NATRIURETIC PEPTIDE: CPT

## 2019-04-09 PROCEDURE — 99285 EMERGENCY DEPT VISIT HI MDM: CPT

## 2019-04-09 PROCEDURE — 83735 ASSAY OF MAGNESIUM: CPT

## 2019-04-09 PROCEDURE — 93010 ELECTROCARDIOGRAM REPORT: CPT | Performed by: INTERNAL MEDICINE

## 2019-04-09 PROCEDURE — 84484 ASSAY OF TROPONIN QUANT: CPT

## 2019-04-09 RX ORDER — LORAZEPAM 0.5 MG/1
0.5 TABLET ORAL ONCE
Status: COMPLETED | OUTPATIENT
Start: 2019-04-09 | End: 2019-04-09

## 2019-04-09 RX ORDER — KETOROLAC TROMETHAMINE 30 MG/ML
15 INJECTION, SOLUTION INTRAMUSCULAR; INTRAVENOUS ONCE
Status: COMPLETED | OUTPATIENT
Start: 2019-04-09 | End: 2019-04-09

## 2019-04-09 RX ORDER — LIDOCAINE 4 G/G
1 PATCH TOPICAL ONCE
Status: DISCONTINUED | OUTPATIENT
Start: 2019-04-09 | End: 2019-04-09 | Stop reason: HOSPADM

## 2019-04-09 RX ADMIN — LORAZEPAM 0.5 MG: 0.5 TABLET ORAL at 12:28

## 2019-04-09 RX ADMIN — KETOROLAC TROMETHAMINE 15 MG: 30 INJECTION, SOLUTION INTRAMUSCULAR at 12:52

## 2019-04-09 ASSESSMENT — ENCOUNTER SYMPTOMS
EYE REDNESS: 0
RHINORRHEA: 0
SORE THROAT: 0
BACK PAIN: 0
COUGH: 0
VOMITING: 0
WHEEZING: 0
NAUSEA: 0
ABDOMINAL PAIN: 0
SHORTNESS OF BREATH: 1

## 2019-04-09 ASSESSMENT — PAIN SCALES - GENERAL: PAINLEVEL_OUTOF10: 8

## 2019-04-09 ASSESSMENT — PAIN DESCRIPTION - PAIN TYPE: TYPE: ACUTE PAIN

## 2019-04-09 NOTE — ED NOTES
Updated on plan of care. Waiting lab results. Ice water provided. Friend at bedside. Will continue to monitor.      Claudia Hubbard RN  04/09/19 [id number]
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
## 2019-04-16 LAB
EKG ATRIAL RATE: 57 BPM
EKG DIAGNOSIS: NORMAL
EKG P AXIS: 55 DEGREES
EKG P-R INTERVAL: 144 MS
EKG Q-T INTERVAL: 452 MS
EKG QRS DURATION: 86 MS
EKG QTC CALCULATION (BAZETT): 439 MS
EKG R AXIS: -2 DEGREES
EKG T AXIS: 20 DEGREES
EKG VENTRICULAR RATE: 57 BPM

## 2019-04-20 ENCOUNTER — APPOINTMENT (OUTPATIENT)
Dept: CT IMAGING | Age: 69
DRG: 385 | End: 2019-04-20
Payer: MEDICARE

## 2019-04-20 ENCOUNTER — APPOINTMENT (OUTPATIENT)
Dept: GENERAL RADIOLOGY | Age: 69
DRG: 385 | End: 2019-04-20
Payer: MEDICARE

## 2019-04-20 ENCOUNTER — HOSPITAL ENCOUNTER (INPATIENT)
Age: 69
LOS: 1 days | Discharge: HOME | DRG: 385 | End: 2019-04-25
Attending: EMERGENCY MEDICINE | Admitting: HOSPITALIST
Payer: MEDICARE

## 2019-04-20 DIAGNOSIS — R10.13 ABDOMINAL PAIN, EPIGASTRIC: Primary | ICD-10-CM

## 2019-04-20 DIAGNOSIS — R11.0 NAUSEA: ICD-10-CM

## 2019-04-20 DIAGNOSIS — D72.829 LEUKOCYTOSIS, UNSPECIFIED TYPE: ICD-10-CM

## 2019-04-20 DIAGNOSIS — R07.9 CHEST PAIN, UNSPECIFIED TYPE: ICD-10-CM

## 2019-04-20 LAB
ALBUMIN SERPL-MCNC: 4.4 GM/DL (ref 3.4–5)
ALP BLD-CCNC: 136 IU/L (ref 40–129)
ALT SERPL-CCNC: 15 U/L (ref 10–40)
ANION GAP SERPL CALCULATED.3IONS-SCNC: 18 MMOL/L (ref 4–16)
AST SERPL-CCNC: 18 IU/L (ref 15–37)
ATYPICAL LYMPHOCYTE ABSOLUTE COUNT: ABNORMAL
BANDED NEUTROPHILS ABSOLUTE COUNT: 0.39 K/CU MM
BANDED NEUTROPHILS RELATIVE PERCENT: 2 % (ref 5–11)
BASOPHILS ABSOLUTE: 0.2 K/CU MM
BASOPHILS RELATIVE PERCENT: 1 % (ref 0–1)
BILIRUB SERPL-MCNC: 0.3 MG/DL (ref 0–1)
BUN BLDV-MCNC: 15 MG/DL (ref 6–23)
CALCIUM SERPL-MCNC: 10.1 MG/DL (ref 8.3–10.6)
CHLORIDE BLD-SCNC: 98 MMOL/L (ref 99–110)
CO2: 24 MMOL/L (ref 21–32)
CREAT SERPL-MCNC: 0.9 MG/DL (ref 0.6–1.1)
DIFFERENTIAL TYPE: ABNORMAL
EOSINOPHILS ABSOLUTE: 0.4 K/CU MM
EOSINOPHILS RELATIVE PERCENT: 2 % (ref 0–3)
GFR AFRICAN AMERICAN: >60 ML/MIN/1.73M2
GFR NON-AFRICAN AMERICAN: >60 ML/MIN/1.73M2
GLUCOSE BLD-MCNC: 134 MG/DL (ref 70–99)
HCG QUALITATIVE: NEGATIVE
HCT VFR BLD CALC: 38.9 % (ref 37–47)
HEMOGLOBIN: 12 GM/DL (ref 12.5–16)
LIPASE: 31 IU/L (ref 13–60)
LYMPHOCYTES ABSOLUTE: 3.1 K/CU MM
LYMPHOCYTES RELATIVE PERCENT: 16 % (ref 24–44)
MCH RBC QN AUTO: 28.5 PG (ref 27–31)
MCHC RBC AUTO-ENTMCNC: 30.8 % (ref 32–36)
MCV RBC AUTO: 92.4 FL (ref 78–100)
MONOCYTES ABSOLUTE: 0.2 K/CU MM
MONOCYTES RELATIVE PERCENT: 1 % (ref 0–4)
PDW BLD-RTO: 14.8 % (ref 11.7–14.9)
PLATELET # BLD: 352 K/CU MM (ref 140–440)
PLT MORPHOLOGY: ABNORMAL
PMV BLD AUTO: 10.4 FL (ref 7.5–11.1)
POLYCHROMASIA: ABNORMAL
POTASSIUM SERPL-SCNC: 4.3 MMOL/L (ref 3.5–5.1)
PRO-BNP: 207.1 PG/ML
RBC # BLD: 4.21 M/CU MM (ref 4.2–5.4)
SEGMENTED NEUTROPHILS ABSOLUTE COUNT: 15.1 K/CU MM
SEGMENTED NEUTROPHILS RELATIVE PERCENT: 78 % (ref 36–66)
SMUDGE CELLS: PRESENT
SODIUM BLD-SCNC: 140 MMOL/L (ref 135–145)
STOMATOCYTES: ABNORMAL
TOTAL PROTEIN: 7.6 GM/DL (ref 6.4–8.2)
TROPONIN T: <0.01 NG/ML
TSH HIGH SENSITIVITY: 0.65 UIU/ML (ref 0.27–4.2)
WBC # BLD: 19.4 K/CU MM (ref 4–10.5)
WBC # BLD: ABNORMAL 10*3/UL

## 2019-04-20 PROCEDURE — 96375 TX/PRO/DX INJ NEW DRUG ADDON: CPT

## 2019-04-20 PROCEDURE — 84443 ASSAY THYROID STIM HORMONE: CPT

## 2019-04-20 PROCEDURE — 83880 ASSAY OF NATRIURETIC PEPTIDE: CPT

## 2019-04-20 PROCEDURE — 99285 EMERGENCY DEPT VISIT HI MDM: CPT

## 2019-04-20 PROCEDURE — 85027 COMPLETE CBC AUTOMATED: CPT

## 2019-04-20 PROCEDURE — 96374 THER/PROPH/DIAG INJ IV PUSH: CPT

## 2019-04-20 PROCEDURE — 74177 CT ABD & PELVIS W/CONTRAST: CPT

## 2019-04-20 PROCEDURE — 93005 ELECTROCARDIOGRAM TRACING: CPT | Performed by: PHYSICIAN ASSISTANT

## 2019-04-20 PROCEDURE — 85007 BL SMEAR W/DIFF WBC COUNT: CPT

## 2019-04-20 PROCEDURE — 81001 URINALYSIS AUTO W/SCOPE: CPT

## 2019-04-20 PROCEDURE — 93226 XTRNL ECG REC<48 HR SCAN A/R: CPT

## 2019-04-20 PROCEDURE — 6360000002 HC RX W HCPCS: Performed by: PHYSICIAN ASSISTANT

## 2019-04-20 PROCEDURE — 84436 ASSAY OF TOTAL THYROXINE: CPT

## 2019-04-20 PROCEDURE — 83690 ASSAY OF LIPASE: CPT

## 2019-04-20 PROCEDURE — 6360000004 HC RX CONTRAST MEDICATION: Performed by: PHYSICIAN ASSISTANT

## 2019-04-20 PROCEDURE — 84484 ASSAY OF TROPONIN QUANT: CPT

## 2019-04-20 PROCEDURE — 80053 COMPREHEN METABOLIC PANEL: CPT

## 2019-04-20 PROCEDURE — 71046 X-RAY EXAM CHEST 2 VIEWS: CPT

## 2019-04-20 PROCEDURE — 84703 CHORIONIC GONADOTROPIN ASSAY: CPT

## 2019-04-20 PROCEDURE — 2580000003 HC RX 258: Performed by: PHYSICIAN ASSISTANT

## 2019-04-20 RX ORDER — ONDANSETRON 2 MG/ML
4 INJECTION INTRAMUSCULAR; INTRAVENOUS EVERY 30 MIN PRN
Status: DISCONTINUED | OUTPATIENT
Start: 2019-04-20 | End: 2019-04-21

## 2019-04-20 RX ORDER — MORPHINE SULFATE 4 MG/ML
4 INJECTION, SOLUTION INTRAMUSCULAR; INTRAVENOUS EVERY 30 MIN PRN
Status: DISCONTINUED | OUTPATIENT
Start: 2019-04-20 | End: 2019-04-21

## 2019-04-20 RX ORDER — SODIUM CHLORIDE 0.9 % (FLUSH) 0.9 %
10 SYRINGE (ML) INJECTION 2 TIMES DAILY
Status: DISCONTINUED | OUTPATIENT
Start: 2019-04-20 | End: 2019-04-21

## 2019-04-20 RX ADMIN — MORPHINE SULFATE 4 MG: 4 INJECTION INTRAVENOUS at 19:41

## 2019-04-20 RX ADMIN — Medication 10 ML: at 21:05

## 2019-04-20 RX ADMIN — IOPAMIDOL 80 ML: 755 INJECTION, SOLUTION INTRAVENOUS at 21:05

## 2019-04-20 ASSESSMENT — PAIN DESCRIPTION - PAIN TYPE: TYPE: ACUTE PAIN

## 2019-04-20 ASSESSMENT — PAIN SCALES - GENERAL
PAINLEVEL_OUTOF10: 8
PAINLEVEL_OUTOF10: 8
PAINLEVEL_OUTOF10: 7

## 2019-04-20 ASSESSMENT — PAIN DESCRIPTION - DESCRIPTORS: DESCRIPTORS: CRAMPING

## 2019-04-20 NOTE — ED NOTES
Pt had C-collar in place upon arrival to ER was placed by EMS     Annalise Durán, [address] [place] [address]  04/20/19 1931
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
## 2019-04-20 NOTE — ED NOTES
Bed: ED-32  Expected date:   Expected time:   Means of arrival:   Comments:  [facility] [address] [place] [address] Drive, [address] [place] [address]  04/20/19 [id number]

## 2019-04-20 NOTE — ED NOTES
Pt taken to xray then going to go to CT scan if ready for pt     Annalise Richards, RN  04/20/19 3636

## 2019-04-21 LAB
BACTERIA: NEGATIVE /HPF
BILIRUBIN URINE: NEGATIVE MG/DL
BLOOD, URINE: NEGATIVE
CLARITY: CLEAR
COLOR: ABNORMAL
EKG ATRIAL RATE: 71 BPM
EKG ATRIAL RATE: 96 BPM
EKG DIAGNOSIS: NORMAL
EKG DIAGNOSIS: NORMAL
EKG P AXIS: 46 DEGREES
EKG P AXIS: 60 DEGREES
EKG P-R INTERVAL: 130 MS
EKG P-R INTERVAL: 136 MS
EKG Q-T INTERVAL: 394 MS
EKG Q-T INTERVAL: 430 MS
EKG QRS DURATION: 74 MS
EKG QRS DURATION: 88 MS
EKG QTC CALCULATION (BAZETT): 467 MS
EKG QTC CALCULATION (BAZETT): 497 MS
EKG R AXIS: -15 DEGREES
EKG R AXIS: -19 DEGREES
EKG T AXIS: 14 DEGREES
EKG T AXIS: 30 DEGREES
EKG VENTRICULAR RATE: 71 BPM
EKG VENTRICULAR RATE: 96 BPM
GLUCOSE BLD-MCNC: 100 MG/DL (ref 70–99)
GLUCOSE BLD-MCNC: 117 MG/DL (ref 70–99)
GLUCOSE BLD-MCNC: 135 MG/DL (ref 70–99)
GLUCOSE BLD-MCNC: 99 MG/DL (ref 70–99)
GLUCOSE, URINE: NEGATIVE MG/DL
HCT VFR BLD CALC: 37.2 % (ref 37–47)
HEMOGLOBIN: 11.2 GM/DL (ref 12.5–16)
INR BLD: 1.7 INDEX
KETONES, URINE: NEGATIVE MG/DL
LEUKOCYTE ESTERASE, URINE: NEGATIVE
MCH RBC QN AUTO: 28.3 PG (ref 27–31)
MCHC RBC AUTO-ENTMCNC: 30.1 % (ref 32–36)
MCV RBC AUTO: 93.9 FL (ref 78–100)
NITRITE URINE, QUANTITATIVE: NEGATIVE
PDW BLD-RTO: 15.2 % (ref 11.7–14.9)
PH, URINE: 6 (ref 5–8)
PLATELET # BLD: 321 K/CU MM (ref 140–440)
PMV BLD AUTO: 10.7 FL (ref 7.5–11.1)
PROTEIN UA: NEGATIVE MG/DL
PROTHROMBIN TIME: 19.3 SECONDS (ref 9.12–12.5)
RBC # BLD: 3.96 M/CU MM (ref 4.2–5.4)
RBC URINE: <1 /HPF (ref 0–6)
SPECIFIC GRAVITY UA: >1.06 (ref 1–1.03)
SPECIFIC GRAVITY UA: ABNORMAL (ref 1–1.03)
SQUAMOUS EPITHELIAL: 2 /HPF
TRICHOMONAS: ABNORMAL /HPF
UROBILINOGEN, URINE: NORMAL MG/DL (ref 0.2–1)
WBC # BLD: 17.9 K/CU MM (ref 4–10.5)
WBC UA: <1 /HPF (ref 0–5)

## 2019-04-21 PROCEDURE — 85027 COMPLETE CBC AUTOMATED: CPT

## 2019-04-21 PROCEDURE — 96375 TX/PRO/DX INJ NEW DRUG ADDON: CPT

## 2019-04-21 PROCEDURE — 2580000003 HC RX 258: Performed by: HOSPITALIST

## 2019-04-21 PROCEDURE — 93010 ELECTROCARDIOGRAM REPORT: CPT | Performed by: INTERNAL MEDICINE

## 2019-04-21 PROCEDURE — 6360000002 HC RX W HCPCS: Performed by: HOSPITALIST

## 2019-04-21 PROCEDURE — 82962 GLUCOSE BLOOD TEST: CPT

## 2019-04-21 PROCEDURE — 6370000000 HC RX 637 (ALT 250 FOR IP): Performed by: HOSPITALIST

## 2019-04-21 PROCEDURE — G0378 HOSPITAL OBSERVATION PER HR: HCPCS

## 2019-04-21 PROCEDURE — 96376 TX/PRO/DX INJ SAME DRUG ADON: CPT

## 2019-04-21 PROCEDURE — 51798 US URINE CAPACITY MEASURE: CPT

## 2019-04-21 PROCEDURE — 36415 COLL VENOUS BLD VENIPUNCTURE: CPT

## 2019-04-21 PROCEDURE — 6370000000 HC RX 637 (ALT 250 FOR IP): Performed by: EMERGENCY MEDICINE

## 2019-04-21 PROCEDURE — 93005 ELECTROCARDIOGRAM TRACING: CPT | Performed by: PHYSICIAN ASSISTANT

## 2019-04-21 PROCEDURE — 2500000003 HC RX 250 WO HCPCS: Performed by: HOSPITALIST

## 2019-04-21 PROCEDURE — 85610 PROTHROMBIN TIME: CPT

## 2019-04-21 RX ORDER — QUETIAPINE FUMARATE 100 MG/1
100 TABLET, FILM COATED ORAL NIGHTLY
Status: DISCONTINUED | OUTPATIENT
Start: 2019-04-21 | End: 2019-04-22

## 2019-04-21 RX ORDER — BACLOFEN 10 MG/1
20 TABLET ORAL 3 TIMES DAILY
Status: DISCONTINUED | OUTPATIENT
Start: 2019-04-21 | End: 2019-04-25 | Stop reason: HOSPADM

## 2019-04-21 RX ORDER — OXYCODONE HYDROCHLORIDE AND ACETAMINOPHEN 5; 325 MG/1; MG/1
1 TABLET ORAL EVERY 4 HOURS PRN
Status: DISCONTINUED | OUTPATIENT
Start: 2019-04-21 | End: 2019-04-22

## 2019-04-21 RX ORDER — SODIUM CHLORIDE 0.9 % (FLUSH) 0.9 %
10 SYRINGE (ML) INJECTION EVERY 12 HOURS SCHEDULED
Status: DISCONTINUED | OUTPATIENT
Start: 2019-04-21 | End: 2019-04-25 | Stop reason: HOSPADM

## 2019-04-21 RX ORDER — ALBUTEROL SULFATE 90 UG/1
2 AEROSOL, METERED RESPIRATORY (INHALATION) 4 TIMES DAILY PRN
Status: DISCONTINUED | OUTPATIENT
Start: 2019-04-21 | End: 2019-04-25 | Stop reason: HOSPADM

## 2019-04-21 RX ORDER — FUROSEMIDE 20 MG/1
20 TABLET ORAL 2 TIMES DAILY
Status: DISCONTINUED | OUTPATIENT
Start: 2019-04-21 | End: 2019-04-21

## 2019-04-21 RX ORDER — DEXTROSE MONOHYDRATE 50 MG/ML
100 INJECTION, SOLUTION INTRAVENOUS PRN
Status: DISCONTINUED | OUTPATIENT
Start: 2019-04-21 | End: 2019-04-25 | Stop reason: HOSPADM

## 2019-04-21 RX ORDER — PANTOPRAZOLE SODIUM 40 MG/1
40 TABLET, DELAYED RELEASE ORAL DAILY
Status: DISCONTINUED | OUTPATIENT
Start: 2019-04-22 | End: 2019-04-25 | Stop reason: HOSPADM

## 2019-04-21 RX ORDER — WARFARIN SODIUM 3 MG/1
3 TABLET ORAL DAILY
Status: DISCONTINUED | OUTPATIENT
Start: 2019-04-21 | End: 2019-04-22

## 2019-04-21 RX ORDER — ACETAMINOPHEN 325 MG/1
650 TABLET ORAL EVERY 4 HOURS PRN
Status: DISCONTINUED | OUTPATIENT
Start: 2019-04-21 | End: 2019-04-25 | Stop reason: HOSPADM

## 2019-04-21 RX ORDER — PANTOPRAZOLE SODIUM 20 MG/1
20 TABLET, DELAYED RELEASE ORAL DAILY
Status: DISCONTINUED | OUTPATIENT
Start: 2019-04-21 | End: 2019-04-21

## 2019-04-21 RX ORDER — CLOPIDOGREL BISULFATE 75 MG/1
75 TABLET ORAL NIGHTLY
Status: DISCONTINUED | OUTPATIENT
Start: 2019-04-21 | End: 2019-04-25 | Stop reason: HOSPADM

## 2019-04-21 RX ORDER — NICOTINE POLACRILEX 4 MG
15 LOZENGE BUCCAL PRN
Status: DISCONTINUED | OUTPATIENT
Start: 2019-04-21 | End: 2019-04-25 | Stop reason: HOSPADM

## 2019-04-21 RX ORDER — PAROXETINE HYDROCHLORIDE 20 MG/1
20 TABLET, FILM COATED ORAL EVERY MORNING
Status: DISCONTINUED | OUTPATIENT
Start: 2019-04-21 | End: 2019-04-25 | Stop reason: HOSPADM

## 2019-04-21 RX ORDER — ONDANSETRON 2 MG/ML
4 INJECTION INTRAMUSCULAR; INTRAVENOUS EVERY 6 HOURS PRN
Status: DISCONTINUED | OUTPATIENT
Start: 2019-04-21 | End: 2019-04-25 | Stop reason: HOSPADM

## 2019-04-21 RX ORDER — DEXTROSE MONOHYDRATE 25 G/50ML
12.5 INJECTION, SOLUTION INTRAVENOUS PRN
Status: DISCONTINUED | OUTPATIENT
Start: 2019-04-21 | End: 2019-04-25 | Stop reason: HOSPADM

## 2019-04-21 RX ORDER — NITROGLYCERIN 0.4 MG/1
0.4 TABLET SUBLINGUAL EVERY 5 MIN PRN
Status: DISCONTINUED | OUTPATIENT
Start: 2019-04-21 | End: 2019-04-25 | Stop reason: HOSPADM

## 2019-04-21 RX ORDER — VALSARTAN 160 MG/1
320 TABLET ORAL DAILY
Status: DISCONTINUED | OUTPATIENT
Start: 2019-04-21 | End: 2019-04-25 | Stop reason: HOSPADM

## 2019-04-21 RX ORDER — ZOLPIDEM TARTRATE 5 MG/1
10 TABLET ORAL NIGHTLY PRN
Status: DISCONTINUED | OUTPATIENT
Start: 2019-04-21 | End: 2019-04-22

## 2019-04-21 RX ORDER — SODIUM CHLORIDE 0.9 % (FLUSH) 0.9 %
10 SYRINGE (ML) INJECTION PRN
Status: DISCONTINUED | OUTPATIENT
Start: 2019-04-21 | End: 2019-04-25 | Stop reason: HOSPADM

## 2019-04-21 RX ORDER — MORPHINE SULFATE 4 MG/ML
2 INJECTION, SOLUTION INTRAMUSCULAR; INTRAVENOUS
Status: DISCONTINUED | OUTPATIENT
Start: 2019-04-21 | End: 2019-04-22

## 2019-04-21 RX ORDER — LORAZEPAM 0.5 MG/1
0.5 TABLET ORAL 2 TIMES DAILY
Status: DISCONTINUED | OUTPATIENT
Start: 2019-04-21 | End: 2019-04-22

## 2019-04-21 RX ORDER — OXYCODONE HYDROCHLORIDE AND ACETAMINOPHEN 5; 325 MG/1; MG/1
2 TABLET ORAL EVERY 4 HOURS PRN
Status: DISCONTINUED | OUTPATIENT
Start: 2019-04-21 | End: 2019-04-22

## 2019-04-21 RX ORDER — DICYCLOMINE HCL 20 MG
20 TABLET ORAL
Status: DISCONTINUED | OUTPATIENT
Start: 2019-04-21 | End: 2019-04-25 | Stop reason: HOSPADM

## 2019-04-21 RX ORDER — DONEPEZIL HYDROCHLORIDE 10 MG/1
10 TABLET, FILM COATED ORAL NIGHTLY
Status: DISCONTINUED | OUTPATIENT
Start: 2019-04-21 | End: 2019-04-25 | Stop reason: HOSPADM

## 2019-04-21 RX ORDER — MAGNESIUM HYDROXIDE/ALUMINUM HYDROXICE/SIMETHICONE 120; 1200; 1200 MG/30ML; MG/30ML; MG/30ML
30 SUSPENSION ORAL ONCE
Status: COMPLETED | OUTPATIENT
Start: 2019-04-21 | End: 2019-04-21

## 2019-04-21 RX ORDER — GABAPENTIN 300 MG/1
300 CAPSULE ORAL 3 TIMES DAILY
Status: DISCONTINUED | OUTPATIENT
Start: 2019-04-21 | End: 2019-04-25 | Stop reason: HOSPADM

## 2019-04-21 RX ORDER — SODIUM CHLORIDE 9 MG/ML
INJECTION, SOLUTION INTRAVENOUS CONTINUOUS
Status: DISCONTINUED | OUTPATIENT
Start: 2019-04-21 | End: 2019-04-25 | Stop reason: HOSPADM

## 2019-04-21 RX ORDER — MAGNESIUM HYDROXIDE/ALUMINUM HYDROXICE/SIMETHICONE 120; 1200; 1200 MG/30ML; MG/30ML; MG/30ML
30 SUSPENSION ORAL EVERY 6 HOURS PRN
Status: DISCONTINUED | OUTPATIENT
Start: 2019-04-21 | End: 2019-04-25 | Stop reason: HOSPADM

## 2019-04-21 RX ORDER — AMLODIPINE BESYLATE 10 MG/1
10 TABLET ORAL NIGHTLY
Status: DISCONTINUED | OUTPATIENT
Start: 2019-04-21 | End: 2019-04-25 | Stop reason: HOSPADM

## 2019-04-21 RX ORDER — POTASSIUM CHLORIDE 750 MG/1
10 TABLET, FILM COATED, EXTENDED RELEASE ORAL DAILY
Status: DISCONTINUED | OUTPATIENT
Start: 2019-04-21 | End: 2019-04-25 | Stop reason: HOSPADM

## 2019-04-21 RX ADMIN — FAMOTIDINE 20 MG: 10 INJECTION, SOLUTION INTRAVENOUS at 08:54

## 2019-04-21 RX ADMIN — BACLOFEN 20 MG: 10 TABLET ORAL at 21:33

## 2019-04-21 RX ADMIN — LORAZEPAM 0.5 MG: 0.5 TABLET ORAL at 04:02

## 2019-04-21 RX ADMIN — SODIUM CHLORIDE: 9 INJECTION, SOLUTION INTRAVENOUS at 03:54

## 2019-04-21 RX ADMIN — QUETIAPINE FUMARATE 100 MG: 100 TABLET ORAL at 21:33

## 2019-04-21 RX ADMIN — SODIUM CHLORIDE: 9 INJECTION, SOLUTION INTRAVENOUS at 16:14

## 2019-04-21 RX ADMIN — DICYCLOMINE HYDROCHLORIDE 20 MG: 20 TABLET ORAL at 18:46

## 2019-04-21 RX ADMIN — CLOPIDOGREL BISULFATE 75 MG: 75 TABLET ORAL at 21:35

## 2019-04-21 RX ADMIN — GABAPENTIN 300 MG: 300 CAPSULE ORAL at 08:55

## 2019-04-21 RX ADMIN — ZOLPIDEM TARTRATE 10 MG: 5 TABLET ORAL at 21:33

## 2019-04-21 RX ADMIN — SODIUM CHLORIDE: 9 INJECTION, SOLUTION INTRAVENOUS at 21:32

## 2019-04-21 RX ADMIN — VALSARTAN 320 MG: 160 TABLET, FILM COATED ORAL at 08:54

## 2019-04-21 RX ADMIN — PANTOPRAZOLE SODIUM 20 MG: 20 TABLET, DELAYED RELEASE ORAL at 08:55

## 2019-04-21 RX ADMIN — ONDANSETRON 4 MG: 2 INJECTION INTRAMUSCULAR; INTRAVENOUS at 03:54

## 2019-04-21 RX ADMIN — MORPHINE SULFATE 2 MG: 4 INJECTION, SOLUTION INTRAMUSCULAR; INTRAVENOUS at 21:31

## 2019-04-21 RX ADMIN — ALUMINUM HYDROXIDE, MAGNESIUM HYDROXIDE, AND SIMETHICONE 30 ML: 200; 200; 20 SUSPENSION ORAL at 01:41

## 2019-04-21 RX ADMIN — AMLODIPINE BESYLATE 10 MG: 10 TABLET ORAL at 21:33

## 2019-04-21 RX ADMIN — LIDOCAINE HYDROCHLORIDE 15 ML: 20 SOLUTION ORAL; TOPICAL at 01:41

## 2019-04-21 RX ADMIN — OXYCODONE AND ACETAMINOPHEN 2 TABLET: 5; 325 TABLET ORAL at 03:55

## 2019-04-21 RX ADMIN — DICYCLOMINE HYDROCHLORIDE 20 MG: 20 TABLET ORAL at 21:33

## 2019-04-21 RX ADMIN — DICYCLOMINE HYDROCHLORIDE 20 MG: 20 TABLET ORAL at 03:54

## 2019-04-21 RX ADMIN — FUROSEMIDE 20 MG: 20 TABLET ORAL at 08:55

## 2019-04-21 RX ADMIN — WARFARIN SODIUM 3 MG: 3 TABLET ORAL at 18:46

## 2019-04-21 RX ADMIN — BACLOFEN 20 MG: 10 TABLET ORAL at 16:12

## 2019-04-21 RX ADMIN — CLONIDINE HYDROCHLORIDE 0.3 MG: 0.2 TABLET ORAL at 08:54

## 2019-04-21 RX ADMIN — DONEPEZIL HYDROCHLORIDE 10 MG: 10 TABLET, FILM COATED ORAL at 21:33

## 2019-04-21 RX ADMIN — BACLOFEN 20 MG: 10 TABLET ORAL at 08:54

## 2019-04-21 RX ADMIN — DICYCLOMINE HYDROCHLORIDE 20 MG: 20 TABLET ORAL at 08:54

## 2019-04-21 RX ADMIN — GABAPENTIN 300 MG: 300 CAPSULE ORAL at 16:12

## 2019-04-21 RX ADMIN — PAROXETINE HYDROCHLORIDE 20 MG: 20 TABLET, FILM COATED ORAL at 08:55

## 2019-04-21 RX ADMIN — DICYCLOMINE HYDROCHLORIDE 20 MG: 20 TABLET ORAL at 13:12

## 2019-04-21 RX ADMIN — GABAPENTIN 300 MG: 300 CAPSULE ORAL at 21:33

## 2019-04-21 RX ADMIN — POTASSIUM CHLORIDE 10 MEQ: 750 TABLET, EXTENDED RELEASE ORAL at 08:55

## 2019-04-21 RX ADMIN — LORAZEPAM 0.5 MG: 0.5 TABLET ORAL at 21:33

## 2019-04-21 ASSESSMENT — PAIN DESCRIPTION - PAIN TYPE
TYPE: ACUTE PAIN
TYPE: ACUTE PAIN

## 2019-04-21 ASSESSMENT — PAIN DESCRIPTION - ORIENTATION: ORIENTATION: MID

## 2019-04-21 ASSESSMENT — PAIN SCALES - GENERAL
PAINLEVEL_OUTOF10: 8
PAINLEVEL_OUTOF10: 7
PAINLEVEL_OUTOF10: 5
PAINLEVEL_OUTOF10: 6
PAINLEVEL_OUTOF10: 8

## 2019-04-21 ASSESSMENT — PAIN DESCRIPTION - FREQUENCY
FREQUENCY: CONTINUOUS
FREQUENCY: INTERMITTENT

## 2019-04-21 ASSESSMENT — PAIN DESCRIPTION - LOCATION
LOCATION: GENERALIZED;CHEST
LOCATION: CHEST

## 2019-04-21 ASSESSMENT — PAIN DESCRIPTION - DESCRIPTORS: DESCRIPTORS: PRESSURE;THROBBING

## 2019-04-21 ASSESSMENT — PAIN DESCRIPTION - ONSET: ONSET: ON-GOING

## 2019-04-21 ASSESSMENT — PAIN DESCRIPTION - PROGRESSION: CLINICAL_PROGRESSION: NOT CHANGED

## 2019-04-21 NOTE — PROGRESS NOTES
Gaviota Carter is a 76 y.o. female patient states has epigastric pain.  Also feeling of food \" sticking \"at upper abd with reflux as \" sour taste\"    Current Facility-Administered Medications   Medication Dose Route Frequency Provider Last Rate Last Dose    albuterol sulfate  (90 Base) MCG/ACT inhaler 2 puff  2 puff Inhalation 4x Daily PRN Nova Harris MD        amLODIPine (NORVASC) tablet 10 mg  10 mg Oral Nightly Nova Harris MD        baclofen (LIORESAL) tablet 20 mg  20 mg Oral TID Nova Harris MD        cloNIDine (CATAPRES) tablet 0.3 mg  0.3 mg Oral TID Nova Harris MD        clopidogrel (PLAVIX) tablet 75 mg  75 mg Oral Nightly Nova Harris MD        donepezil (ARICEPT) tablet 10 mg  10 mg Oral Nightly Nova Harris MD        furosemide (LASIX) tablet 20 mg  20 mg Oral BID Nova Harris MD        gabapentin (NEURONTIN) capsule 300 mg  300 mg Oral TID Nova Harris MD        LORazepam (ATIVAN) tablet 0.5 mg  0.5 mg Oral BID Nova Harris MD   0.5 mg at 04/21/19 0402    nitroGLYCERIN (NITROSTAT) SL tablet 0.4 mg  0.4 mg Sublingual Q5 Min PRN Nova Harris MD        pantoprazole (PROTONIX) tablet 20 mg  20 mg Oral Daily Nova Harris MD        PARoxetine (PAXIL) tablet 20 mg  20 mg Oral QAM Nova Harris MD        potassium chloride (KLOR-CON) extended release tablet 10 mEq  10 mEq Oral Daily Nova Harris MD        QUEtiapine (SEROQUEL) tablet 100 mg  100 mg Oral Nightly Nova Harris MD        valsartan (DIOVAN) tablet 320 mg  320 mg Oral Daily Nova Harris MD        zolpidem (AMBIEN) tablet 10 mg  10 mg Oral Nightly PRN Nova Harris MD        sodium chloride flush 0.9 % injection 10 mL  10 mL Intravenous 2 times per day Nova Harris MD        sodium chloride flush 0.9 % injection 10 mL  10 mL Intravenous PRN Nova Harris MD        magnesium hydroxide (MILK OF MAGNESIA) 400 MG/5ML suspension 30 mL  30 mL Oral Daily PRN Nova Harris MD        ondansetron (ZOFRAN) injection 4 mg  4 mg Intravenous Q6H PRN Nova Harris MD   4 mg at 04/21/19 0354    acetaminophen (TYLENOL) tablet 650 mg  650 mg Oral Q4H PRN Toya Osman MD        dicyclomine (BENTYL) tablet 20 mg  20 mg Oral 4x Daily AC & HS Toya Osman MD   20 mg at 04/21/19 0354    aluminum & magnesium hydroxide-simethicone (MAALOX) 200-200-20 MG/5ML suspension 30 mL  30 mL Oral Q6H PRN Toya Osman MD        morphine sulfate (PF) injection 2 mg  2 mg Intravenous Q2H PRN Toya Osman MD        oxyCODONE-acetaminophen (PERCOCET) 5-325 MG per tablet 1 tablet  1 tablet Oral Q4H PRN Toya Osman MD        Or    oxyCODONE-acetaminophen (PERCOCET) 5-325 MG per tablet 2 tablet  2 tablet Oral Q4H PRN Toya Osman MD   2 tablet at 04/21/19 0355    0.9 % sodium chloride infusion   Intravenous Continuous Toya Osman  mL/hr at 04/21/19 0354      famotidine (PEPCID) injection 20 mg  20 mg Intravenous BID Toya Osman MD        insulin lispro (HUMALOG) injection vial 0-6 Units  0-6 Units Subcutaneous TID WC Toya Osman MD        insulin lispro (HUMALOG) injection vial 0-3 Units  0-3 Units Subcutaneous Nightly Yesi Jones MD        glucose (GLUTOSE) 40 % oral gel 15 g  15 g Oral PRN Toya Osman MD        dextrose 50 % solution 12.5 g  12.5 g Intravenous PRN Toya Osman MD        glucagon (rDNA) injection 1 mg  1 mg Intramuscular PRN Toya Osman MD        dextrose 5 % solution  100 mL/hr Intravenous PRN Toya Osman MD        warfarin (COUMADIN) tablet 3 mg  3 mg Oral Daily Toya Omsan MD         Allergies   Allergen Reactions    Tetracyclines & Related Other (See Comments)     hallucinations    Bactrim [Sulfamethoxazole-Trimethoprim] Diarrhea    Codeine Hives     Active Problems:    Abdominal pain  Resolved Problems:    * No resolved hospital problems. *    Blood pressure (!) 179/79, pulse 81, temperature 98.1 °F (36.7 °C), temperature source Oral, resp. rate 18, height 5' 7\" (1.702 m), weight 202 lb (91.6 kg), SpO2 100 %, not currently breastfeeding. Subjective:  Symptoms:  Stable.     Diet:  Poor intake. Pain:  She complains of pain that is mild. Objective:  General Appearance:  Comfortable. Vital signs: (most recent): Blood pressure (!) 148/69, pulse 83, temperature 98.6 °F (37 °C), temperature source Oral, resp. rate 18, height 5' 7\" (1.702 m), weight 202 lb (91.6 kg), SpO2 95 %, not currently breastfeeding. Vital signs are normal.    HEENT: Normal HEENT exam.    Lungs:  Normal effort. Heart: Normal rate. Abdomen: Abdomen is soft. There is epigastric tenderness. Extremities: Decreased range of motion. Neurological: Patient is alert. Pupils:  Pupils are equal, round, and reactive to light. Skin:  Warm.       Assessment & Plan  Abd pain with( diarrhea--> none today)  -CT abd with resolution of pancolitis  -diarrhea had resolved so hold stool cx and cdiff  -leukocytosis  -repeat CBC  -has epigastric pain with sticking sensation and consult GI as may need esophageal dilatation  -consult cardio as epigastric could be cardiac  -lipase wnl  CAD  -plavix  PAF  -INR and coumadin  HTN  -CCB, clonidine, ARB  -Dementia  -aricept  PD  -sinemet  DVT prophylaxis  -coumadin  Lissy Meza MD  4/21/2019

## 2019-04-21 NOTE — ED NOTES
Pt straight cath to obtain urine sample; pt attempted to use bed pan as well as bedside commode before doing straight cath; Annalise Gibson RN  04/20/19 6421

## 2019-04-21 NOTE — CONSULTS
[address], [address] [facility]                                  CONSULTATION    PATIENT NAME: Angelina Gan                    :        1950  MED REC NO:   [id number]                          ROOM:       4101  ACCOUNT NO:   [de-identified]                           ADMIT DATE: 2019  PROVIDER:     Ramakrishna Patel MD    CONSULT DATE:  2019    INDICATION:  Coronary artery disease. HISTORY OF PRESENT ILLNESS:  This is a 70-year-old female patient who  came to the hospital with having abdominal pain, nausea, vomiting  present. She is found to have paroxysmal atrial fibrillation present;  therefore, cardiology consult is obtained. She is feeling better right  now, but she came in yesterday with having abdominal pain, nausea,  vomiting present. The patient has history of coronary artery disease present. She had a  heart catheterization done recently. She had a Holter done back in  2018 and Holter showed sinus rhythm, sinus tachycardia, PACs and PVCs  noted. Echo is pending. The patient's heart catheterization had showed that she was found to  have left main patent, LAD with 50% in-stent stenosis. FFR is 0.9. Circ had mild disease, RCA had mild disease present. Right heart  catheterization showed it to be stable. The patient was found to have  moderate CAD present. She was treated medically. She was also checked  for home oxygen, sleep apnea. PAST MEDICAL HISTORY:  History of coronary artery disease, status post  angioplasty done in the past in . Recent heart cath in 2019 showed  moderate disease, no intervention was done. Hypertension,  hyperlipidemia, moderate pulmonary hypertension present. She has  history of paroxysmal atrial fibrillation present and she is on Coumadin  for that.     PAST SURGICAL HISTORY:  Appendectomy, gallbladder surgery, fibroid  removed from breast, hysterectomy and

## 2019-04-21 NOTE — ED NOTES
Report received from [facility], care assumed at this time      Reanna Presley RN  04/21/19 0025

## 2019-04-21 NOTE — ED PROVIDER NOTES
eMERGENCY dEPARTMENT eNCOUnter    Attending note    I cared for and evaluated the patient in conjunction with the ED Advanced Practice Provider    HPI/Physical Exam/Medical Decision Making  Travis Gandhi is a 76 y.o. female here for evaluation of epigastric pain, chest pain, and nausea. Her symptoms started a week ago. She was diagnosed with colitis for which she has been taking Augmentin. Her pain got worse today and so she came in for evaluation. She has a known history of CAD. She had a recent cardiac catheterization (4/1/19) as below. Please see RONALDO note for further details. Cardiac cath 4/1/19  DICTATED --[id number]  LEFT MAIN PATENT  LAD MID 50% INSTENT -FFR 0.9  LCX DOMINANT MILD DX  RCA MID STENT PATENT  LVEDP 13-15  AO SAT 93%  PA SAT-67%  RA-15/13/11  RV-39/3/16  PA=37/17/25  PCWP--20/23/16     MODERATE CAD MEDICAL TREATMENT  MILD PULMONARY HTN  CHECK FOR HOME OXYGEN AND ARTURO  WILL GET SLEEP STUDY AS OUTPATIENT       Vitals:   ED Triage Vitals   Enc Vitals Group      BP 04/20/19 1838 (!) 162/72      Pulse 04/20/19 1830 87      Resp 04/20/19 1830 22      Temp 04/20/19 2322 99 °F (37.2 °C)      Temp Source 04/20/19 2322 Oral      SpO2 04/20/19 1830 100 %      Weight 04/20/19 2322 202 lb (91.6 kg)      Height 04/20/19 2322 5' 7\" (1.702 m)      Head Circumference --       Peak Flow --       Pain Score --       Pain Loc --       Pain Edu? --       Excl.  in [address]? --        EKG Interpretation #1 (19:43)  Interpreted by me  Compared to 4/9/19  Rhythm: normal sinus   Rate: normal 96  Axis: normal  Ectopy: none  Conduction: prolonged QT  ST Segments: no acute change  T Waves: no acute change  Q Waves: none  Clinical Impression: normal sinus rhythm, prolonged QT      Cardiac Monitor Strip Interpretation  Interpreted by me  Monitor strip interpreted for greater than 10 seconds  Rhythm: normal sinus   Rate: normal  Ectopy: none  ST Segments: normal      EKG Interpretation # 2 (00:45 am)  Interpreted by me  Compared to previous EKG from today  Rhythm: normal sinus   Rate: normal 71  Axis: normal  Ectopy: none  Conduction: normal  ST Segments: no acute change  T Waves: no acute change  Q Waves: none  Clinical Impression: normal sinus rhythm, prolonged QT has resolved      On exam, the patient is afebrile and nontoxic appearing. She is hemodynamically stable and neurologically intact. Airway is patent. Neck is supple. Heart sounds have a regular rate and rhythm. Lungs are clear to auscultation bilaterally. The patient's abdomen is soft, non-tender and non-distended with no peritoneal signs. Extremities are warm, pink, and well perfused. EKG shows a normal sinus rhythm with no ST elevation or depression. Labs are obtained and are significant for leukocytosis with a left shift. Specific gravity of the urine is increased which is consistent with dehydration. The patient was treated with morphine, Zofran and a GI cocktail. She continued to not feel well. The patient will be admitted to the hospitalist for further evaluation and treatment. All diagnostic, treatment, and disposition decisions were made by myself in conjunction with the advanced practice provider. For all further details of the patient's emergency department visit, please see the advanced practice provider's documentation. Comment: Please note this report has been produced using speech recognition software and may contain errors related to that system including errors in grammar, punctuation, and spelling, as well as words and phrases that may be inappropriate. If there are any questions or concerns please feel free to contact the dictating provider for clarification.         Yuni Ulloa MD  04/24/19 0878

## 2019-04-21 NOTE — ED NOTES
Report given to [facility], [facility]. for bed 4101, states they will be down to get pt shortly.      Kelly Lambert RN  04/21/19 6814
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
## 2019-04-21 NOTE — PROGRESS NOTES
Dr. Tony Mendenhall (on call for Dr. Horatio Dandy) paged regarding GI consult from Dr. Juan Sweet. Dr. Tony Mendenhall advised to inform Dr. Horatio Dandy of the consult tomorrow morning.    Electronically signed by Dorothy Walters RN on 4/21/2019 at 12:00 PM

## 2019-04-21 NOTE — H&P
History and Physical      Name:  Zahira Wilkins /Age/Sex: 1950  (76 y.o. female)   MRN & CSN:  [id number] & [id number] Admission Date/Time: 2019  6:02 PM   Location:  [address] [place] [address] PCP: Marissa Boeck, MD       Hospital Day: 2    Assessment and Plan:   Zahira Wilkins is a 76 y.o.  female  who presents with abdominal pain and persistent diarrhea    Persistent diarrhea  Dehydration  Possible Gastroenteritis  Labs remarkable for elevated wbc count 19,000  CMP wnl  Trop and BNP wnl  CXR unremarkable  EKG reviewed and is negative for acute ischemic changes  CT scan abdomen with contrast showed no acute findings and resolution of the previous colitis   UA wnl  Keep patient for observation  IV hydration  Orthostatic vs  Analgesics/antiemetics, antispasmodics  Famotidine  Maalox  Repeat CBC  Check C.diff if has more diarrheal     CAD  -trop wnl  -Plavix, Nitrate, and statins  -recent cath report noted    Paroxysmal A-fib  HR well controlled  Check INR  Pharmacy to dose Coumadin    HTN, uncontrolled likely 2/2 combination of anxiety and pain  -cont home meds  -Pain control  -Antianxiety per home regimen    Dementia  -Aricept     Chronic back pain  -Robaxin     PD  -Sinemet     DMII  -Hold metformin due to contrast  -Monitor FSBS and start SSI      Depression  -Paxil and Seroquel      Diet DIET CARDIAC;   DVT Prophylaxis [] warfarin   GI Prophylaxis [] PPI, [] H2 Blocker, [x] No GI prophylaxis, patient is receiving diet/Tube Feeds   Code Status Full Code             History of Present Illness:     Chief Complaint: abdominal pain and diarrhea    Zahira Wilkins is a 76 y.o.  female  With CAD s/p PCI, DM, HTN who presents with multiple complaints, she states that 3 weeks ago she started having diarrhea and was diagnosed with colitis, chest was prescribed Augmentin for it, her symptoms continued to worsen, diarrhea has persisted for 3 weeks, last BM Saturday morning that was also loose, she has been having worsening lower abdominal pain, nausea, poor appetite but no vomiting, she also reports feeling weak and dizzy and that she almost fainted on saturday. She also reports epigastric sharp pain that feels different from her usual chest pain. She had a recent cardiac cath on 04/09 that showed mod CAD and some in stent restenosis, medical management was recommended for it. Ten point ROS reviewed negative, unless as noted above    Objective:   No intake or output data in the 24 hours ending 04/21/19 0345   Vitals:   Vitals:    04/21/19 0230   BP: (!) 166/74   Pulse: 80   Resp: 18   Temp: 98.2 °F (36.8 °C)   SpO2: 99%     Physical Exam:    GEN Awake female, in distress due to pain  EYES Pupils are equally round. No scleral erythema, discharge, or conjunctivitis. HENT Mucous membranes are moist.   NECK No apparent thyromegaly or masses. RESP Clear to auscultation, no wheezes, rales or rhonchi. Symmetric chest movement while on room air. CARDIO/VASC S1/S2 auscultated. Regular rate without appreciable murmurs, rubs, or gallops. Peripheral pulses equal bilaterally and palpable. No peripheral edema. GI Abdomen is soft with modt tenderness of epigastrium, RLQ and LLQ, no masses, or guarding. Bowel sounds are normoactive. Rectal exam deferred. SKIN Normal coloration, warm, dry. NEURO Cranial nerves appear grossly intact, normal speech, no lateralizing weakness. PSYCH Awake, alert, oriented x 4. Affect appropriate.     Past Medical History:      Past Medical History:   Diagnosis Date    A-fib [facility])     ACS (acute coronary syndrome) ([facility] [address] 6/16/2014    TRINO (acute kidney injury) ([facility] [address] 5/21/2017    Anxiety disorder     Arthritis     Atrial fibrillation ([facility] [address]     Blood transfusion     CAD (coronary artery disease)     \"have 6 heart stents- follows with Dr Mac Overton Cerebral artery occlusion with cerebral infarction ([facility] [address]     CHF (congestive heart failure) ([facility] [address]     pt. denies this dx    Coronary Robby Goss MD   amLODIPine (NORVASC) 10 MG tablet Take 1 tablet by mouth nightly Hold for SBP < 130 11/5/18   Robby Goss MD   cloNIDine (CATAPRES) 0.2 MG tablet Take 0.3 mg by mouth 3 times daily  10/28/18   Historical Provider, MD   donepezil (ARICEPT) 10 MG tablet Take 10 mg by mouth nightly 10/8/18   Historical Provider, MD   potassium chloride (KLOR-CON) 10 MEQ extended release tablet Take 10 mEq by mouth daily  10/8/18   Historical Provider, MD   QUEtiapine (SEROQUEL) 100 MG tablet Take 100 mg by mouth nightly  10/10/18   Historical Provider, MD   PARoxetine (PAXIL) 10 MG tablet Take 20 mg by mouth every morning     Historical Provider, MD   acetaminophen (TYLENOL) 325 MG tablet Take 2 tablets by mouth every 4 hours as needed for Pain 9/12/18   Eve Hamilton MD   warfarin (COUMADIN) 1 MG tablet Take 3 tablets by mouth daily Adjust dosing based on INR  Patient taking differently: Take 2 mg by mouth See Admin Instructions Adjust dosing based on INR  4mg on Sun & Wed; 2mg all other days 9/12/18   Eve Hamilton MD   metFORMIN (GLUCOPHAGE) 500 MG tablet Take 500 mg by mouth daily (with breakfast)    Historical Provider, MD   nitroGLYCERIN (NITROSTAT) 0.4 MG SL tablet Place 1 tablet under the tongue every 5 minutes as needed for Chest pain.  6/17/14   Josee Barker PA-C   clopidogrel (PLAVIX) 75 MG tablet Take 75 mg by mouth nightly     Historical Provider, MD      Medications:    amLODIPine  10 mg Oral Nightly    baclofen  20 mg Oral TID    cloNIDine  0.3 mg Oral TID    clopidogrel  75 mg Oral Nightly    donepezil  10 mg Oral Nightly    furosemide  20 mg Oral BID    gabapentin  300 mg Oral TID    LORazepam  0.5 mg Oral BID    pantoprazole  20 mg Oral Daily    PARoxetine  20 mg Oral QAM    potassium chloride  10 mEq Oral Daily    QUEtiapine  100 mg Oral Nightly    valsartan  320 mg Oral Daily    sodium chloride flush  10 mL Intravenous 2 times per day    dicyclomine  20 mg Oral 4x Daily AC & HS   

## 2019-04-22 ENCOUNTER — APPOINTMENT (OUTPATIENT)
Dept: CT IMAGING | Age: 69
DRG: 385 | End: 2019-04-22
Payer: MEDICARE

## 2019-04-22 ENCOUNTER — APPOINTMENT (OUTPATIENT)
Dept: GENERAL RADIOLOGY | Age: 69
DRG: 385 | End: 2019-04-22
Payer: MEDICARE

## 2019-04-22 ENCOUNTER — APPOINTMENT (OUTPATIENT)
Dept: ULTRASOUND IMAGING | Age: 69
DRG: 385 | End: 2019-04-22
Payer: MEDICARE

## 2019-04-22 LAB
AMMONIA: 75 UMOL/L (ref 11–51)
BASE EXCESS: ABNORMAL (ref 0–2.4)
CARBON MONOXIDE, BLOOD: 3.1 % (ref 0–5)
CO2 CONTENT: 21.3 MMOL/L (ref 19–24)
COMMENT: ABNORMAL
EKG ATRIAL RATE: 97 BPM
EKG DIAGNOSIS: NORMAL
EKG P AXIS: 52 DEGREES
EKG P-R INTERVAL: 144 MS
EKG Q-T INTERVAL: 380 MS
EKG QRS DURATION: 82 MS
EKG QTC CALCULATION (BAZETT): 482 MS
EKG R AXIS: -21 DEGREES
EKG T AXIS: 28 DEGREES
EKG VENTRICULAR RATE: 97 BPM
GLUCOSE BLD-MCNC: 108 MG/DL (ref 70–99)
GLUCOSE BLD-MCNC: 114 MG/DL (ref 70–99)
GLUCOSE BLD-MCNC: 119 MG/DL (ref 70–99)
GLUCOSE BLD-MCNC: 120 MG/DL (ref 70–99)
GLUCOSE BLD-MCNC: 124 MG/DL (ref 70–99)
HCO3 ARTERIAL: 20.4 MMOL/L (ref 18–23)
HCT VFR BLD CALC: 33.7 % (ref 37–47)
HEMOGLOBIN: 9.7 GM/DL (ref 12.5–16)
INR BLD: 1.54 INDEX
LV EF: 58 %
LVEF MODALITY: NORMAL
MCH RBC QN AUTO: 28.8 PG (ref 27–31)
MCHC RBC AUTO-ENTMCNC: 28.8 % (ref 32–36)
MCV RBC AUTO: 100 FL (ref 78–100)
METHEMOGLOBIN ARTERIAL: 1.2 %
O2 SATURATION: 84.5 % (ref 96–97)
PCO2 ARTERIAL: 28 MMHG (ref 32–45)
PDW BLD-RTO: 15.7 % (ref 11.7–14.9)
PH BLOOD: 7.47 (ref 7.34–7.45)
PLATELET # BLD: 208 K/CU MM (ref 140–440)
PMV BLD AUTO: 10.4 FL (ref 7.5–11.1)
PO2 ARTERIAL: 40 MMHG (ref 75–100)
PROTHROMBIN TIME: 17.8 SECONDS (ref 9.12–12.5)
RBC # BLD: 3.37 M/CU MM (ref 4.2–5.4)
TROPONIN T: <0.01 NG/ML
WBC # BLD: 13.6 K/CU MM (ref 4–10.5)

## 2019-04-22 PROCEDURE — 6360000002 HC RX W HCPCS

## 2019-04-22 PROCEDURE — 2580000003 HC RX 258: Performed by: HOSPITALIST

## 2019-04-22 PROCEDURE — G0378 HOSPITAL OBSERVATION PER HR: HCPCS

## 2019-04-22 PROCEDURE — 82962 GLUCOSE BLOOD TEST: CPT

## 2019-04-22 PROCEDURE — 6370000000 HC RX 637 (ALT 250 FOR IP): Performed by: HOSPITALIST

## 2019-04-22 PROCEDURE — 6360000002 HC RX W HCPCS: Performed by: HOSPITALIST

## 2019-04-22 PROCEDURE — 93306 TTE W/DOPPLER COMPLETE: CPT

## 2019-04-22 PROCEDURE — 82140 ASSAY OF AMMONIA: CPT

## 2019-04-22 PROCEDURE — 93010 ELECTROCARDIOGRAM REPORT: CPT | Performed by: INTERNAL MEDICINE

## 2019-04-22 PROCEDURE — 71045 X-RAY EXAM CHEST 1 VIEW: CPT

## 2019-04-22 PROCEDURE — 87086 URINE CULTURE/COLONY COUNT: CPT

## 2019-04-22 PROCEDURE — 2500000003 HC RX 250 WO HCPCS: Performed by: HOSPITALIST

## 2019-04-22 PROCEDURE — 96375 TX/PRO/DX INJ NEW DRUG ADDON: CPT

## 2019-04-22 PROCEDURE — 96365 THER/PROPH/DIAG IV INF INIT: CPT

## 2019-04-22 PROCEDURE — 93970 EXTREMITY STUDY: CPT

## 2019-04-22 PROCEDURE — 93225 XTRNL ECG REC<48 HRS REC: CPT

## 2019-04-22 PROCEDURE — 2140000000 HC CCU INTERMEDIATE R&B

## 2019-04-22 PROCEDURE — 2500000003 HC RX 250 WO HCPCS

## 2019-04-22 PROCEDURE — 36415 COLL VENOUS BLD VENIPUNCTURE: CPT

## 2019-04-22 PROCEDURE — 82803 BLOOD GASES ANY COMBINATION: CPT

## 2019-04-22 PROCEDURE — 70450 CT HEAD/BRAIN W/O DYE: CPT

## 2019-04-22 PROCEDURE — 85027 COMPLETE CBC AUTOMATED: CPT

## 2019-04-22 PROCEDURE — 84484 ASSAY OF TROPONIN QUANT: CPT

## 2019-04-22 PROCEDURE — 2580000003 HC RX 258

## 2019-04-22 PROCEDURE — 85610 PROTHROMBIN TIME: CPT

## 2019-04-22 PROCEDURE — 93005 ELECTROCARDIOGRAM TRACING: CPT | Performed by: HOSPITALIST

## 2019-04-22 RX ORDER — WARFARIN SODIUM 4 MG/1
4 TABLET ORAL DAILY
Status: DISCONTINUED | OUTPATIENT
Start: 2019-04-22 | End: 2019-04-24

## 2019-04-22 RX ORDER — FLUMAZENIL 0.1 MG/ML
0.5 INJECTION, SOLUTION INTRAVENOUS ONCE
Status: COMPLETED | OUTPATIENT
Start: 2019-04-22 | End: 2019-04-22

## 2019-04-22 RX ORDER — NALOXONE HYDROCHLORIDE 0.4 MG/ML
0.4 INJECTION, SOLUTION INTRAMUSCULAR; INTRAVENOUS; SUBCUTANEOUS PRN
Status: DISCONTINUED | OUTPATIENT
Start: 2019-04-22 | End: 2019-04-25 | Stop reason: HOSPADM

## 2019-04-22 RX ORDER — LACTULOSE 10 G/15ML
20 SOLUTION ORAL 2 TIMES DAILY
Status: DISCONTINUED | OUTPATIENT
Start: 2019-04-22 | End: 2019-04-25 | Stop reason: HOSPADM

## 2019-04-22 RX ORDER — NALOXONE HYDROCHLORIDE 0.4 MG/ML
0.4 INJECTION, SOLUTION INTRAMUSCULAR; INTRAVENOUS; SUBCUTANEOUS PRN
Status: DISCONTINUED | OUTPATIENT
Start: 2019-04-22 | End: 2019-04-22 | Stop reason: SDUPTHER

## 2019-04-22 RX ADMIN — DICYCLOMINE HYDROCHLORIDE 20 MG: 20 TABLET ORAL at 06:20

## 2019-04-22 RX ADMIN — DONEPEZIL HYDROCHLORIDE 10 MG: 10 TABLET, FILM COATED ORAL at 21:56

## 2019-04-22 RX ADMIN — CLONIDINE HYDROCHLORIDE 0.3 MG: 0.2 TABLET ORAL at 21:55

## 2019-04-22 RX ADMIN — NALOXONE HYDROCHLORIDE 0.4 MG: 0.4 INJECTION, SOLUTION INTRAMUSCULAR; INTRAVENOUS; SUBCUTANEOUS at 08:51

## 2019-04-22 RX ADMIN — FLUMAZENIL 0.5 MG: 0.1 INJECTION, SOLUTION INTRAVENOUS at 08:49

## 2019-04-22 RX ADMIN — DICYCLOMINE HYDROCHLORIDE 20 MG: 20 TABLET ORAL at 17:04

## 2019-04-22 RX ADMIN — WARFARIN SODIUM 4 MG: 4 TABLET ORAL at 18:33

## 2019-04-22 RX ADMIN — SODIUM CHLORIDE: 9 INJECTION, SOLUTION INTRAVENOUS at 06:20

## 2019-04-22 RX ADMIN — SODIUM CHLORIDE: 9 INJECTION, SOLUTION INTRAVENOUS at 13:49

## 2019-04-22 RX ADMIN — DICYCLOMINE HYDROCHLORIDE 20 MG: 20 TABLET ORAL at 21:55

## 2019-04-22 RX ADMIN — CEFTRIAXONE 1 G: 1 INJECTION, POWDER, FOR SOLUTION INTRAMUSCULAR; INTRAVENOUS at 13:49

## 2019-04-22 RX ADMIN — PANTOPRAZOLE SODIUM 40 MG: 40 TABLET, DELAYED RELEASE ORAL at 06:20

## 2019-04-22 RX ADMIN — LACTULOSE 20 G: 10 SOLUTION ORAL at 17:04

## 2019-04-22 RX ADMIN — CLONIDINE HYDROCHLORIDE 0.3 MG: 0.2 TABLET ORAL at 15:57

## 2019-04-22 RX ADMIN — BACLOFEN 20 MG: 10 TABLET ORAL at 21:54

## 2019-04-22 RX ADMIN — GABAPENTIN 300 MG: 300 CAPSULE ORAL at 15:57

## 2019-04-22 RX ADMIN — AMLODIPINE BESYLATE 10 MG: 10 TABLET ORAL at 21:55

## 2019-04-22 RX ADMIN — GABAPENTIN 300 MG: 300 CAPSULE ORAL at 21:55

## 2019-04-22 RX ADMIN — FLUMAZENIL 0.5 MG: 0.1 INJECTION, SOLUTION INTRAVENOUS at 08:50

## 2019-04-22 RX ADMIN — NALOXONE HYDROCHLORIDE 0.4 MG: 0.4 INJECTION, SOLUTION INTRAMUSCULAR; INTRAVENOUS; SUBCUTANEOUS at 08:48

## 2019-04-22 RX ADMIN — BACLOFEN 20 MG: 10 TABLET ORAL at 15:57

## 2019-04-22 ASSESSMENT — PAIN SCALES - GENERAL
PAINLEVEL_OUTOF10: 5
PAINLEVEL_OUTOF10: 5
PAINLEVEL_OUTOF10: 0

## 2019-04-22 ASSESSMENT — PAIN DESCRIPTION - LOCATION
LOCATION: NECK
LOCATION: NECK

## 2019-04-22 ASSESSMENT — PAIN DESCRIPTION - FREQUENCY: FREQUENCY: CONTINUOUS

## 2019-04-22 ASSESSMENT — PAIN DESCRIPTION - ONSET: ONSET: ON-GOING

## 2019-04-22 ASSESSMENT — PAIN DESCRIPTION - PAIN TYPE: TYPE: ACUTE PAIN

## 2019-04-22 ASSESSMENT — PAIN DESCRIPTION - DESCRIPTORS: DESCRIPTORS: ACHING

## 2019-04-22 NOTE — PROGRESS NOTES
Patient had a third degree heart block noted on Telemetry monitor. Assessed patient,patient had decrease in LOC, was not responding appropriately. Recorded patients vitals. Tried to interact with patient, patient was not responding to questions. Called a Rapid Response on patient.

## 2019-04-22 NOTE — PROGRESS NOTES
QAM    potassium chloride  10 mEq Oral Daily    QUEtiapine  100 mg Oral Nightly    valsartan  320 mg Oral Daily    sodium chloride flush  10 mL Intravenous 2 times per day    dicyclomine  20 mg Oral 4x Daily AC & HS    insulin lispro  0-6 Units Subcutaneous TID WC    insulin lispro  0-3 Units Subcutaneous Nightly    pantoprazole  40 mg Oral Daily      Infusions:   sodium chloride 100 mL/hr at 04/22/19 0620    dextrose        PRN Meds:  naloxone, albuterol sulfate HFA, nitroGLYCERIN, sodium chloride flush, magnesium hydroxide, ondansetron, acetaminophen, aluminum & magnesium hydroxide-simethicone, glucose, dextrose, glucagon (rDNA), dextrose       Physical Exam:  Vitals:    04/22/19 1200   BP: (!) 177/66   Pulse:    Resp:    Temp:    SpO2:         General: AAO, NAD  Chest: Nontender  Cardiac: First and Second Heart Sounds are Normal, No Murmurs or Gallops noted  Lungs:Clear to auscultation and percussion. Abdomen: Soft, NT, ND, +BS  Extremities: No clubbing, no edema  Vascular:  Equal 2+ peripheral pulses. Lab Data:  CBC:   Recent Labs     04/20/19  1835 04/21/19  0414 04/22/19  0550   WBC 19.4* 17.9* 13.6*   HGB 12.0* 11.2* 9.7*   HCT 38.9 37.2 33.7*   MCV 92.4 93.9 100.0    321 208     BMP:   Recent Labs     04/20/19  1835      K 4.3   CL 98*   CO2 24   BUN 15   CREATININE 0.9     LIVER PROFILE:   Recent Labs     04/20/19  1835   AST 18   ALT 15   LIPASE 31   BILITOT 0.3   ALKPHOS 136*     PT/INR:   Recent Labs     04/21/19  0414 04/22/19  0550   PROTIME 19.3* 17.8*   INR 1.70 1.54     APTT: No results for input(s): APTT in the last 72 hours. BNP:  No results for input(s): BNP in the last 72 hours.       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

## 2019-04-22 NOTE — PROGRESS NOTES
Marion Andersen is a 76 y.o. female lethargic this morning and rapid response was called.      Current Facility-Administered Medications   Medication Dose Route Frequency Provider Last Rate Last Dose    albuterol sulfate  (90 Base) MCG/ACT inhaler 2 puff  2 puff Inhalation 4x Daily PRN Wily Vázquez MD        amLODIPine (NORVASC) tablet 10 mg  10 mg Oral Nightly Wily Vázquez MD   10 mg at 04/21/19 2133    baclofen (LIORESAL) tablet 20 mg  20 mg Oral TID Wily Vázquez MD   20 mg at 04/21/19 2133    cloNIDine (CATAPRES) tablet 0.3 mg  0.3 mg Oral TID Wily Vázquez MD   0.3 mg at 04/21/19 0854    clopidogrel (PLAVIX) tablet 75 mg  75 mg Oral Nightly Wily Vázquez MD   75 mg at 04/21/19 2135    donepezil (ARICEPT) tablet 10 mg  10 mg Oral Nightly Wily Vázquez MD   10 mg at 04/21/19 2133    gabapentin (NEURONTIN) capsule 300 mg  300 mg Oral TID Wily Vázquez MD   300 mg at 04/21/19 2133    LORazepam (ATIVAN) tablet 0.5 mg  0.5 mg Oral BID Wily Vázquez MD   0.5 mg at 04/21/19 2133    nitroGLYCERIN (NITROSTAT) SL tablet 0.4 mg  0.4 mg Sublingual Q5 Min PRN Wily Vázquez MD        PARoxetine (PAXIL) tablet 20 mg  20 mg Oral QAM Wily Vázquez MD   20 mg at 04/21/19 0855    potassium chloride (KLOR-CON) extended release tablet 10 mEq  10 mEq Oral Daily Wily Vázquez MD   10 mEq at 04/21/19 0855    QUEtiapine (SEROQUEL) tablet 100 mg  100 mg Oral Nightly Wily Vázquez MD   100 mg at 04/21/19 2133    valsartan (DIOVAN) tablet 320 mg  320 mg Oral Daily Wily Vázquez MD   320 mg at 04/21/19 0854    zolpidem (AMBIEN) tablet 10 mg  10 mg Oral Nightly PRN Wily Vázquez MD   10 mg at 04/21/19 2133    sodium chloride flush 0.9 % injection 10 mL  10 mL Intravenous 2 times per day Wily Vázquez MD        sodium chloride flush 0.9 % injection 10 mL  10 mL Intravenous PRN Wily Vázquez MD        magnesium hydroxide (MILK OF MAGNESIA) 400 MG/5ML suspension 30 mL  30 mL Oral Daily PRN Wily Vázquez MD        ondansetron (ZOFRAN) injection 4 mg  4 mg Intravenous Q6H PRN Mary Carmen Concepcion MD   4 mg at 04/21/19 0354    acetaminophen (TYLENOL) tablet 650 mg  650 mg Oral Q4H PRN Mary Carmen Concepcion MD        dicyclomine (BENTYL) tablet 20 mg  20 mg Oral 4x Daily AC & HS Mary Carmen Concepcion MD   20 mg at 04/22/19 0620    aluminum & magnesium hydroxide-simethicone (MAALOX) 200-200-20 MG/5ML suspension 30 mL  30 mL Oral Q6H PRN Mary Carmen Concepcion MD        morphine sulfate (PF) injection 2 mg  2 mg Intravenous Q2H PRN Mary Carmen Concepcion MD   2 mg at 04/21/19 2131    oxyCODONE-acetaminophen (PERCOCET) 5-325 MG per tablet 1 tablet  1 tablet Oral Q4H PRN Mary Carmen Concepcion MD        Or    oxyCODONE-acetaminophen (PERCOCET) 5-325 MG per tablet 2 tablet  2 tablet Oral Q4H PRN Mary Carmen Concepcion MD   2 tablet at 04/21/19 0355    0.9 % sodium chloride infusion   Intravenous Continuous Mary Carmen Concepcion  mL/hr at 04/22/19 0620      insulin lispro (HUMALOG) injection vial 0-6 Units  0-6 Units Subcutaneous TID WC Mary Carmen Concepcion MD        insulin lispro (HUMALOG) injection vial 0-3 Units  0-3 Units Subcutaneous Nightly Yesi Jones MD        glucose (GLUTOSE) 40 % oral gel 15 g  15 g Oral PRN Mary Carmen Concepcion MD        dextrose 50 % solution 12.5 g  12.5 g Intravenous PRN Mary Carmen Concepcion MD        glucagon (rDNA) injection 1 mg  1 mg Intramuscular PRN Mary Carmen Concepcion MD        dextrose 5 % solution  100 mL/hr Intravenous PRN Mary Carmen Concepcion MD        warfarin (COUMADIN) tablet 3 mg  3 mg Oral Daily Mary Carmen Concepcion MD   3 mg at 04/21/19 1846    pantoprazole (PROTONIX) tablet 40 mg  40 mg Oral Daily Maynor Camacho MD   40 mg at 04/22/19 0620     Allergies   Allergen Reactions    Tetracyclines & Related Other (See Comments)     hallucinations    Bactrim [Sulfamethoxazole-Trimethoprim] Diarrhea    Codeine Hives     Active Problems:    Abdominal pain  Resolved Problems:    * No resolved hospital problems. *    Blood pressure 136/62, pulse 84, temperature 98.4 °F (36.9 °C), temperature source Oral, resp.  rate 16, height 5' 7\" (1.702 m), weight 202 lb (91.6 kg), SpO2 91 %, not currently breastfeeding. Subjective:  Symptoms:  Worsening. Objective:  General Appearance:  Comfortable. Vital signs: (most recent): Blood pressure (!) 177/66, pulse 73, temperature 97.8 °F (36.6 °C), temperature source Oral, resp. rate 14, height 5' 7\" (1.702 m), weight 211 lb 10.3 oz (96 kg), SpO2 97 %, not currently breastfeeding. Vital signs are normal.  (Hypertensive). HEENT: Normal HEENT exam.  (Dilated pupils)    Lungs:  Normal effort. Heart: Normal rate. Abdomen: Abdomen is soft. Extremities: Decreased range of motion. Neurological: (Lethargic but responded to flumazenil and narcan). Pupils:  Pupils are equal, round, and reactive to light. Skin:  Warm. Assessment & Plan   Toxic encephalopathy  -had morphine, ativan, seroquel and neurotin last night and had response to romazicon and narcan  -no ambien and took off  -CT head, CXR, ammonia and ABG ordered for rapid response  -transfer to step down  -will place on rocephin and check urine cx as has harrison and high risk for UTI  -stop ativan, morphine and seroquel  Abd pain with( diarrhea(resolved)  -CT abd with resolution of pancolitis  -diarrhea had resolved so hold stool cx and cdiff  -leukocytosis decreasing  -has epigastric pain with sticking sensation and consult GI as may need esophageal dilatation  -consult cardio as epigastric could be cardiac  -lipase wnl  Heart block  -cardiology consulted  -CXR  CAD  -plavix  PAF  -INR and coumadin  HTN  -CCB, clonidine, ARB  -Dementia  -aricept  DVT prophylaxis  -coumadin      For disposition have to ensure mental status improves and then wait for GI and card recs. Had heart block last night.    Camille Chairez MD  4/22/2019

## 2019-04-22 NOTE — CONSULTS
Patient was in rapid response with mental status changes. Consult for epigastric pain and dysphagia. Will see patient tomorrow.

## 2019-04-22 NOTE — PROGRESS NOTES
Transfer skin assessment completed by Richard Garay RN, and Sheila. Scattered bruises noted throughout. Scab noted to left ankle and buttocks red.

## 2019-04-23 ENCOUNTER — ANESTHESIA EVENT (OUTPATIENT)
Dept: ENDOSCOPY | Age: 69
DRG: 385 | End: 2019-04-23
Payer: MEDICARE

## 2019-04-23 ENCOUNTER — APPOINTMENT (OUTPATIENT)
Dept: GENERAL RADIOLOGY | Age: 69
DRG: 385 | End: 2019-04-23
Payer: MEDICARE

## 2019-04-23 ENCOUNTER — ANESTHESIA (OUTPATIENT)
Dept: ENDOSCOPY | Age: 69
DRG: 385 | End: 2019-04-23
Payer: MEDICARE

## 2019-04-23 LAB
ALBUMIN SERPL-MCNC: 3.3 GM/DL (ref 3.4–5)
ALP BLD-CCNC: 86 IU/L (ref 40–129)
ALT SERPL-CCNC: 10 U/L (ref 10–40)
AMMONIA: 22 UMOL/L (ref 11–51)
ANION GAP SERPL CALCULATED.3IONS-SCNC: 9 MMOL/L (ref 4–16)
AST SERPL-CCNC: 10 IU/L (ref 15–37)
BILIRUB SERPL-MCNC: 0.2 MG/DL (ref 0–1)
BUN BLDV-MCNC: 13 MG/DL (ref 6–23)
CALCIUM SERPL-MCNC: 8.2 MG/DL (ref 8.3–10.6)
CHLORIDE BLD-SCNC: 107 MMOL/L (ref 99–110)
CO2: 23 MMOL/L (ref 21–32)
CREAT SERPL-MCNC: 1 MG/DL (ref 0.6–1.1)
CULTURE: NORMAL
GFR AFRICAN AMERICAN: >60 ML/MIN/1.73M2
GFR NON-AFRICAN AMERICAN: 55 ML/MIN/1.73M2
GLUCOSE BLD-MCNC: 101 MG/DL (ref 70–99)
GLUCOSE BLD-MCNC: 148 MG/DL (ref 70–99)
GLUCOSE BLD-MCNC: 97 MG/DL (ref 70–99)
HCT VFR BLD CALC: 30.6 % (ref 37–47)
HEMOGLOBIN: 9 GM/DL (ref 12.5–16)
INR BLD: 1.74 INDEX
Lab: NORMAL
MAGNESIUM: 2.1 MG/DL (ref 1.8–2.4)
MCH RBC QN AUTO: 28.7 PG (ref 27–31)
MCHC RBC AUTO-ENTMCNC: 29.4 % (ref 32–36)
MCV RBC AUTO: 97.5 FL (ref 78–100)
PDW BLD-RTO: 15 % (ref 11.7–14.9)
PLATELET # BLD: 183 K/CU MM (ref 140–440)
PMV BLD AUTO: 10.1 FL (ref 7.5–11.1)
POTASSIUM SERPL-SCNC: 4.4 MMOL/L (ref 3.5–5.1)
PROTHROMBIN TIME: 20.1 SECONDS (ref 9.12–12.5)
RBC # BLD: 3.14 M/CU MM (ref 4.2–5.4)
SODIUM BLD-SCNC: 139 MMOL/L (ref 135–145)
SPECIMEN: NORMAL
T4 TOTAL: 6.83 UG/DL (ref 5.1–14.1)
T4 TOTAL: NORMAL UG/DL (ref 5.1–14.1)
TOTAL PROTEIN: 5.2 GM/DL (ref 6.4–8.2)
WBC # BLD: 8.6 K/CU MM (ref 4–10.5)

## 2019-04-23 PROCEDURE — 80053 COMPREHEN METABOLIC PANEL: CPT

## 2019-04-23 PROCEDURE — G0378 HOSPITAL OBSERVATION PER HR: HCPCS

## 2019-04-23 PROCEDURE — 96366 THER/PROPH/DIAG IV INF ADDON: CPT

## 2019-04-23 PROCEDURE — 82140 ASSAY OF AMMONIA: CPT

## 2019-04-23 PROCEDURE — 85610 PROTHROMBIN TIME: CPT

## 2019-04-23 PROCEDURE — 6370000000 HC RX 637 (ALT 250 FOR IP): Performed by: HOSPITALIST

## 2019-04-23 PROCEDURE — 71046 X-RAY EXAM CHEST 2 VIEWS: CPT

## 2019-04-23 PROCEDURE — 83735 ASSAY OF MAGNESIUM: CPT

## 2019-04-23 PROCEDURE — 82962 GLUCOSE BLOOD TEST: CPT

## 2019-04-23 PROCEDURE — 2580000003 HC RX 258: Performed by: HOSPITALIST

## 2019-04-23 PROCEDURE — 85027 COMPLETE CBC AUTOMATED: CPT

## 2019-04-23 PROCEDURE — 6360000002 HC RX W HCPCS: Performed by: HOSPITALIST

## 2019-04-23 PROCEDURE — 94762 N-INVAS EAR/PLS OXIMTRY CONT: CPT

## 2019-04-23 PROCEDURE — 36415 COLL VENOUS BLD VENIPUNCTURE: CPT

## 2019-04-23 PROCEDURE — 94761 N-INVAS EAR/PLS OXIMETRY MLT: CPT

## 2019-04-23 RX ADMIN — PAROXETINE HYDROCHLORIDE 20 MG: 20 TABLET, FILM COATED ORAL at 11:45

## 2019-04-23 RX ADMIN — GABAPENTIN 300 MG: 300 CAPSULE ORAL at 20:42

## 2019-04-23 RX ADMIN — ACETAMINOPHEN 650 MG: 325 TABLET ORAL at 01:13

## 2019-04-23 RX ADMIN — SODIUM CHLORIDE: 9 INJECTION, SOLUTION INTRAVENOUS at 00:54

## 2019-04-23 RX ADMIN — CLOPIDOGREL BISULFATE 75 MG: 75 TABLET ORAL at 20:43

## 2019-04-23 RX ADMIN — WARFARIN SODIUM 4 MG: 4 TABLET ORAL at 17:50

## 2019-04-23 RX ADMIN — DICYCLOMINE HYDROCHLORIDE 20 MG: 20 TABLET ORAL at 17:53

## 2019-04-23 RX ADMIN — BACLOFEN 20 MG: 10 TABLET ORAL at 17:50

## 2019-04-23 RX ADMIN — BACLOFEN 20 MG: 10 TABLET ORAL at 11:44

## 2019-04-23 RX ADMIN — GABAPENTIN 300 MG: 300 CAPSULE ORAL at 17:50

## 2019-04-23 RX ADMIN — CLONIDINE HYDROCHLORIDE 0.3 MG: 0.2 TABLET ORAL at 11:44

## 2019-04-23 RX ADMIN — ACETAMINOPHEN 650 MG: 325 TABLET ORAL at 22:31

## 2019-04-23 RX ADMIN — SODIUM CHLORIDE, PRESERVATIVE FREE 10 ML: 5 INJECTION INTRAVENOUS at 20:41

## 2019-04-23 RX ADMIN — DICYCLOMINE HYDROCHLORIDE 20 MG: 20 TABLET ORAL at 11:45

## 2019-04-23 RX ADMIN — DICYCLOMINE HYDROCHLORIDE 20 MG: 20 TABLET ORAL at 20:42

## 2019-04-23 RX ADMIN — AMLODIPINE BESYLATE 10 MG: 10 TABLET ORAL at 20:42

## 2019-04-23 RX ADMIN — GABAPENTIN 300 MG: 300 CAPSULE ORAL at 11:45

## 2019-04-23 RX ADMIN — BACLOFEN 20 MG: 10 TABLET ORAL at 22:31

## 2019-04-23 RX ADMIN — ACETAMINOPHEN 650 MG: 325 TABLET ORAL at 05:46

## 2019-04-23 RX ADMIN — POTASSIUM CHLORIDE 10 MEQ: 750 TABLET, EXTENDED RELEASE ORAL at 11:44

## 2019-04-23 RX ADMIN — VALSARTAN 320 MG: 160 TABLET, FILM COATED ORAL at 11:44

## 2019-04-23 RX ADMIN — CEFTRIAXONE 1 G: 1 INJECTION, POWDER, FOR SOLUTION INTRAMUSCULAR; INTRAVENOUS at 14:27

## 2019-04-23 RX ADMIN — DONEPEZIL HYDROCHLORIDE 10 MG: 10 TABLET, FILM COATED ORAL at 20:42

## 2019-04-23 ASSESSMENT — PAIN DESCRIPTION - ORIENTATION: ORIENTATION: LEFT;LOWER

## 2019-04-23 ASSESSMENT — PAIN SCALES - GENERAL
PAINLEVEL_OUTOF10: 0
PAINLEVEL_OUTOF10: 6
PAINLEVEL_OUTOF10: 0
PAINLEVEL_OUTOF10: 4

## 2019-04-23 ASSESSMENT — PAIN DESCRIPTION - PAIN TYPE: TYPE: ACUTE PAIN

## 2019-04-23 ASSESSMENT — PAIN DESCRIPTION - FREQUENCY: FREQUENCY: INTERMITTENT

## 2019-04-23 ASSESSMENT — PAIN DESCRIPTION - LOCATION: LOCATION: HEAD;SHOULDER

## 2019-04-23 ASSESSMENT — PAIN DESCRIPTION - DESCRIPTORS: DESCRIPTORS: DISCOMFORT

## 2019-04-23 NOTE — DISCHARGE SUMMARY
tablet  1 tab by mouth once daily             zolpidem (AMBIEN) 10 MG tablet  Take by mouth nightly as needed for Sleep. Objective Findings at Discharge:   BP (!) 120/50   Pulse 58   Temp 97.1 °F (36.2 °C) (Oral)   Resp 10   Ht 5' 7\" (1.702 m)   Wt 211 lb 12.8 oz (96.1 kg)   SpO2 96%   BMI 33.17 kg/m²            PHYSICAL EXAM   GEN Awake female, sitting upright in bed in no apparent distress. Appears given age. EYES Pupils are equally round. No scleral erythema, discharge, or conjunctivitis. HENT Mucous membranes are moist. Oral pharynx without exudates, no evidence of thrush. NECK Supple, no apparent thyromegaly or masses. RESP Clear to auscultation, no wheezes, rales or rhonchi. Symmetric chest movement while on room air. CARDIO/VASC S1/S2 auscultated. Regular rate without appreciable murmurs, rubs, or gallops. No JVD or carotid bruits. Peripheral pulses equal bilaterally and palpable. No peripheral edema. GI Abdomen is soft without significant tenderness, masses, or guarding. Bowel sounds are normoactive. Rectal exam deferred. HEME/LYMPH No palpable cervical lymphadenopathy and no hepatosplenomegaly. No petechiae or ecchymoses. MSK No gross joint deformities. Mild bilateral pedal edema  SKIN Normal coloration, warm, dry. NEURO Cranial nerves appear grossly intact, normal speech, no lateralizing weakness. PSYCH Awake, alert, oriented x 4. Affect appropriate.     BMP/CBC  Recent Labs     04/20/19  1835 04/21/19  0414 04/22/19  0550 04/23/19  0414     --   --  139   K 4.3  --   --  4.4   CL 98*  --   --  107   CO2 24  --   --  23   BUN 15  --   --  13   CREATININE 0.9  --   --  1.0   WBC 19.4* 17.9* 13.6* 8.6   HCT 38.9 37.2 33.7* 30.6*    321 208 183         Discharge Time of 35 minutes    Electronically signed by Romayne Hickory, MD on 4/23/2019 at 12:28 PM

## 2019-04-23 NOTE — PROGRESS NOTES
4/23/2019  0620  Pt's Apnea Link Completed. The results are as follows:   AHI:   22.2           RI:   22.5  Pt resting in bed.

## 2019-04-23 NOTE — PROGRESS NOTES
Cardiology Progress Note       Marion Andersen is a 76 y.o. female   1950     SUBJECTIVE:   Patient  Reportedly  Had  Complete  Heart  Block  While  Hypoxic/  Oversedation  Given  Narcan  And  Returned  Back  To  Sinus  holter  Nor    Read  Yet      Rhythm  Remains  Normal  Sinus         . Patient  Denies  Chest  Pain  Or  Sob    OBJECTIVE:    Review of Systems:  General appearance: alert, appears stated age and cooperative  Skin: Skin color, texture, normal. No rashes or lesions  HEENT: No nose bleed, headache, vision problems  CV: C/O chest pain, tightness, pressure,   Respiratory: C/o no SOB, THOMAS, Orthopnea, PND  GI: No abdominal pain, black stool, bloating  Limbs: No c/o edema, pain, swelling, intermittent claudication, joint pains  Neuro: No dizziness, lightheadedness, syncope, gait problems, memory problems  Psych: grossly normal. No SI/depression. Vitals:   Blood pressure (!) 120/50, pulse 58, temperature 97.1 °F (36.2 °C), temperature source Oral, resp. rate 10, height 5' 7\" (1.702 m), weight 211 lb 12.8 oz (96.1 kg), SpO2 96 %, not currently breastfeeding.     HEENT: AT, NC, PERRLA  Neck: No JVD  Heart: S1 S2 audible, no murmur   Lungs: CTA   Abdomen: Nontender   Limbs: No edema   CNS: no focal deficit      Past Medical History:   Diagnosis Date    A-fib [facility])     ACS (acute coronary syndrome) ([address] 6/16/2014    TRINO (acute kidney injury) ([address] 5/21/2017    Anxiety disorder     Arthritis     Atrial fibrillation ([address]     Blood transfusion     CAD (coronary artery disease)     \"have 6 heart stents- follows with Dr Chadd Ochoa Cerebral artery occlusion with cerebral infarction ([address]     CHF (congestive heart failure) ([address]     pt. denies this dx    Coronary syndrome, acute ([address]     lesion of lad    Depression     Diabetes mellitus ([address]     \"dx 2 yr ago\"    Dysphagia     pt is unsure about this dx with phone assessment 3/6/2017    Fall     \"fell first week of DIANE(5010)- reaching glass of water and fell out of bed\"\"have some bruising(on Plavix and Coumadin\" but did not hurt myself\"    Hyperlipidemia     Hypertension     Lumbar disc herniation     following with Dr Sujata Sanderson- they have to remove the reveal monitor to get a MRI of the back\"    Nausea & vomiting     Parkinson's disease [facility]     Reflux         Patient Active Problem List   Diagnosis    Abnormal nuclear cardiac imaging test    ASHD (arteriosclerotic heart disease)    AF (atrial fibrillation) [facility]    Obesity (BMI 30.0-34. 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[facility]    General weakness    Multiple falls    Chronic left-sided low back pain with left-sided sciatica    Neuropathy    Pedal edema    Encephalopathy    Hypokalemia    Dyspnea    Angina pectoris (HCC)    Diuretic-induced hypokalemia    Abdominal pain        Allergies   Allergen Reactions    Tetracyclines & Related Other (See Comments)     hallucinations    Bactrim [Sulfamethoxazole-Trimethoprim] Diarrhea    Codeine Hives        Current Inpatient Medications:    Current Facility-Administered Medications   Medication Dose Route Frequency Provider Last Rate Last Dose    naloxone (NARCAN) injection 0.4 mg  0.4 mg Intravenous PRN Lori Reynaga MD   0.4 mg at 04/22/19 0848    cefTRIAXone (ROCEPHIN) 1 g in dextrose 5 % 50 mL IVPB  1 g Intravenous Q24H Lori Reynaga MD   Stopped at 04/22/19 1514    warfarin (COUMADIN) tablet 4 mg  4 mg Oral Daily Yesi Jones MD   4 mg at 04/22/19 1833    lactulose (CHRONULAC) 10 GM/15ML solution 20 g  20 g Oral BID Lori Reynaga MD   20 g at 04/22/19 1704    albuterol sulfate  (90 Base) MCG/ACT inhaler 2 puff  2 puff Inhalation 4x Daily PRN Reema Denton MD        amLODIPine (NORVASC) tablet 10 mg  10 mg Oral Nightly Reema Denton MD   10 mg at 04/22/19 2155    baclofen (LIORESAL) tablet 20 mg  20 mg Oral TID Mazin Treviño MD   20 mg at 04/22/19 2154    cloNIDine (CATAPRES) tablet 0.3 mg  0.3 mg Oral TID Mazin Treviño MD   0.3 mg at 04/22/19 2155    clopidogrel (PLAVIX) tablet 75 mg  75 mg Oral Nightly Mazin Treviño MD   75 mg at 04/21/19 2135    donepezil (ARICEPT) tablet 10 mg  10 mg Oral Nightly Mazin Treviño MD   10 mg at 04/22/19 2156    gabapentin (NEURONTIN) capsule 300 mg  300 mg Oral TID Mazin Treviño MD   300 mg at 04/22/19 2155    nitroGLYCERIN (NITROSTAT) SL tablet 0.4 mg  0.4 mg Sublingual Q5 Min PRN Mazin Treviño MD        PARoxetine (PAXIL) tablet 20 mg  20 mg Oral QAM Mazin Treviño MD   Stopped at 04/22/19 0938    potassium chloride (KLOR-CON) extended release tablet 10 mEq  10 mEq Oral Daily Mazin Treviño MD   Stopped at 04/22/19 0938    valsartan (DIOVAN) tablet 320 mg  320 mg Oral Daily Mazin Treviño MD   Stopped at 04/22/19 5887    sodium chloride flush 0.9 % injection 10 mL  10 mL Intravenous 2 times per day Mazin Treviño MD        sodium chloride flush 0.9 % injection 10 mL  10 mL Intravenous PRN Mazin Treviño MD        magnesium hydroxide (MILK OF MAGNESIA) 400 MG/5ML suspension 30 mL  30 mL Oral Daily PRN Mazin Treviño MD        ondansetron (ZOFRAN) injection 4 mg  4 mg Intravenous Q6H PRN Mazin Treviño MD   4 mg at 04/21/19 0354    acetaminophen (TYLENOL) tablet 650 mg  650 mg Oral Q4H PRN Mazin Treviño MD   650 mg at 04/23/19 0546    dicyclomine (BENTYL) tablet 20 mg  20 mg Oral 4x Daily AC & HS Mazin Treviño MD   Stopped at 04/23/19 0700    aluminum & magnesium hydroxide-simethicone (MAALOX) 200-200-20 MG/5ML suspension 30 mL  30 mL Oral Q6H PRN Mazin Treviño MD        0.9 % sodium chloride infusion   Intravenous Continuous Ragia Robert,  mL/hr at 04/23/19 0054      insulin lispro (HUMALOG) injection vial 0-6 Units  0-6 Units Subcutaneous TID  Mazin Treviño MD   Stopped at 04/22/19 0829    insulin lispro (HUMALOG) injection vial 0-3 Units  0-3 Units Subcutaneous Nightly Yesi Jones MD        glucose (GLUTOSE) 40 % oral gel 15 g  15 g Oral PRN Kyung Carr MD        dextrose 50 % solution 12.5 g  12.5 g Intravenous PRN Kyung Carr MD        glucagon (rDNA) injection 1 mg  1 mg Intramuscular PRN Kyung Carr MD        dextrose 5 % solution  100 mL/hr Intravenous PRN Kyung Carr MD        pantoprazole (PROTONIX) tablet 40 mg  40 mg Oral Daily Tam Hernandez MD   Stopped at 04/23/19 0700           Labs:  CBC with Differential:    Lab Results   Component Value Date    WBC 8.6 04/23/2019    RBC 3.14 04/23/2019    HGB 9.0 04/23/2019    HCT 30.6 04/23/2019     04/23/2019    MCV 97.5 04/23/2019    MCH 28.7 04/23/2019    MCHC 29.4 04/23/2019    RDW 15.0 04/23/2019    SEGSPCT 78.0 04/20/2019    BANDSPCT 2 04/20/2019    LYMPHOPCT 16.0 04/20/2019    MONOPCT 1.0 04/20/2019    EOSPCT 1.9 03/29/2019    BASOPCT 1.0 04/20/2019    MONOSABS 0.2 04/20/2019    LYMPHSABS 3.1 04/20/2019    EOSABS 0.4 04/20/2019    BASOSABS 0.2 04/20/2019    DIFFTYPE MANUAL DIFFERENTIAL 04/20/2019     CMP:    Lab Results   Component Value Date     04/23/2019    K 4.4 04/23/2019     04/23/2019    CO2 23 04/23/2019    BUN 13 04/23/2019    CREATININE 1.0 04/23/2019    GFRAA >60 04/23/2019    LABGLOM 55 04/23/2019    GLUCOSE 101 04/23/2019    PROT 5.2 04/23/2019    LABALBU 3.3 04/23/2019    CALCIUM 8.2 04/23/2019    BILITOT 0.2 04/23/2019    ALKPHOS 86 04/23/2019    AST 10 04/23/2019    ALT 10 04/23/2019     Hepatic Function Panel:    Lab Results   Component Value Date    ALKPHOS 86 04/23/2019    ALT 10 04/23/2019    AST 10 04/23/2019    PROT 5.2 04/23/2019    BILITOT 0.2 04/23/2019    BILIDIR 0.2 06/11/2018    IBILI 0.1 06/11/2018    LABALBU 3.3 04/23/2019     Magnesium:    Lab Results   Component Value Date    MG 2.1 04/23/2019     PT/INR:    Lab Results   Component Value Date    PROTIME 20.1 04/23/2019    INR 1.74 04/23/2019     Last 3 Troponin:  No results found for: TROPONINI  U/A:    Lab Results   Component Value Date    COLORU STRAW 04/20/2019 WBCUA <1 04/20/2019    RBCUA <1 04/20/2019    MUCUS RARE 03/20/2019    TRICHOMONAS NONE SEEN 04/20/2019    YEAST RARE 07/13/2018    BACTERIA NEGATIVE 04/20/2019    CLARITYU CLEAR 04/20/2019    SPECGRAV >1.060 04/20/2019    SPECGRAV  04/20/2019     (NOTE)  CONSIDER URINE OSMOLARITY TEST IF CLINICALLY INDICATED        LEUKOCYTESUR NEGATIVE 04/20/2019    UROBILINOGEN NORMAL 04/20/2019    BILIRUBINUR NEGATIVE 04/20/2019    BLOODU NEGATIVE 04/20/2019     ABG:    Lab Results   Component Value Date    RFY4IUT 28.0 04/22/2019    PO2ART 40 04/22/2019    XRH0RXC 20.4 04/22/2019     FLP:    Lab Results   Component Value Date    TRIG 364 07/14/2018    HDL 46 07/14/2018    LDLDIRECT 93 07/14/2018     TSH:  No results found for: TSH   DATA:   ECG: Sinus Rhythm       ASSESSMENT:   1  Bradycardia    Episode  Of  Complete  Heart  Block?   No  Documented    Strip   Now  Remain  In  Sinus  Rhythm  Echo  Norm,al  lvf  2  Cad  pci  In  Past    Chest  painfree  3  Hypertension  Well  Controlled  4  PAF   remainjs  In  Sinus  Rhythm  5  High  Cholesterol  6  obesity  PLAN    stable  From  Cardiac  Standpoint  Will  Review  holter  When  completed

## 2019-04-23 NOTE — CONSULTS
[facility] Gastroenterology  Gastroenterology Consultation    2019  8:30 AM    Patient:    Gilda Reaves  : 1950   76 y.o. MRN: [id number]  Admitted: 2019  6:02 PM [facility]   3103/3103-A  AdmitDx: Abdominal pain, epigastric [R10.13]  Nausea [R11.0]  Abdominal pain [R10.9]  Leukocytosis, unspecified type [D72.829]  Chest pain, unspecified type [R07.9]  PCP: Chikis Gonzales MD    Reason for Consult:  Sticking sensation epigastric with reflex. Concern for stricture    Requesting Physician:  [facility] LATASHA [facility] MD KAYLA      History Obtained From:  Patient and review of all records    HISTORY OF PRESENT ILLNESS:                The patient is a 76 y.o. female with significant past medical history as below who presents with above mentioned causes in reason for consult. Has had dysphagia for 2 months. No choking on food. Sits up in chair with relief. Vomites a few times with relief. Relief with water and stretching neck. Heartburn daily. Takes rolaids after each meal. Also takes pantoprazole started 1 month ago w/o relief. Recently on amoxicillin. No inhalers. Rapid response yesterday, responded to narcan. Denies Abdominal pain or epigastric pain at this time.    Last BM yesterday am.     History of EGD  gastritis   History of colonoscopy  polypectomy, diverticulosis    Denies NSAIDs   Anti-platelet therapy Coumadin, last dose 19, Plavix last dose     nonSmoker  Denies Alcohol intake    Past Medical History:        Diagnosis Date    A-fib [facility])     ACS (acute coronary syndrome) ([facility] 2014    TRINO (acute kidney injury) ([facility] 2017    Anxiety disorder     Arthritis     Atrial fibrillation ([facility]     Blood transfusion     CAD (coronary artery disease)     \"have 6 heart stents- follows with Dr Vince Hamilton Cerebral artery occlusion with cerebral infarction ([facility]     CHF (congestive heart failure) ([facility]     pt. denies this dx    Coronary syndrome, acute dextrose      Allergies:  Tetracyclines & related; Bactrim [sulfamethoxazole-trimethoprim]; and Codeine    Social History:   TOBACCO:   reports that she has never smoked. She has never used smokeless tobacco.  ETOH:   reports that she does not drink alcohol. Family History:       Problem Relation Age of Onset    Heart Disease Mother     High Blood Pressure Mother     Heart Disease Father        No family history of colon cancer, Crohn's disease, or ulcerative colitis. REVIEW OF SYSTEMS:    The positive ROS will be identified in bold, otherwise ROS are negative     CONSTITUTIONAL:  Neg   Recent weight changes, fatigue, fever, chills or night sweats  RESPIRATORY:   Neg SOB, wheeze, cough, sputum, hemoptysis or bronchitis  CARDIOVASCULAR:  Neg chest pain, palpitations, dyspnea on exertion, orthopnea, paroxysmal nocturnal dyspnea or edema  GASTROINTESTINAL:  SEE HPI  HEMATOLOGIC/LYMPHATIC:  Neg  Anemia, bleeding tendency  NEUROLOGICAL:  Neg  Loss of Consciousness, memory loss, forgetfulness, periods of confusion, difficulty concentrating, seizures, decline in intellect, nervousness, insomina, aphasia or dysarthria. SKIN:  Neg  skin or hair changes, and has no itching, rashes, or sores.       PHYSICAL EXAM:      Vitals:    BP (!) 120/50   Pulse 58   Temp 97.1 °F (36.2 °C) (Oral)   Resp 10   Ht 5' 7\" (1.702 m)   Wt 211 lb 12.8 oz (96.1 kg)   SpO2 96%   BMI 33.17 kg/m²     General Appearance:    Alert, cooperative, no distress, appears stated age   HEENT:    Normocephalic, atraumatic, Conjunctiva clear   Neck:   Supple, symmetrical, trachea midline   Lungs:     Clear to auscultation bilaterally, respirations unlabored   Chest Wall:    No tenderness or deformity    Heart:    Regular rate and rhythm, S1 and S2 normal   Abdomen:     Soft, non-tender, bowel sounds active all four quadrants,     no masses, no organomegaly, no ascites    Rectal:    Deferred   Extremities:   Extremities normal, atraumatic, no cyanosis or edema   Pulses:   2+ and symmetric all extremities   Skin:   Skin color, texture, turgor normal, no rashes or lesions   Lymph nodes:   No abnormality   Neurologic:   No focal deficits, moving all four extremities            DATA:    ABGs:   Lab Results   Component Value Date    PO2ART 40 04/22/2019    FWY1TEL 28.0 04/22/2019     CBC:   Recent Labs     04/20/19  1835 04/21/19  0414 04/22/19  0550   WBC 19.4* 17.9* 13.6*   HGB 12.0* 11.2* 9.7*    321 208     BMP:    Recent Labs     04/20/19  1835 04/23/19  0414    139   K 4.3 4.4   CL 98* 107   CO2 24 23   BUN 15 13   CREATININE 0.9 1.0   GLUCOSE 134* 101*     Magnesium:   Lab Results   Component Value Date    MG 2.1 04/23/2019     Hepatic:   Recent Labs     04/20/19  1835 04/23/19  0414   AST 18 10*   ALT 15 10   BILITOT 0.3 0.2   ALKPHOS 136* 86     Recent Labs     04/20/19  1835   LIPASE 31     Recent Labs     04/21/19  0414 04/22/19  0550 04/23/19  0414   PROTIME 19.3* 17.8* 20.1*   INR 1.70 1.54 1.74     No results for input(s): PTT in the last 72 hours. Lipids: No results for input(s): CHOL, HDL in the last 72 hours. Invalid input(s): LDLCALCU  INR:   Recent Labs     04/21/19  0414 04/22/19  0550 04/23/19  0414   INR 1.70 1.54 1.74     TSH: No results found for: TSH    Intake/Output Summary (Last 24 hours) at 4/23/2019 0830  Last data filed at 4/23/2019 0730  Gross per 24 hour   Intake 490 ml   Output --   Net 490 ml      sodium chloride 100 mL/hr at 04/23/19 0054    dextrose         Imaging Studies: Reviewed      IMPRESSION:      Patient Active Problem List   Diagnosis Code    Abnormal nuclear cardiac imaging test R93.1    ASHD (arteriosclerotic heart disease) I25.10    AF [facility]at[facility] fibrillation) ([facility] I48.91    Obesity (BMI 30.0-34. 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 ([facility] D68.32, T45.515A    General weakness R53.1    Multiple falls R29.6    Chronic left-sided low back pain with left-sided sciatica M54.42, G89.29    Neuropathy G62.9    Pedal edema R60.0    Encephalopathy G93.40    Hypokalemia E87.6    Dyspnea R06.00    Angina pectoris (HCC) I20.9    Diuretic-induced hypokalemia E87.6, T50.2X5A    Abdominal pain R10.9     Assessment:  Dysphagia- longstanding  Most likely related to uncontrolled GERD  May have esophagitis and/or stricture, although no choking  On Coumadin and Plavix  INR 1.74, last dose 4/21/19    RECOMMENDATIONS:  Take small bites  Chew well  Advance diet to soft  Drink water between bites  EGD OP at this time, will need to be off Plavix and Coumadin 5 days prior for possible dilation     Can be discharged OP from GI standpoint. Discussed plan of care with patient     Patient clinical, biochemical, and radiological information discussed with Dr. Della Benavides. He agrees with the assessment and plan. Murphy Maciel CNP  4/23/2019  8:30 AM     Pt seen 4/23/19. Will schedule her for outpatient EGD and colonoscopy. Continue daily PPI on discharge. Ok to discharge from GI standpoint. I have seen and examined the patient myself. I have reviewed the hospital care given to date and reviewed all pertinent labs and imaging. The plan was developed mutually at the time of visit with the patient, Murphy Maciel CNP and myself. I have spoken with the patient, nursing staff and provided written and verbal instructions. The above note has been reviewed and I agree with the assessment, diagnosis, and treatment plan with as suggested by Murphy Maciel CNP with changes made by me as above.     [facility]

## 2019-04-24 PROBLEM — I44.1 AV BLOCK, 2ND DEGREE: Status: ACTIVE | Noted: 2019-04-24

## 2019-04-24 LAB
GLUCOSE BLD-MCNC: 102 MG/DL (ref 70–99)
GLUCOSE BLD-MCNC: 126 MG/DL (ref 70–99)
GLUCOSE BLD-MCNC: 92 MG/DL (ref 70–99)
GLUCOSE BLD-MCNC: 94 MG/DL (ref 70–99)
INR BLD: 1.65 INDEX
PROTHROMBIN TIME: 19 SECONDS (ref 9.12–12.5)

## 2019-04-24 PROCEDURE — 2140000000 HC CCU INTERMEDIATE R&B

## 2019-04-24 PROCEDURE — 36415 COLL VENOUS BLD VENIPUNCTURE: CPT

## 2019-04-24 PROCEDURE — 85610 PROTHROMBIN TIME: CPT

## 2019-04-24 PROCEDURE — 82962 GLUCOSE BLOOD TEST: CPT

## 2019-04-24 PROCEDURE — 94761 N-INVAS EAR/PLS OXIMETRY MLT: CPT

## 2019-04-24 PROCEDURE — 2580000003 HC RX 258: Performed by: HOSPITALIST

## 2019-04-24 PROCEDURE — 96366 THER/PROPH/DIAG IV INF ADDON: CPT

## 2019-04-24 PROCEDURE — G0378 HOSPITAL OBSERVATION PER HR: HCPCS

## 2019-04-24 PROCEDURE — 6360000002 HC RX W HCPCS: Performed by: HOSPITALIST

## 2019-04-24 PROCEDURE — 6370000000 HC RX 637 (ALT 250 FOR IP): Performed by: HOSPITALIST

## 2019-04-24 RX ORDER — WARFARIN SODIUM 5 MG/1
5 TABLET ORAL DAILY
Status: DISCONTINUED | OUTPATIENT
Start: 2019-04-24 | End: 2019-04-25 | Stop reason: HOSPADM

## 2019-04-24 RX ADMIN — SODIUM CHLORIDE, PRESERVATIVE FREE 10 ML: 5 INJECTION INTRAVENOUS at 21:00

## 2019-04-24 RX ADMIN — DICYCLOMINE HYDROCHLORIDE 20 MG: 20 TABLET ORAL at 10:29

## 2019-04-24 RX ADMIN — GABAPENTIN 300 MG: 300 CAPSULE ORAL at 08:17

## 2019-04-24 RX ADMIN — PAROXETINE HYDROCHLORIDE 20 MG: 20 TABLET, FILM COATED ORAL at 08:17

## 2019-04-24 RX ADMIN — ACETAMINOPHEN 650 MG: 325 TABLET ORAL at 08:17

## 2019-04-24 RX ADMIN — CLOPIDOGREL BISULFATE 75 MG: 75 TABLET ORAL at 21:32

## 2019-04-24 RX ADMIN — VALSARTAN 320 MG: 160 TABLET, FILM COATED ORAL at 08:17

## 2019-04-24 RX ADMIN — POTASSIUM CHLORIDE 10 MEQ: 750 TABLET, EXTENDED RELEASE ORAL at 10:30

## 2019-04-24 RX ADMIN — CLONIDINE HYDROCHLORIDE 0.3 MG: 0.2 TABLET ORAL at 08:17

## 2019-04-24 RX ADMIN — CLONIDINE HYDROCHLORIDE 0.3 MG: 0.2 TABLET ORAL at 21:33

## 2019-04-24 RX ADMIN — BACLOFEN 20 MG: 10 TABLET ORAL at 08:17

## 2019-04-24 RX ADMIN — DICYCLOMINE HYDROCHLORIDE 20 MG: 20 TABLET ORAL at 17:34

## 2019-04-24 RX ADMIN — AMLODIPINE BESYLATE 10 MG: 10 TABLET ORAL at 21:32

## 2019-04-24 RX ADMIN — WARFARIN SODIUM 5 MG: 5 TABLET ORAL at 17:34

## 2019-04-24 RX ADMIN — BACLOFEN 20 MG: 10 TABLET ORAL at 21:32

## 2019-04-24 RX ADMIN — PANTOPRAZOLE SODIUM 40 MG: 40 TABLET, DELAYED RELEASE ORAL at 06:45

## 2019-04-24 RX ADMIN — ACETAMINOPHEN 650 MG: 325 TABLET ORAL at 04:08

## 2019-04-24 RX ADMIN — GABAPENTIN 300 MG: 300 CAPSULE ORAL at 15:04

## 2019-04-24 RX ADMIN — DONEPEZIL HYDROCHLORIDE 10 MG: 10 TABLET, FILM COATED ORAL at 21:33

## 2019-04-24 RX ADMIN — SODIUM CHLORIDE, PRESERVATIVE FREE 10 ML: 5 INJECTION INTRAVENOUS at 10:29

## 2019-04-24 RX ADMIN — CEFTRIAXONE 1 G: 1 INJECTION, POWDER, FOR SOLUTION INTRAMUSCULAR; INTRAVENOUS at 11:35

## 2019-04-24 RX ADMIN — DICYCLOMINE HYDROCHLORIDE 20 MG: 20 TABLET ORAL at 21:32

## 2019-04-24 RX ADMIN — BACLOFEN 20 MG: 10 TABLET ORAL at 15:04

## 2019-04-24 RX ADMIN — DICYCLOMINE HYDROCHLORIDE 20 MG: 20 TABLET ORAL at 06:45

## 2019-04-24 RX ADMIN — GABAPENTIN 300 MG: 300 CAPSULE ORAL at 21:33

## 2019-04-24 ASSESSMENT — PAIN DESCRIPTION - LOCATION: LOCATION: BACK

## 2019-04-24 ASSESSMENT — PAIN SCALES - GENERAL
PAINLEVEL_OUTOF10: 4
PAINLEVEL_OUTOF10: 0

## 2019-04-24 ASSESSMENT — PAIN DESCRIPTION - PAIN TYPE: TYPE: CHRONIC PAIN

## 2019-04-24 NOTE — DISCHARGE SUMMARY
Discharge Summary    Name:  Loreta Kessler /Age/Sex: 1950  (76 y.o. female)   MRN & CSN:  [id number] & [id number] Admission Date/Time: 2019  6:02 PM   Attending:  Shawn Bella MD Discharging Physician: Shawn Bella MD     Hospital Course:     Discharged diagnosis    Metabolic encephalopathy  Acute diarrhea  Paroxysmal atrial fibrillation on Coumadin  Hypertension  Dementia    69-year-old female with a past medical history significant for CAD status post PCI, DM type II, hypertension presents to the ED with diarrhea which had been ongoing for about 3 weeks and not responsive to Augmentin which she was receiving for suspected  Colitis. She also complained of chest pain and shortness of breath on presentation,  Of note she had a cardiac catheterization done on 19 which showed moderate CAD with some in-stent restenosis. There was a concern for urinary tract infection for which she was started on ceftriaxone. Patient however overnight developed a ventricular pauses which was associated with her episode of hypoxia. This was however concerning for which an electrophysiology consulted to evaluate her AV node to exclude a need for a pacemaker placement for discharge.     Consults this admission:  IP CONSULT TO HOSPITALIST  PHARMACY TO DOSE WARFARIN  IP CONSULT TO CARDIOLOGY  IP CONSULT TO GI  IP CONSULT TO ELECTROPHYSIOLOGY    Discharge Instruction:   Follow up appointments:  Primary care physician:  within 2 weeks    Diet:  cardiac diet   Activity: activity as tolerated  Disposition: Discharged to:   []Home, []C, []SNF, []Acute Rehab, []Hospice   Condition on discharge: Stable    Discharge Medications:      Neftalyy Murrain   Home Medication Instructions YQS:[id number]    Printed on:19 8787   Medication Information                      acetaminophen (TYLENOL) 325 MG tablet  Take 2 tablets by mouth every 4 hours as needed for Pain             albuterol sulfate  (90 Base) MCG/ACT inhaler  Inhale 2 puffs into the lungs 4 times daily as needed for Wheezing             amLODIPine (NORVASC) 10 MG tablet  Take 1 tablet by mouth nightly Hold for SBP < 130             baclofen (LIORESAL) 20 MG tablet  Take 20 mg by mouth 3 times daily             carvedilol (COREG) 12.5 MG tablet  Take 1 tablet by mouth 2 times daily (with meals) Hold for HR< 60 or SBP < 120             cloNIDine (CATAPRES) 0.2 MG tablet  Take 0.3 mg by mouth 3 times daily              clopidogrel (PLAVIX) 75 MG tablet  Take 75 mg by mouth nightly              donepezil (ARICEPT) 10 MG tablet  Take 10 mg by mouth nightly             furosemide (LASIX) 20 MG tablet  Take 20 mg by mouth 2 times daily             gabapentin (NEURONTIN) 300 MG capsule  Take 300 mg by mouth 3 times daily. ibuprofen (ADVIL;MOTRIN) 600 MG tablet  Take 1 tablet by mouth every 6 hours as needed for Pain             LORazepam (ATIVAN) 0.5 MG tablet  Take 0.5 mg by mouth 2 times daily. metFORMIN (GLUCOPHAGE) 500 MG tablet  Take 500 mg by mouth daily (with breakfast)             methocarbamol (ROBAXIN) 500 MG tablet  Take 1 tablet by mouth 4 times daily As needed for muscle spasm. nitroGLYCERIN (NITROSTAT) 0.4 MG SL tablet  Place 1 tablet under the tongue every 5 minutes as needed for Chest pain.              ondansetron (ZOFRAN ODT) 4 MG disintegrating tablet  Take 1 tablet by mouth every 8 hours as needed for Nausea             pantoprazole (PROTONIX) 20 MG tablet  Take 20 mg by mouth daily             PARoxetine (PAXIL) 10 MG tablet  Take 20 mg by mouth every morning              potassium chloride (KLOR-CON) 10 MEQ extended release tablet  Take 10 mEq by mouth daily              QUEtiapine (SEROQUEL) 100 MG tablet  Take 100 mg by mouth nightly              valsartan (DIOVAN) 320 MG tablet  Take 320 mg by mouth daily             warfarin (COUMADIN) 1 MG tablet  Take 3 tablets by mouth daily Adjust dosing based on INR warfarin (COUMADIN) 4 MG tablet  1 tab by mouth once daily             zolpidem (AMBIEN) 10 MG tablet  Take by mouth nightly as needed for Sleep. Objective Findings at Discharge:   BP (!) 127/57   Pulse 60   Temp 98.4 °F (36.9 °C) (Oral)   Resp 14   Ht 5' 7\" (1.702 m)   Wt 211 lb 12.8 oz (96.1 kg)   SpO2 100%   BMI 33.17 kg/m²            PHYSICAL EXAM   GEN Awake female, sitting upright in bed in no apparent distress. Appears given age. EYES Pupils are equally round. No scleral erythema, discharge, or conjunctivitis. HENT Mucous membranes are moist. Oral pharynx without exudates, no evidence of thrush. NECK Supple, no apparent thyromegaly or masses. RESP Clear to auscultation, no wheezes, rales or rhonchi. Symmetric chest movement while on room air. CARDIO/VASC S1/S2 auscultated. Regular rate without appreciable murmurs, rubs, or gallops. No JVD or carotid bruits. Peripheral pulses equal bilaterally and palpable. No peripheral edema. GI Abdomen is soft without significant tenderness, masses, or guarding. Bowel sounds are normoactive. Rectal exam deferred. HEME/LYMPH No palpable cervical lymphadenopathy and no hepatosplenomegaly. No petechiae or ecchymoses. MSK No gross joint deformities. Mild bilateral pedal edema  SKIN Normal coloration, warm, dry. NEURO Cranial nerves appear grossly intact, normal speech, no lateralizing weakness. PSYCH Awake, alert, oriented x 4. Affect appropriate.     BMP/CBC  Recent Labs     04/22/19  0550 04/23/19  0414   NA  --  139   K  --  4.4   CL  --  107   CO2  --  23   BUN  --  13   CREATININE  --  1.0   WBC 13.6* 8.6   HCT 33.7* 30.6*    183         Discharge Time of 35 minutes    Electronically signed by Gillian Henderson MD on 4/24/2019 at 5:47 PM

## 2019-04-24 NOTE — PROGRESS NOTES
Cardiology Progress Note       Kassi Deng is a 76 y.o. female   1950     SUBJECTIVE:   Patient  Seen  And examined  Rhythm  Is    Sinus    Nurse  Found  Rhythm  Strip  Of  Sinus  With  Ventricular  Standstill  And  Episode of second  Degree  AVB        OBJECTIVE:    Review of Systems:  General appearance: alert, appears stated age and cooperative  Skin: Skin color, texture, normal. No rashes or lesions  HEENT: No nose bleed, headache, vision problems  CV: C/O chest pain, tightness, pressure,   Respiratory: C/o no SOB, THOMAS, Orthopnea, PND  GI: No abdominal pain, black stool, bloating  Limbs: No c/o edema, pain, swelling, intermittent claudication, joint pains  Neuro: No dizziness, lightheadedness, syncope, gait problems, memory problems  Psych: grossly normal. No SI/depression. Vitals:   Blood pressure (!) 150/50, pulse 62, temperature 98.4 °F (36.9 °C), temperature source Oral, resp. rate 10, height 5' 7\" (1.702 m), weight 211 lb 12.8 oz (96.1 kg), SpO2 96 %, not currently breastfeeding.     HEENT: AT, NC, PERRLA  Neck: No JVD  Heart: S1 S2 audible, no murmur   Lungs: CTA   Abdomen: Nontender   Limbs: No edema   CNS: no focal deficit      Past Medical History:   Diagnosis Date    A-fib [facility])     ACS (acute coronary syndrome) ([address] 6/16/2014    TRINO (acute kidney injury) ([address] 5/21/2017    Anxiety disorder     Arthritis     Atrial fibrillation ([address]     Blood transfusion     CAD (coronary artery disease)     \"have 6 heart stents- follows with Dr Julio Ritter Cerebral artery occlusion with cerebral infarction ([address]     CHF (congestive heart failure) ([address]     pt. denies this dx    Coronary syndrome, acute ([address]     lesion of lad    Depression     Diabetes mellitus ([address]     \"dx 2 yr ago\"    Dysphagia     pt is unsure about this dx with phone assessment 3/6/2017    Fall     \"fell first week of [facility](8908)- reaching glass of water and fell out of bed\"\"have some bruising(on Plavix and Coumadin\" RARE 03/20/2019    TRICHOMONAS NONE SEEN 04/20/2019    YEAST RARE 07/13/2018    BACTERIA NEGATIVE 04/20/2019    CLARITYU CLEAR 04/20/2019    SPECGRAV >1.060 04/20/2019    SPECGRAV  04/20/2019     (NOTE)  CONSIDER URINE OSMOLARITY TEST IF CLINICALLY INDICATED        LEUKOCYTESUR NEGATIVE 04/20/2019    UROBILINOGEN NORMAL 04/20/2019    BILIRUBINUR NEGATIVE 04/20/2019    BLOODU NEGATIVE 04/20/2019     ABG:    Lab Results   Component Value Date    XXS8UMX 28.0 04/22/2019    PO2ART 40 04/22/2019    FYE7LOK 20.4 04/22/2019     FLP:    Lab Results   Component Value Date    TRIG 364 07/14/2018    HDL 46 07/14/2018    LDLDIRECT 93 07/14/2018     TSH:  No results found for: TSH   DATA:   ECG: Sinus Rhythm       ASSESSMENT:   1  Second  Degree  AVB  And  Ventricular  Standstill  Reported  In  Setting  Of  Hypoxemia  And  Apneic  episode  But  Ashley Weiss,  Have  Documentation   2  Cad  pci  In  Past    Chest  painfree  3  Hypertension  Well  Controlled  4  PAF   remainjs  In  Sinus  Rhythm  5  High  Cholesterol  6  obesity  PLAN    EP  consult

## 2019-04-24 NOTE — PLAN OF CARE
Problem: Falls - Risk of:  Goal: Will remain free from falls  Description  Will remain free from falls  4/24/2019 1747 by Kailey Be RN  Outcome: Ongoing  4/24/2019 1446 by Donna Ryan  Outcome: Ongoing  Goal: Absence of physical injury  Description  Absence of physical injury  4/24/2019 1747 by Kailey Be RN  Outcome: Ongoing  4/24/2019 1446 by Donna Ryan  Outcome: Ongoing     Problem: Pain:  Goal: Pain level will decrease  Description  Pain level will decrease  4/24/2019 1747 by Kailey Be RN  Outcome: Ongoing  4/24/2019 1446 by Donna Ryan  Outcome: Ongoing  Goal: Control of acute pain  Description  Control of acute pain  4/24/2019 1747 by Kailey Be RN  Outcome: Ongoing  4/24/2019 1446 by Donna Ryan  Outcome: Ongoing  Goal: Control of chronic pain  Description  Control of chronic pain  4/24/2019 1747 by Kailey Be RN  Outcome: Ongoing  4/24/2019 1446 by Donna Ryan  Outcome: Ongoing

## 2019-04-25 VITALS
HEART RATE: 60 BPM | WEIGHT: 211.8 LBS | RESPIRATION RATE: 18 BRPM | HEIGHT: 67 IN | OXYGEN SATURATION: 100 % | TEMPERATURE: 97.1 F | SYSTOLIC BLOOD PRESSURE: 140 MMHG | DIASTOLIC BLOOD PRESSURE: 67 MMHG | BODY MASS INDEX: 33.24 KG/M2

## 2019-04-25 LAB
GLUCOSE BLD-MCNC: 101 MG/DL (ref 70–99)
GLUCOSE BLD-MCNC: 109 MG/DL (ref 70–99)
GLUCOSE BLD-MCNC: 121 MG/DL (ref 70–99)
INR BLD: 1.69 INDEX
PROTHROMBIN TIME: 19.5 SECONDS (ref 9.12–12.5)

## 2019-04-25 PROCEDURE — 6370000000 HC RX 637 (ALT 250 FOR IP): Performed by: HOSPITALIST

## 2019-04-25 PROCEDURE — 2580000003 HC RX 258: Performed by: HOSPITALIST

## 2019-04-25 PROCEDURE — 99223 1ST HOSP IP/OBS HIGH 75: CPT | Performed by: INTERNAL MEDICINE

## 2019-04-25 PROCEDURE — 36415 COLL VENOUS BLD VENIPUNCTURE: CPT

## 2019-04-25 PROCEDURE — 6360000002 HC RX W HCPCS: Performed by: HOSPITALIST

## 2019-04-25 PROCEDURE — 82962 GLUCOSE BLOOD TEST: CPT

## 2019-04-25 PROCEDURE — APPNB60 APP NON BILLABLE TIME 46-60 MINS: Performed by: NURSE PRACTITIONER

## 2019-04-25 PROCEDURE — 85610 PROTHROMBIN TIME: CPT

## 2019-04-25 RX ORDER — HYDRALAZINE HYDROCHLORIDE 25 MG/1
25 TABLET, FILM COATED ORAL 3 TIMES DAILY
Qty: 90 TABLET | Refills: 3 | Status: SHIPPED | OUTPATIENT
Start: 2019-04-25

## 2019-04-25 RX ADMIN — VALSARTAN 320 MG: 160 TABLET, FILM COATED ORAL at 09:35

## 2019-04-25 RX ADMIN — GABAPENTIN 300 MG: 300 CAPSULE ORAL at 16:19

## 2019-04-25 RX ADMIN — GABAPENTIN 300 MG: 300 CAPSULE ORAL at 09:35

## 2019-04-25 RX ADMIN — PAROXETINE HYDROCHLORIDE 20 MG: 20 TABLET, FILM COATED ORAL at 09:44

## 2019-04-25 RX ADMIN — BACLOFEN 20 MG: 10 TABLET ORAL at 09:35

## 2019-04-25 RX ADMIN — ACETAMINOPHEN 650 MG: 325 TABLET ORAL at 03:16

## 2019-04-25 RX ADMIN — SODIUM CHLORIDE, PRESERVATIVE FREE 10 ML: 5 INJECTION INTRAVENOUS at 09:36

## 2019-04-25 RX ADMIN — PANTOPRAZOLE SODIUM 40 MG: 40 TABLET, DELAYED RELEASE ORAL at 06:53

## 2019-04-25 RX ADMIN — CLONIDINE HYDROCHLORIDE 0.3 MG: 0.2 TABLET ORAL at 09:35

## 2019-04-25 RX ADMIN — BACLOFEN 20 MG: 10 TABLET ORAL at 16:19

## 2019-04-25 RX ADMIN — DICYCLOMINE HYDROCHLORIDE 20 MG: 20 TABLET ORAL at 11:32

## 2019-04-25 RX ADMIN — CEFTRIAXONE 1 G: 1 INJECTION, POWDER, FOR SOLUTION INTRAMUSCULAR; INTRAVENOUS at 16:20

## 2019-04-25 RX ADMIN — WARFARIN SODIUM 5 MG: 5 TABLET ORAL at 16:17

## 2019-04-25 RX ADMIN — POTASSIUM CHLORIDE 10 MEQ: 750 TABLET, EXTENDED RELEASE ORAL at 09:44

## 2019-04-25 RX ADMIN — DICYCLOMINE HYDROCHLORIDE 20 MG: 20 TABLET ORAL at 16:17

## 2019-04-25 RX ADMIN — DICYCLOMINE HYDROCHLORIDE 20 MG: 20 TABLET ORAL at 06:53

## 2019-04-25 ASSESSMENT — ENCOUNTER SYMPTOMS
CONSTIPATION: 0
ABDOMINAL PAIN: 1
EYE ITCHING: 0
NAUSEA: 1
SHORTNESS OF BREATH: 0
DIARRHEA: 1
VOMITING: 0
SINUS PAIN: 0
EYE REDNESS: 0
CHEST TIGHTNESS: 0
SINUS PRESSURE: 0
SORE THROAT: 0

## 2019-04-25 ASSESSMENT — PAIN SCALES - GENERAL
PAINLEVEL_OUTOF10: 0
PAINLEVEL_OUTOF10: 0

## 2019-04-25 NOTE — PROCEDURES
[address] [place] [address], [address] [facility]                                 HOLTER MONITOR    PATIENT NAME: Mary Ellen Metz                    :        1950  MED REC NO:   [id number]                          ROOM:       3103  ACCOUNT NO:   [de-identified]                           ADMIT DATE: 2019  PROVIDER:     Kristina Rock MD    HOLTER MONITOR 24-HOURS    DATE OF STUDY:  2019    FINDINGS:  The patient is recorded for a total of 24 hours. Rhythm is  sinus. Average heart rate is 52 beats per minute. Minimal heart rate  about 46 beats per minute, recorded at 7:04, maximum heart rate was 108  recorded at 0037. There were no PVCs. There were rare PACs of 11 beats  over 24 hours. CONCLUSION:  Unremarkable Holter recording, rhythm sinus, average heart  rate 52, minimal rate of 46 with the maximum heart rate of 108 with PVCs  and rare PACs 11 beats over 24 hours.         Maksim Be MD    D: 2019 10:33:33       T: 2019 10:54:44     [email or web address]  Job#: [id number]     Doc#: [id number]    CC:  <>

## 2019-04-25 NOTE — PLAN OF CARE
Problem: Falls - Risk of:  Goal: Will remain free from falls  Description  Will remain free from falls  4/25/2019 0413 by Diana Kinsey RN  Outcome: Ongoing  4/24/2019 1747 by Jhonathan Perez RN  Outcome: Ongoing  4/24/2019 1446 by Lloyd Carlson  Outcome: Ongoing  Goal: Absence of physical injury  Description  Absence of physical injury  4/25/2019 0413 by Diana Kinsey RN  Outcome: Ongoing  4/24/2019 1747 by Jhonathan Perez RN  Outcome: Ongoing  4/24/2019 1446 by Lloyd Carlson  Outcome: Ongoing     Problem: Pain:  Goal: Pain level will decrease  Description  Pain level will decrease  4/25/2019 0413 by Diana Kinsey RN  Outcome: Ongoing  4/24/2019 174Ambrocio by Jhonathan Perez RN  Outcome: Ongoing  4/24/2019 1446 by Lloyd Carlson  Outcome: Ongoing  Goal: Control of acute pain  Description  Control of acute pain  4/25/2019 0413 by Diana Kinsey RN  Outcome: Ongoing  4/24/2019 1747 by Jhonathan Perez RN  Outcome: Ongoing  4/24/2019 1446 by Lloyd Carlson  Outcome: Ongoing  Goal: Control of chronic pain  Description  Control of chronic pain  4/25/2019 0413 by Diana Kinsey RN  Outcome: Ongoing  4/24/2019 174Ambrocio by Jhonathan Perez RN  Outcome: Ongoing  4/24/2019 1446 by Lloyd Carlson  Outcome: Ongoing

## 2019-04-25 NOTE — PROGRESS NOTES
Pt heart rate settled in the mid to low 40's for most of last night. Previously it sat in the mid to high 50's. Will PS cardiology.

## 2019-04-25 NOTE — PROGRESS NOTES
Dr. Shanice Alicia aware of bradycardia. Dr. Shanice Alicia instructed this nurse to hold Clonidine and that he would adjust her meds for discharge.

## 2019-04-25 NOTE — PROGRESS NOTES
Paged Dr. Katrin Cortez to see if Clonidine should be given or not with HR 49-50, await response and to see if okay to discharge. Await response.

## 2019-04-25 NOTE — PROGRESS NOTES
Cardiology Progress Note       Gaviota Carter is a 76 y.o. female   1950     SUBJECTIVE:   Patient  Seen  And  Examined    No  Chest pain  Feels  Fine  Some  Bradycardia      But  BP  normal  OBJECTIVE:    Review of Systems:  General appearance: alert, appears stated age and cooperative  Skin: Skin color, texture, normal. No rashes or lesions  HEENT: No nose bleed, headache, vision problems  CV: C/O chest pain, tightness, pressure,   Respiratory: C/o no SOB, THOMAS, Orthopnea, PND  GI: No abdominal pain, black stool, bloating  Limbs: No c/o edema, pain, swelling, intermittent claudication, joint pains  Neuro: No dizziness, lightheadedness, syncope, gait problems, memory problems  Psych: grossly normal. No SI/depression. Vitals:   Blood pressure (!) 149/70, pulse 62, temperature 98 °F (36.7 °C), resp. rate 19, height 5' 7\" (1.702 m), weight 211 lb 12.8 oz (96.1 kg), SpO2 100 %, not currently breastfeeding.     HEENT: AT, NC, PERRLA  Neck: No JVD  Heart: S1 S2 audible, no murmur   Lungs: CTA   Abdomen: Nontender   Limbs: No edema   CNS: no focal deficit      Past Medical History:   Diagnosis Date    A-fib [facility])     ACS (acute coronary syndrome) ([address] 6/16/2014    TRINO (acute kidney injury) ([address] 5/21/2017    Anxiety disorder     Arthritis     Atrial fibrillation ([address]     Blood transfusion     CAD (coronary artery disease)     \"have 6 heart stents- follows with Dr Gt Baron Cerebral artery occlusion with cerebral infarction ([address]     CHF (congestive heart failure) ([address]     pt. denies this dx    Coronary syndrome, acute ([address]     lesion of lad    Depression     Diabetes mellitus ([address]     \"dx 2 yr ago\"    Dysphagia     pt is unsure about this dx with phone assessment 3/6/2017    Fall     \"fell first week of IASPI(4917)- reaching glass of water and fell out of bed\"\"have some bruising(on Plavix and Coumadin\" but did not hurt myself\"    Hyperlipidemia     Hypertension     Lumbar disc herniation following with Dr Tiffanie Ritter- they have to remove the reveal monitor to get a MRI of the back\"    Nausea & vomiting     Parkinson's disease ([facility]     Reflux         Patient Active Problem List   Diagnosis    Abnormal nuclear cardiac imaging test    ASHD (arteriosclerotic heart disease)    AF [facility] fibrillation) [facility]    Obesity (BMI 30.0-34. 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 ([facility]    General weakness    Multiple falls    Chronic left-sided low back pain with left-sided sciatica    Neuropathy    Pedal edema    Encephalopathy    Hypokalemia    Dyspnea    Angina pectoris (HCC)    Diuretic-induced hypokalemia    Abdominal pain    AV block, 2nd degree        Allergies   Allergen Reactions    Tetracyclines & Related Other (See Comments)     hallucinations    Bactrim [Sulfamethoxazole-Trimethoprim] Diarrhea    Codeine Hives        Current Inpatient Medications:    Current Facility-Administered Medications   Medication Dose Route Frequency Provider Last Rate Last Dose    warfarin (COUMADIN) tablet 5 mg  5 mg Oral Daily Yesi Jones MD   5 mg at 04/24/19 1734    naloxone (NARCAN) injection 0.4 mg  0.4 mg Intravenous PRN Wolfgang Erazo MD   0.4 mg at 04/22/19 0848    cefTRIAXone (ROCEPHIN) 1 g in dextrose 5 % 50 mL IVPB  1 g Intravenous Q24H Wolfgang Erazo MD   Stopped at 04/24/19 1230    lactulose (CHRONULAC) 10 GM/15ML solution 20 g  20 g Oral BID Wolfgang Erazo MD   20 g at 04/22/19 1704    albuterol sulfate  (90 Base) MCG/ACT inhaler 2 puff  2 puff Inhalation 4x Daily PRN Ted Ventura MD        amLODIPine (NORVASC) tablet 10 mg  10 mg Oral Nightly Ted Ventura MD   10 mg at 04/24/19 2132    baclofen (LIORESAL) tablet 20 mg  20 mg Oral TID Ted Ventura MD   20 mg at 04/25/19 0935    cloNIDine (CATAPRES) tablet 0.3 mg  0.3 mg Oral TID Ted Ventura MD   0.3 mg at 04/25/19  glucagon (rDNA) injection 1 mg  1 mg Intramuscular PRN Bebeto Cuevas MD        dextrose 5 % solution  100 mL/hr Intravenous PRN Bebeto Cuevas MD        pantoprazole (PROTONIX) tablet 40 mg  40 mg Oral Daily Tim Pratt MD   40 mg at 04/25/19 0653           Labs:  CBC with Differential:    Lab Results   Component Value Date    WBC 8.6 04/23/2019    RBC 3.14 04/23/2019    HGB 9.0 04/23/2019    HCT 30.6 04/23/2019     04/23/2019    MCV 97.5 04/23/2019    MCH 28.7 04/23/2019    MCHC 29.4 04/23/2019    RDW 15.0 04/23/2019    SEGSPCT 78.0 04/20/2019    BANDSPCT 2 04/20/2019    LYMPHOPCT 16.0 04/20/2019    MONOPCT 1.0 04/20/2019    EOSPCT 1.9 03/29/2019    BASOPCT 1.0 04/20/2019    MONOSABS 0.2 04/20/2019    LYMPHSABS 3.1 04/20/2019    EOSABS 0.4 04/20/2019    BASOSABS 0.2 04/20/2019    DIFFTYPE MANUAL DIFFERENTIAL 04/20/2019     CMP:    Lab Results   Component Value Date     04/23/2019    K 4.4 04/23/2019     04/23/2019    CO2 23 04/23/2019    BUN 13 04/23/2019    CREATININE 1.0 04/23/2019    GFRAA >60 04/23/2019    LABGLOM 55 04/23/2019    GLUCOSE 101 04/23/2019    PROT 5.2 04/23/2019    LABALBU 3.3 04/23/2019    CALCIUM 8.2 04/23/2019    BILITOT 0.2 04/23/2019    ALKPHOS 86 04/23/2019    AST 10 04/23/2019    ALT 10 04/23/2019     Hepatic Function Panel:    Lab Results   Component Value Date    ALKPHOS 86 04/23/2019    ALT 10 04/23/2019    AST 10 04/23/2019    PROT 5.2 04/23/2019    BILITOT 0.2 04/23/2019    BILIDIR 0.2 06/11/2018    IBILI 0.1 06/11/2018    LABALBU 3.3 04/23/2019     Magnesium:    Lab Results   Component Value Date    MG 2.1 04/23/2019     PT/INR:    Lab Results   Component Value Date    PROTIME 19.5 04/25/2019    INR 1.69 04/25/2019     Last 3 Troponin:  No results found for: TROPONINI  U/A:    Lab Results   Component Value Date    COLORU STRAW 04/20/2019    WBCUA <1 04/20/2019    RBCUA <1 04/20/2019    MUCUS RARE 03/20/2019    TRICHOMONAS NONE SEEN 04/20/2019    YEAST RARE

## 2019-04-25 NOTE — DISCHARGE SUMMARY
Discharge Summary    Name:  Yannick Corley /Age/Sex: 1950  (76 y.o. female)   MRN & CSN:  [id number] & [id number] Admission Date/Time: 2019  6:02 PM   Attending:  Maximo Gimenez MD Discharging Physician: Maximo Gimenez MD     Hospital Course:     Discharged diagnosis    Metabolic encephalopathy  Acute diarrhea  Paroxysmal atrial fibrillation on Coumadin  Hypoxia with ventricular  pauses  Hypertension  Dementia    70-year-old female with a past medical history significant for CAD status post PCI, DM type II, hypertension presents to the ED with diarrhea which had been ongoing for about 3 weeks and not responsive to Augmentin which she was receiving for suspected  Colitis. She also complained of chest pain and shortness of breath on presentation,  Of note she had a cardiac catheterization done on 19 which showed moderate CAD with some in-stent restenosis. There was a concern for urinary tract infection for which she was started on ceftriaxone. Patient however overnight developed a ventricular pauses which was associated with her episode of hypoxia. This was however concerning for which an electrophysiology consulted to evaluate her AV node to exclude a need for a pacemaker placement for discharge. patient was reviewed by electrophysiology team and the thought was she may need a pacemaker placed however the team recommended initially starting with an event monitor. patient however is planning to move to [place] permanently in about 10 days for which she was advised to establish and follow up with a cardiologist at [place] to Be evaluated for the event monitor placement. patient had an episode of altered mental status and was covered with ceftriaxone however no evidence of infection was established. Antibiotics discontinued.     Consults this admission:  IP CONSULT TO HOSPITALIST  PHARMACY TO DOSE WARFARIN  IP CONSULT TO CARDIOLOGY  IP CONSULT TO GI  IP CONSULT TO ELECTROPHYSIOLOGY    Discharge Instruction:   Follow up appointments:  cardiology within 2 weeks  Primary care physician:  within 2 weeks    Diet:  cardiac diet   Activity: activity as tolerated  Disposition: Discharged to:   [x]Home, []C, []SNF, []Acute Rehab, []Hospice   Condition on discharge: Stable    Discharge Medications:      Franco YionWesley Cindy Medication Instructions BYR:[id number]    Printed on:04/25/19 4536   Medication Information                      acetaminophen (TYLENOL) 325 MG tablet  Take 2 tablets by mouth every 4 hours as needed for Pain             albuterol sulfate  (90 Base) MCG/ACT inhaler  Inhale 2 puffs into the lungs 4 times daily as needed for Wheezing             amLODIPine (NORVASC) 10 MG tablet  Take 1 tablet by mouth nightly Hold for SBP < 130             baclofen (LIORESAL) 20 MG tablet  Take 20 mg by mouth 3 times daily             carvedilol (COREG) 12.5 MG tablet  Take 1 tablet by mouth 2 times daily (with meals) Hold for HR< 60 or SBP < 120             cloNIDine (CATAPRES) 0.2 MG tablet  Take 0.3 mg by mouth 3 times daily              clopidogrel (PLAVIX) 75 MG tablet  Take 75 mg by mouth nightly              donepezil (ARICEPT) 10 MG tablet  Take 10 mg by mouth nightly             furosemide (LASIX) 20 MG tablet  Take 40 mg by mouth daily as needed              gabapentin (NEURONTIN) 300 MG capsule  Take 300 mg by mouth 3 times daily. ibuprofen (ADVIL;MOTRIN) 600 MG tablet  Take 1 tablet by mouth every 6 hours as needed for Pain             LORazepam (ATIVAN) 0.5 MG tablet  Take 0.5 mg by mouth 2 times daily. metFORMIN (GLUCOPHAGE) 500 MG tablet  Take 500 mg by mouth daily (with breakfast)             methocarbamol (ROBAXIN) 500 MG tablet  Take 1 tablet by mouth 4 times daily As needed for muscle spasm. nitroGLYCERIN (NITROSTAT) 0.4 MG SL tablet  Place 1 tablet under the tongue every 5 minutes as needed for Chest pain. ondansetron (ZOFRAN ODT) 4 MG disintegrating tablet  Take 1 tablet by mouth every 8 hours as needed for Nausea             pantoprazole (PROTONIX) 20 MG tablet  Take 20 mg by mouth daily             PARoxetine (PAXIL) 10 MG tablet  Take 20 mg by mouth every morning              potassium chloride (KLOR-CON) 10 MEQ extended release tablet  Take 10 mEq by mouth daily              QUEtiapine (SEROQUEL) 100 MG tablet  Take 100 mg by mouth nightly              valsartan (DIOVAN) 320 MG tablet  Take 320 mg by mouth daily             warfarin (COUMADIN) 1 MG tablet  Take 3 tablets by mouth daily Adjust dosing based on INR             warfarin (COUMADIN) 4 MG tablet  1 tab by mouth once daily             zolpidem (AMBIEN) 10 MG tablet  Take by mouth nightly as needed for Sleep. Objective Findings at Discharge:   BP (!) 140/67   Pulse 60   Temp 97.1 °F (36.2 °C)   Resp 18   Ht 5' 7\" (1.702 m)   Wt 211 lb 12.8 oz (96.1 kg)   SpO2 100%   BMI 33.17 kg/m²            PHYSICAL EXAM   GEN Awake female, sitting upright in bed in no apparent distress. Appears given age. EYES Pupils are equally round. No scleral erythema, discharge, or conjunctivitis. HENT Mucous membranes are moist. Oral pharynx without exudates, no evidence of thrush. NECK Supple, no apparent thyromegaly or masses. RESP Clear to auscultation, no wheezes, rales or rhonchi. Symmetric chest movement while on room air. CARDIO/VASC S1/S2 auscultated. Regular rate without appreciable murmurs, rubs, or gallops. No JVD or carotid bruits. Peripheral pulses equal bilaterally and palpable. No peripheral edema. GI Abdomen is soft without significant tenderness, masses, or guarding. Bowel sounds are normoactive. Rectal exam deferred. HEME/LYMPH No palpable cervical lymphadenopathy and no hepatosplenomegaly. No petechiae or ecchymoses. MSK No gross joint deformities.   Mild bilateral pedal edema  SKIN Normal coloration, warm, dry.  NEURO Cranial nerves appear grossly intact, normal speech, no lateralizing weakness. PSYCH Awake, alert, oriented x 4. Affect appropriate.     BMP/CBC  Recent Labs     04/23/19  0414      K 4.4      CO2 23   BUN 13   CREATININE 1.0   WBC 8.6   HCT 30.6*            Discharge Time of 35 minutes    Electronically signed by Gerson Trivedi MD on 4/25/2019 at 5:53 PM
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
## 2019-05-22 ENCOUNTER — APPOINTMENT (OUTPATIENT)
Dept: CT IMAGING | Facility: HOSPITAL | Age: 69
End: 2019-05-22

## 2019-05-22 ENCOUNTER — APPOINTMENT (OUTPATIENT)
Dept: GENERAL RADIOLOGY | Facility: HOSPITAL | Age: 69
End: 2019-05-22

## 2019-05-22 ENCOUNTER — HOSPITAL ENCOUNTER (EMERGENCY)
Facility: HOSPITAL | Age: 69
Discharge: HOME | End: 2019-05-22
Attending: FAMILY MEDICINE | Admitting: FAMILY MEDICINE

## 2019-05-22 VITALS
HEIGHT: 67 IN | OXYGEN SATURATION: 100 % | WEIGHT: 202 LBS | BODY MASS INDEX: 31.71 KG/M2 | RESPIRATION RATE: 20 BRPM | TEMPERATURE: 97.8 F | SYSTOLIC BLOOD PRESSURE: 154 MMHG | DIASTOLIC BLOOD PRESSURE: 78 MMHG | HEART RATE: 65 BPM

## 2019-05-22 DIAGNOSIS — K52.9 GASTROENTERITIS: Primary | ICD-10-CM

## 2019-05-22 LAB
ALBUMIN SERPL-MCNC: 4.29 G/DL (ref 3.5–5.2)
ALBUMIN/GLOB SERPL: 1.4 G/DL
ALP SERPL-CCNC: 85 U/L (ref 39–117)
ALT SERPL W P-5'-P-CCNC: 16 U/L (ref 1–33)
ANION GAP SERPL CALCULATED.3IONS-SCNC: 17 MMOL/L
AST SERPL-CCNC: 23 U/L (ref 1–32)
BACTERIA UR QL AUTO: ABNORMAL /HPF
BASOPHILS # BLD AUTO: 0.03 10*3/MM3 (ref 0–0.2)
BASOPHILS NFR BLD AUTO: 0.5 % (ref 0–1.5)
BILIRUB SERPL-MCNC: 0.2 MG/DL (ref 0.2–1.2)
BILIRUB UR QL STRIP: NEGATIVE
BUN BLD-MCNC: 16 MG/DL (ref 8–23)
BUN/CREAT SERPL: 17.6 (ref 7–25)
CALCIUM SPEC-SCNC: 9.8 MG/DL (ref 8.6–10.5)
CHLORIDE SERPL-SCNC: 110 MMOL/L (ref 98–107)
CLARITY UR: CLEAR
CO2 SERPL-SCNC: 17 MMOL/L (ref 22–29)
COLOR UR: YELLOW
CREAT BLD-MCNC: 0.91 MG/DL (ref 0.57–1)
DEPRECATED RDW RBC AUTO: 47.9 FL (ref 37–54)
EOSINOPHIL # BLD AUTO: 0.12 10*3/MM3 (ref 0–0.4)
EOSINOPHIL NFR BLD AUTO: 1.9 % (ref 0.3–6.2)
ERYTHROCYTE [DISTWIDTH] IN BLOOD BY AUTOMATED COUNT: 14.9 % (ref 12.3–15.4)
GFR SERPL CREATININE-BSD FRML MDRD: 61 ML/MIN/1.73
GLOBULIN UR ELPH-MCNC: 3 GM/DL
GLUCOSE BLD-MCNC: 119 MG/DL (ref 65–99)
GLUCOSE BLDC GLUCOMTR-MCNC: 109 MG/DL (ref 70–130)
GLUCOSE UR STRIP-MCNC: NEGATIVE MG/DL
HCT VFR BLD AUTO: 33.6 % (ref 34–46.6)
HGB BLD-MCNC: 10.2 G/DL (ref 12–15.9)
HGB UR QL STRIP.AUTO: NEGATIVE
HOLD SPECIMEN: NORMAL
HOLD SPECIMEN: NORMAL
HYALINE CASTS UR QL AUTO: ABNORMAL /LPF
IMM GRANULOCYTES # BLD AUTO: 0.01 10*3/MM3 (ref 0–0.05)
IMM GRANULOCYTES NFR BLD AUTO: 0.2 % (ref 0–0.5)
KETONES UR QL STRIP: NEGATIVE
LEUKOCYTE ESTERASE UR QL STRIP.AUTO: ABNORMAL
LYMPHOCYTES # BLD AUTO: 1.67 10*3/MM3 (ref 0.7–3.1)
LYMPHOCYTES NFR BLD AUTO: 27.1 % (ref 19.6–45.3)
MAGNESIUM SERPL-MCNC: 1.8 MG/DL (ref 1.6–2.4)
MCH RBC QN AUTO: 28.1 PG (ref 26.6–33)
MCHC RBC AUTO-ENTMCNC: 30.4 G/DL (ref 31.5–35.7)
MCV RBC AUTO: 92.6 FL (ref 79–97)
MONOCYTES # BLD AUTO: 0.34 10*3/MM3 (ref 0.1–0.9)
MONOCYTES NFR BLD AUTO: 5.5 % (ref 5–12)
NEUTROPHILS # BLD AUTO: 3.99 10*3/MM3 (ref 1.7–7)
NEUTROPHILS NFR BLD AUTO: 64.8 % (ref 42.7–76)
NITRITE UR QL STRIP: NEGATIVE
PH UR STRIP.AUTO: <=5 [PH] (ref 5–8)
PLATELET # BLD AUTO: 249 10*3/MM3 (ref 140–450)
PMV BLD AUTO: 10.4 FL (ref 6–12)
POTASSIUM BLD-SCNC: 3.8 MMOL/L (ref 3.5–5.2)
PROT SERPL-MCNC: 7.3 G/DL (ref 6–8.5)
PROT UR QL STRIP: NEGATIVE
RBC # BLD AUTO: 3.63 10*6/MM3 (ref 3.77–5.28)
RBC # UR: ABNORMAL /HPF
REF LAB TEST METHOD: ABNORMAL
SODIUM BLD-SCNC: 144 MMOL/L (ref 136–145)
SP GR UR STRIP: 1.02 (ref 1–1.03)
SQUAMOUS #/AREA URNS HPF: ABNORMAL /HPF
TROPONIN T SERPL-MCNC: <0.01 NG/ML (ref 0–0.03)
UROBILINOGEN UR QL STRIP: ABNORMAL
WBC NRBC COR # BLD: 6.16 10*3/MM3 (ref 3.4–10.8)
WBC UR QL AUTO: ABNORMAL /HPF
WHOLE BLOOD HOLD SPECIMEN: NORMAL
WHOLE BLOOD HOLD SPECIMEN: NORMAL

## 2019-05-22 PROCEDURE — 85025 COMPLETE CBC W/AUTO DIFF WBC: CPT | Performed by: EMERGENCY MEDICINE

## 2019-05-22 PROCEDURE — 80053 COMPREHEN METABOLIC PANEL: CPT | Performed by: EMERGENCY MEDICINE

## 2019-05-22 PROCEDURE — 71045 X-RAY EXAM CHEST 1 VIEW: CPT

## 2019-05-22 PROCEDURE — 93005 ELECTROCARDIOGRAM TRACING: CPT | Performed by: FAMILY MEDICINE

## 2019-05-22 PROCEDURE — 36415 COLL VENOUS BLD VENIPUNCTURE: CPT

## 2019-05-22 PROCEDURE — 93010 ELECTROCARDIOGRAM REPORT: CPT | Performed by: INTERNAL MEDICINE

## 2019-05-22 PROCEDURE — 99285 EMERGENCY DEPT VISIT HI MDM: CPT

## 2019-05-22 PROCEDURE — 71045 X-RAY EXAM CHEST 1 VIEW: CPT | Performed by: RADIOLOGY

## 2019-05-22 PROCEDURE — 84484 ASSAY OF TROPONIN QUANT: CPT | Performed by: EMERGENCY MEDICINE

## 2019-05-22 PROCEDURE — 82962 GLUCOSE BLOOD TEST: CPT

## 2019-05-22 PROCEDURE — 74176 CT ABD & PELVIS W/O CONTRAST: CPT | Performed by: RADIOLOGY

## 2019-05-22 PROCEDURE — 81001 URINALYSIS AUTO W/SCOPE: CPT | Performed by: FAMILY MEDICINE

## 2019-05-22 PROCEDURE — 83735 ASSAY OF MAGNESIUM: CPT | Performed by: EMERGENCY MEDICINE

## 2019-05-22 PROCEDURE — 74176 CT ABD & PELVIS W/O CONTRAST: CPT

## 2019-05-22 PROCEDURE — 93005 ELECTROCARDIOGRAM TRACING: CPT | Performed by: EMERGENCY MEDICINE

## 2019-05-22 RX ORDER — SODIUM CHLORIDE 0.9 % (FLUSH) 0.9 %
10 SYRINGE (ML) INJECTION AS NEEDED
Status: DISCONTINUED | OUTPATIENT
Start: 2019-05-22 | End: 2019-05-22 | Stop reason: HOSPADM

## 2019-05-22 RX ORDER — PROMETHAZINE HYDROCHLORIDE 25 MG/1
25 TABLET ORAL EVERY 6 HOURS PRN
Qty: 15 TABLET | Refills: 0 | Status: SHIPPED | OUTPATIENT
Start: 2019-05-22

## 2019-05-22 RX ADMIN — SODIUM CHLORIDE 1000 ML: 9 INJECTION, SOLUTION INTRAVENOUS at 13:05

## 2019-06-19 ENCOUNTER — HOSPITAL ENCOUNTER (EMERGENCY)
Facility: HOSPITAL | Age: 69
Discharge: HOME | End: 2019-06-19
Attending: FAMILY MEDICINE | Admitting: FAMILY MEDICINE

## 2019-06-19 ENCOUNTER — APPOINTMENT (OUTPATIENT)
Dept: GENERAL RADIOLOGY | Facility: HOSPITAL | Age: 69
End: 2019-06-19

## 2019-06-19 VITALS
DIASTOLIC BLOOD PRESSURE: 72 MMHG | HEIGHT: 67 IN | OXYGEN SATURATION: 100 % | TEMPERATURE: 98.1 F | HEART RATE: 58 BPM | SYSTOLIC BLOOD PRESSURE: 158 MMHG | RESPIRATION RATE: 18 BRPM | BODY MASS INDEX: 32.8 KG/M2 | WEIGHT: 209 LBS

## 2019-06-19 DIAGNOSIS — G89.29 CHRONIC BILATERAL BACK PAIN, UNSPECIFIED BACK LOCATION: Primary | ICD-10-CM

## 2019-06-19 DIAGNOSIS — M54.9 CHRONIC BILATERAL BACK PAIN, UNSPECIFIED BACK LOCATION: Primary | ICD-10-CM

## 2019-06-19 LAB
ALBUMIN SERPL-MCNC: 4.49 G/DL (ref 3.5–5.2)
ALBUMIN/GLOB SERPL: 1.3 G/DL
ALP SERPL-CCNC: 83 U/L (ref 39–117)
ALT SERPL W P-5'-P-CCNC: 6 U/L (ref 1–33)
ANION GAP SERPL CALCULATED.3IONS-SCNC: 18.1 MMOL/L
AST SERPL-CCNC: 19 U/L (ref 1–32)
BASOPHILS # BLD AUTO: 0.05 10*3/MM3 (ref 0–0.2)
BASOPHILS NFR BLD AUTO: 0.5 % (ref 0–1.5)
BILIRUB SERPL-MCNC: 0.2 MG/DL (ref 0.2–1.2)
BUN BLD-MCNC: 26 MG/DL (ref 8–23)
BUN/CREAT SERPL: 22 (ref 7–25)
CALCIUM SPEC-SCNC: 10.1 MG/DL (ref 8.6–10.5)
CHLORIDE SERPL-SCNC: 104 MMOL/L (ref 98–107)
CK SERPL-CCNC: 40 U/L (ref 20–180)
CO2 SERPL-SCNC: 21.9 MMOL/L (ref 22–29)
CREAT BLD-MCNC: 1.18 MG/DL (ref 0.57–1)
CRP SERPL-MCNC: 0.12 MG/DL (ref 0–0.5)
DEPRECATED RDW RBC AUTO: 48.5 FL (ref 37–54)
EOSINOPHIL # BLD AUTO: 0.12 10*3/MM3 (ref 0–0.4)
EOSINOPHIL NFR BLD AUTO: 1.2 % (ref 0.3–6.2)
ERYTHROCYTE [DISTWIDTH] IN BLOOD BY AUTOMATED COUNT: 14.9 % (ref 12.3–15.4)
ERYTHROCYTE [SEDIMENTATION RATE] IN BLOOD: 12 MM/HR (ref 0–30)
GFR SERPL CREATININE-BSD FRML MDRD: 45 ML/MIN/1.73
GLOBULIN UR ELPH-MCNC: 3.4 GM/DL
GLUCOSE BLD-MCNC: 91 MG/DL (ref 65–99)
HCT VFR BLD AUTO: 35.3 % (ref 34–46.6)
HGB BLD-MCNC: 11 G/DL (ref 12–15.9)
IMM GRANULOCYTES # BLD AUTO: 0.02 10*3/MM3 (ref 0–0.05)
IMM GRANULOCYTES NFR BLD AUTO: 0.2 % (ref 0–0.5)
LYMPHOCYTES # BLD AUTO: 2.31 10*3/MM3 (ref 0.7–3.1)
LYMPHOCYTES NFR BLD AUTO: 22.4 % (ref 19.6–45.3)
MAGNESIUM SERPL-MCNC: 2.1 MG/DL (ref 1.6–2.4)
MCH RBC QN AUTO: 28.9 PG (ref 26.6–33)
MCHC RBC AUTO-ENTMCNC: 31.2 G/DL (ref 31.5–35.7)
MCV RBC AUTO: 92.9 FL (ref 79–97)
MONOCYTES # BLD AUTO: 0.61 10*3/MM3 (ref 0.1–0.9)
MONOCYTES NFR BLD AUTO: 5.9 % (ref 5–12)
MYOGLOBIN SERPL-MCNC: 66.1 NG/ML (ref 25–58)
NEUTROPHILS # BLD AUTO: 7.19 10*3/MM3 (ref 1.7–7)
NEUTROPHILS NFR BLD AUTO: 69.8 % (ref 42.7–76)
NT-PROBNP SERPL-MCNC: 75.4 PG/ML (ref 5–900)
PLATELET # BLD AUTO: 254 10*3/MM3 (ref 140–450)
PMV BLD AUTO: 10.9 FL (ref 6–12)
POTASSIUM BLD-SCNC: 4.5 MMOL/L (ref 3.5–5.2)
PROT SERPL-MCNC: 7.9 G/DL (ref 6–8.5)
RBC # BLD AUTO: 3.8 10*6/MM3 (ref 3.77–5.28)
SODIUM BLD-SCNC: 144 MMOL/L (ref 136–145)
TROPONIN T SERPL-MCNC: <0.01 NG/ML (ref 0–0.03)
TROPONIN T SERPL-MCNC: <0.01 NG/ML (ref 0–0.03)
TSH SERPL DL<=0.05 MIU/L-ACNC: 1.49 MIU/ML (ref 0.27–4.2)
WBC NRBC COR # BLD: 10.3 10*3/MM3 (ref 3.4–10.8)

## 2019-06-19 PROCEDURE — 85025 COMPLETE CBC W/AUTO DIFF WBC: CPT | Performed by: FAMILY MEDICINE

## 2019-06-19 PROCEDURE — 99284 EMERGENCY DEPT VISIT MOD MDM: CPT

## 2019-06-19 PROCEDURE — 96375 TX/PRO/DX INJ NEW DRUG ADDON: CPT

## 2019-06-19 PROCEDURE — 71045 X-RAY EXAM CHEST 1 VIEW: CPT | Performed by: RADIOLOGY

## 2019-06-19 PROCEDURE — 71045 X-RAY EXAM CHEST 1 VIEW: CPT

## 2019-06-19 PROCEDURE — 86140 C-REACTIVE PROTEIN: CPT | Performed by: FAMILY MEDICINE

## 2019-06-19 PROCEDURE — 80053 COMPREHEN METABOLIC PANEL: CPT | Performed by: FAMILY MEDICINE

## 2019-06-19 PROCEDURE — 25010000002 KETOROLAC TROMETHAMINE PER 15 MG: Performed by: FAMILY MEDICINE

## 2019-06-19 PROCEDURE — 85652 RBC SED RATE AUTOMATED: CPT | Performed by: FAMILY MEDICINE

## 2019-06-19 PROCEDURE — 96374 THER/PROPH/DIAG INJ IV PUSH: CPT

## 2019-06-19 PROCEDURE — 83735 ASSAY OF MAGNESIUM: CPT | Performed by: FAMILY MEDICINE

## 2019-06-19 PROCEDURE — 93010 ELECTROCARDIOGRAM REPORT: CPT | Performed by: INTERNAL MEDICINE

## 2019-06-19 PROCEDURE — 82550 ASSAY OF CK (CPK): CPT | Performed by: FAMILY MEDICINE

## 2019-06-19 PROCEDURE — 25010000002 ORPHENADRINE CITRATE PER 60 MG: Performed by: FAMILY MEDICINE

## 2019-06-19 PROCEDURE — 93005 ELECTROCARDIOGRAM TRACING: CPT | Performed by: FAMILY MEDICINE

## 2019-06-19 PROCEDURE — 25010000002 BUTORPHANOL PER 1 MG: Performed by: FAMILY MEDICINE

## 2019-06-19 PROCEDURE — 83880 ASSAY OF NATRIURETIC PEPTIDE: CPT | Performed by: FAMILY MEDICINE

## 2019-06-19 PROCEDURE — 83874 ASSAY OF MYOGLOBIN: CPT | Performed by: FAMILY MEDICINE

## 2019-06-19 PROCEDURE — 84484 ASSAY OF TROPONIN QUANT: CPT | Performed by: FAMILY MEDICINE

## 2019-06-19 PROCEDURE — 84443 ASSAY THYROID STIM HORMONE: CPT | Performed by: FAMILY MEDICINE

## 2019-06-19 PROCEDURE — 36415 COLL VENOUS BLD VENIPUNCTURE: CPT

## 2019-06-19 RX ORDER — SODIUM CHLORIDE 0.9 % (FLUSH) 0.9 %
10 SYRINGE (ML) INJECTION AS NEEDED
Status: DISCONTINUED | OUTPATIENT
Start: 2019-06-19 | End: 2019-06-19 | Stop reason: HOSPADM

## 2019-06-19 RX ORDER — KETOROLAC TROMETHAMINE 30 MG/ML
30 INJECTION, SOLUTION INTRAMUSCULAR; INTRAVENOUS ONCE
Status: COMPLETED | OUTPATIENT
Start: 2019-06-19 | End: 2019-06-19

## 2019-06-19 RX ORDER — ORPHENADRINE CITRATE 30 MG/ML
60 INJECTION INTRAMUSCULAR; INTRAVENOUS ONCE
Status: COMPLETED | OUTPATIENT
Start: 2019-06-19 | End: 2019-06-19

## 2019-06-19 RX ADMIN — BUTORPHANOL TARTRATE 2 MG: 2 INJECTION, SOLUTION INTRAMUSCULAR; INTRAVENOUS at 15:50

## 2019-06-19 RX ADMIN — KETOROLAC TROMETHAMINE 30 MG: 30 INJECTION, SOLUTION INTRAMUSCULAR; INTRAVENOUS at 14:03

## 2019-06-19 RX ADMIN — ORPHENADRINE CITRATE 60 MG: 30 INJECTION INTRAMUSCULAR; INTRAVENOUS at 15:51

## 2019-06-20 ENCOUNTER — APPOINTMENT (OUTPATIENT)
Dept: CT IMAGING | Facility: HOSPITAL | Age: 69
End: 2019-06-20

## 2019-06-20 ENCOUNTER — APPOINTMENT (OUTPATIENT)
Dept: GENERAL RADIOLOGY | Facility: HOSPITAL | Age: 69
End: 2019-06-20

## 2019-06-20 ENCOUNTER — HOSPITAL ENCOUNTER (EMERGENCY)
Facility: HOSPITAL | Age: 69
Discharge: HOME | End: 2019-06-20
Attending: FAMILY MEDICINE | Admitting: FAMILY MEDICINE

## 2019-06-20 VITALS
SYSTOLIC BLOOD PRESSURE: 166 MMHG | DIASTOLIC BLOOD PRESSURE: 84 MMHG | TEMPERATURE: 97.5 F | RESPIRATION RATE: 18 BRPM | HEIGHT: 67 IN | HEART RATE: 70 BPM | OXYGEN SATURATION: 100 % | BODY MASS INDEX: 32.8 KG/M2 | WEIGHT: 209 LBS

## 2019-06-20 DIAGNOSIS — G20 PARKINSON DISEASE (HCC): ICD-10-CM

## 2019-06-20 DIAGNOSIS — F41.9 ANXIETY: Primary | ICD-10-CM

## 2019-06-20 LAB
ALBUMIN SERPL-MCNC: 4.07 G/DL (ref 3.5–5.2)
ALBUMIN/GLOB SERPL: 1.3 G/DL
ALP SERPL-CCNC: 72 U/L (ref 39–117)
ALT SERPL W P-5'-P-CCNC: 9 U/L (ref 1–33)
ANION GAP SERPL CALCULATED.3IONS-SCNC: 15.6 MMOL/L
APTT PPP: 30.2 SECONDS (ref 23.8–36.1)
AST SERPL-CCNC: 16 U/L (ref 1–32)
BASOPHILS # BLD AUTO: 0.04 10*3/MM3 (ref 0–0.2)
BASOPHILS NFR BLD AUTO: 0.6 % (ref 0–1.5)
BILIRUB SERPL-MCNC: 0.2 MG/DL (ref 0.2–1.2)
BUN BLD-MCNC: 32 MG/DL (ref 8–23)
BUN/CREAT SERPL: 21.1 (ref 7–25)
CALCIUM SPEC-SCNC: 9.8 MG/DL (ref 8.6–10.5)
CHLORIDE SERPL-SCNC: 106 MMOL/L (ref 98–107)
CK SERPL-CCNC: 55 U/L (ref 20–180)
CO2 SERPL-SCNC: 21.4 MMOL/L (ref 22–29)
CREAT BLD-MCNC: 1.52 MG/DL (ref 0.57–1)
CRP SERPL-MCNC: 0.18 MG/DL (ref 0–0.5)
DEPRECATED RDW RBC AUTO: 48 FL (ref 37–54)
EOSINOPHIL # BLD AUTO: 0.1 10*3/MM3 (ref 0–0.4)
EOSINOPHIL NFR BLD AUTO: 1.5 % (ref 0.3–6.2)
ERYTHROCYTE [DISTWIDTH] IN BLOOD BY AUTOMATED COUNT: 14.9 % (ref 12.3–15.4)
GFR SERPL CREATININE-BSD FRML MDRD: 34 ML/MIN/1.73
GLOBULIN UR ELPH-MCNC: 3 GM/DL
GLUCOSE BLD-MCNC: 90 MG/DL (ref 65–99)
HCT VFR BLD AUTO: 34.3 % (ref 34–46.6)
HGB BLD-MCNC: 10.5 G/DL (ref 12–15.9)
IMM GRANULOCYTES # BLD AUTO: 0.01 10*3/MM3 (ref 0–0.05)
IMM GRANULOCYTES NFR BLD AUTO: 0.1 % (ref 0–0.5)
INR PPP: 1.14 (ref 0.9–1.1)
LYMPHOCYTES # BLD AUTO: 1.97 10*3/MM3 (ref 0.7–3.1)
LYMPHOCYTES NFR BLD AUTO: 28.9 % (ref 19.6–45.3)
MAGNESIUM SERPL-MCNC: 2.1 MG/DL (ref 1.6–2.4)
MCH RBC QN AUTO: 28 PG (ref 26.6–33)
MCHC RBC AUTO-ENTMCNC: 30.6 G/DL (ref 31.5–35.7)
MCV RBC AUTO: 91.5 FL (ref 79–97)
MONOCYTES # BLD AUTO: 0.39 10*3/MM3 (ref 0.1–0.9)
MONOCYTES NFR BLD AUTO: 5.7 % (ref 5–12)
NEUTROPHILS # BLD AUTO: 4.3 10*3/MM3 (ref 1.7–7)
NEUTROPHILS NFR BLD AUTO: 63.2 % (ref 42.7–76)
PLATELET # BLD AUTO: 235 10*3/MM3 (ref 140–450)
PMV BLD AUTO: 10.6 FL (ref 6–12)
POTASSIUM BLD-SCNC: 4.4 MMOL/L (ref 3.5–5.2)
PROT SERPL-MCNC: 7.1 G/DL (ref 6–8.5)
PROTHROMBIN TIME: 15.2 SECONDS (ref 11–15.4)
RBC # BLD AUTO: 3.75 10*6/MM3 (ref 3.77–5.28)
SODIUM BLD-SCNC: 143 MMOL/L (ref 136–145)
TSH SERPL DL<=0.05 MIU/L-ACNC: 0.98 MIU/ML (ref 0.27–4.2)
WBC NRBC COR # BLD: 6.81 10*3/MM3 (ref 3.4–10.8)

## 2019-06-20 PROCEDURE — 70450 CT HEAD/BRAIN W/O DYE: CPT

## 2019-06-20 PROCEDURE — 85730 THROMBOPLASTIN TIME PARTIAL: CPT | Performed by: FAMILY MEDICINE

## 2019-06-20 PROCEDURE — 86140 C-REACTIVE PROTEIN: CPT | Performed by: FAMILY MEDICINE

## 2019-06-20 PROCEDURE — 71045 X-RAY EXAM CHEST 1 VIEW: CPT | Performed by: RADIOLOGY

## 2019-06-20 PROCEDURE — 71045 X-RAY EXAM CHEST 1 VIEW: CPT

## 2019-06-20 PROCEDURE — 83735 ASSAY OF MAGNESIUM: CPT | Performed by: FAMILY MEDICINE

## 2019-06-20 PROCEDURE — 25010000002 LORAZEPAM PER 2 MG: Performed by: FAMILY MEDICINE

## 2019-06-20 PROCEDURE — 84443 ASSAY THYROID STIM HORMONE: CPT | Performed by: FAMILY MEDICINE

## 2019-06-20 PROCEDURE — 85610 PROTHROMBIN TIME: CPT | Performed by: FAMILY MEDICINE

## 2019-06-20 PROCEDURE — 96372 THER/PROPH/DIAG INJ SC/IM: CPT

## 2019-06-20 PROCEDURE — 70450 CT HEAD/BRAIN W/O DYE: CPT | Performed by: RADIOLOGY

## 2019-06-20 PROCEDURE — 85025 COMPLETE CBC W/AUTO DIFF WBC: CPT | Performed by: FAMILY MEDICINE

## 2019-06-20 PROCEDURE — 80053 COMPREHEN METABOLIC PANEL: CPT | Performed by: FAMILY MEDICINE

## 2019-06-20 PROCEDURE — 82550 ASSAY OF CK (CPK): CPT | Performed by: FAMILY MEDICINE

## 2019-06-20 PROCEDURE — 99284 EMERGENCY DEPT VISIT MOD MDM: CPT

## 2019-06-20 RX ORDER — LORAZEPAM 2 MG/ML
1 INJECTION INTRAMUSCULAR ONCE
Status: COMPLETED | OUTPATIENT
Start: 2019-06-20 | End: 2019-06-20

## 2019-06-20 RX ADMIN — LORAZEPAM 1 MG: 2 INJECTION INTRAMUSCULAR; INTRAVENOUS at 15:34

## 2019-06-20 RX ADMIN — LORAZEPAM 1 MG: 2 INJECTION INTRAMUSCULAR; INTRAVENOUS at 13:53

## 2019-08-02 PROBLEM — R06.00 DYSPNEA: Status: ACTIVE | Noted: 2018-07-23

## 2019-08-02 PROBLEM — G20 PARKINSON'S DISEASE (HCC): Status: ACTIVE | Noted: 2017-10-31

## 2019-08-02 PROBLEM — E87.6 DIURETIC-INDUCED HYPOKALEMIA: Status: ACTIVE | Noted: 2018-07-23

## 2019-08-02 PROBLEM — N17.9 AKI (ACUTE KIDNEY INJURY) (HCC): Status: ACTIVE | Noted: 2017-05-21

## 2019-08-02 PROBLEM — Z79.01 CHRONIC ANTICOAGULATION: Status: ACTIVE | Noted: 2017-05-21

## 2019-08-02 PROBLEM — M54.42 CHRONIC LEFT-SIDED LOW BACK PAIN WITH LEFT-SIDED SCIATICA: Status: ACTIVE | Noted: 2018-06-24

## 2019-08-02 PROBLEM — T50.2X5A DIURETIC-INDUCED HYPOKALEMIA: Status: ACTIVE | Noted: 2018-07-23

## 2019-08-02 PROBLEM — I44.1 AV BLOCK, 2ND DEGREE: Status: ACTIVE | Noted: 2019-04-24

## 2019-08-02 PROBLEM — G89.29 CHRONIC LEFT-SIDED LOW BACK PAIN WITH LEFT-SIDED SCIATICA: Status: ACTIVE | Noted: 2018-06-24

## 2019-08-02 PROBLEM — G93.40 ENCEPHALOPATHY: Status: ACTIVE | Noted: 2018-07-13

## 2019-08-02 RX ORDER — NITROGLYCERIN 0.4 MG/1
0.4 TABLET SUBLINGUAL
COMMUNITY
Start: 2014-06-17

## 2019-08-02 RX ORDER — GABAPENTIN 600 MG/1
800 TABLET ORAL 3 TIMES DAILY
COMMUNITY

## 2019-08-02 RX ORDER — ALBUTEROL SULFATE 90 MCG
2 HFA AEROSOL WITH ADAPTER (GRAM) INHALATION
COMMUNITY
Start: 2018-11-08

## 2019-08-02 RX ORDER — VALSARTAN 320 MG/1
320 TABLET ORAL DAILY
COMMUNITY
Start: 2015-03-22

## 2019-08-02 RX ORDER — CARVEDILOL 12.5 MG/1
12.5 TABLET ORAL 2 TIMES DAILY WITH MEALS
COMMUNITY
Start: 2018-11-05

## 2019-08-02 RX ORDER — POTASSIUM CHLORIDE 750 MG/1
10 TABLET, FILM COATED, EXTENDED RELEASE ORAL
COMMUNITY
Start: 2018-10-08

## 2019-08-02 RX ORDER — CLOPIDOGREL BISULFATE 75 MG/1
75 TABLET ORAL
COMMUNITY

## 2019-08-02 RX ORDER — PANTOPRAZOLE SODIUM 20 MG/1
20 TABLET, DELAYED RELEASE ORAL DAILY
COMMUNITY

## 2019-08-02 RX ORDER — ZOLPIDEM TARTRATE 10 MG/1
TABLET ORAL
COMMUNITY
End: 2019-08-06 | Stop reason: ALTCHOICE

## 2019-08-02 RX ORDER — CLONIDINE HYDROCHLORIDE 0.2 MG/1
0.2 TABLET ORAL
COMMUNITY
Start: 2015-03-22

## 2019-08-02 RX ORDER — DONEPEZIL HYDROCHLORIDE 10 MG/1
10 TABLET, FILM COATED ORAL NIGHTLY
COMMUNITY
Start: 2018-10-08

## 2019-08-02 RX ORDER — WARFARIN SODIUM 1 MG/1
4 TABLET ORAL
COMMUNITY
Start: 2018-09-12 | End: 2021-02-08 | Stop reason: HOSPADM

## 2019-08-02 RX ORDER — PAROXETINE HYDROCHLORIDE 20 MG/1
20 TABLET, FILM COATED ORAL EVERY MORNING
COMMUNITY

## 2019-08-02 RX ORDER — FUROSEMIDE 20 MG/1
20 TABLET ORAL DAILY
COMMUNITY

## 2019-08-02 RX ORDER — BACLOFEN 20 MG/1
20 TABLET ORAL
COMMUNITY

## 2019-08-02 RX ORDER — HYDRALAZINE HYDROCHLORIDE 25 MG/1
25 TABLET, FILM COATED ORAL 3 TIMES DAILY
COMMUNITY
Start: 2019-04-25 | End: 2021-01-27

## 2019-08-02 RX ORDER — AMLODIPINE BESYLATE 10 MG/1
10 TABLET ORAL EVERY EVENING
COMMUNITY
Start: 2015-03-22 | End: 2021-01-27 | Stop reason: SDUPTHER

## 2019-08-02 RX ORDER — ONDANSETRON 4 MG/1
4 TABLET, ORALLY DISINTEGRATING ORAL
COMMUNITY
Start: 2018-11-08

## 2019-08-02 RX ORDER — QUETIAPINE FUMARATE 100 MG/1
100 TABLET, FILM COATED ORAL 2 TIMES DAILY
COMMUNITY
Start: 2018-10-10

## 2019-08-06 ENCOUNTER — OFFICE VISIT (OUTPATIENT)
Dept: CARDIOLOGY | Facility: CLINIC | Age: 69
End: 2019-08-06

## 2019-08-06 VITALS
HEIGHT: 67 IN | HEART RATE: 62 BPM | DIASTOLIC BLOOD PRESSURE: 65 MMHG | BODY MASS INDEX: 32.96 KG/M2 | OXYGEN SATURATION: 98 % | WEIGHT: 210 LBS | SYSTOLIC BLOOD PRESSURE: 122 MMHG

## 2019-08-06 DIAGNOSIS — Z79.01 CHRONIC ANTICOAGULATION: ICD-10-CM

## 2019-08-06 DIAGNOSIS — R07.2 PRECORDIAL PAIN: ICD-10-CM

## 2019-08-06 DIAGNOSIS — R06.09 DYSPNEA ON EXERTION: ICD-10-CM

## 2019-08-06 DIAGNOSIS — I10 ESSENTIAL HYPERTENSION: ICD-10-CM

## 2019-08-06 DIAGNOSIS — I25.10 ASCVD (ARTERIOSCLEROTIC CARDIOVASCULAR DISEASE): Primary | ICD-10-CM

## 2019-08-06 DIAGNOSIS — N18.30 CKD (CHRONIC KIDNEY DISEASE) STAGE 3, GFR 30-59 ML/MIN (HCC): ICD-10-CM

## 2019-08-06 DIAGNOSIS — I48.0 PAROXYSMAL ATRIAL FIBRILLATION (HCC): ICD-10-CM

## 2019-08-06 DIAGNOSIS — G20 PARKINSON'S DISEASE (HCC): ICD-10-CM

## 2019-08-06 PROCEDURE — 99204 OFFICE O/P NEW MOD 45 MIN: CPT | Performed by: INTERNAL MEDICINE

## 2019-08-06 PROCEDURE — 93000 ELECTROCARDIOGRAM COMPLETE: CPT | Performed by: INTERNAL MEDICINE

## 2019-08-06 RX ORDER — ATORVASTATIN CALCIUM 20 MG/1
20 TABLET, FILM COATED ORAL DAILY
Qty: 30 TABLET | Refills: 11 | Status: SHIPPED | OUTPATIENT
Start: 2019-08-06

## 2019-08-06 RX ORDER — MELATONIN 5 MG
TABLET,CHEWABLE ORAL
COMMUNITY

## 2019-08-06 RX ORDER — HYDROCODONE BITARTRATE AND ACETAMINOPHEN 10; 325 MG/1; MG/1
1 TABLET ORAL EVERY 6 HOURS PRN
COMMUNITY

## 2019-08-26 ENCOUNTER — HOSPITAL ENCOUNTER (OUTPATIENT)
Dept: CARDIOLOGY | Facility: HOSPITAL | Age: 69
Discharge: HOME | End: 2019-08-26

## 2019-08-26 ENCOUNTER — HOSPITAL ENCOUNTER (OUTPATIENT)
Dept: NUCLEAR MEDICINE | Facility: HOSPITAL | Age: 69
Discharge: HOME | End: 2019-08-26

## 2019-08-26 DIAGNOSIS — R07.2 PRECORDIAL PAIN: ICD-10-CM

## 2019-08-26 LAB
BH CV NUCLEAR PRIOR STUDY: 3
BH CV STRESS BP STAGE 1: NORMAL
BH CV STRESS BP STAGE 2: NORMAL
BH CV STRESS COMMENTS STAGE 1: NORMAL
BH CV STRESS COMMENTS STAGE 2: NORMAL
BH CV STRESS DOSE REGADENOSON STAGE 1: 0.4
BH CV STRESS DURATION MIN STAGE 1: 0
BH CV STRESS DURATION MIN STAGE 2: 4
BH CV STRESS DURATION SEC STAGE 1: 10
BH CV STRESS DURATION SEC STAGE 2: 0
BH CV STRESS HR STAGE 1: 93
BH CV STRESS HR STAGE 2: 78
BH CV STRESS PROTOCOL 1: NORMAL
BH CV STRESS RECOVERY BP: NORMAL MMHG
BH CV STRESS RECOVERY HR: 78 BPM
BH CV STRESS STAGE 1: 1
BH CV STRESS STAGE 2: 2
MAXIMAL PREDICTED HEART RATE: 151 BPM
PERCENT MAX PREDICTED HR: 61.59 %
STRESS BASELINE BP: NORMAL MMHG
STRESS BASELINE HR: 71 BPM
STRESS PERCENT HR: 72 %
STRESS POST PEAK BP: NORMAL MMHG
STRESS POST PEAK HR: 93 BPM
STRESS TARGET HR: 128 BPM

## 2019-08-26 PROCEDURE — 0 TECHNETIUM SESTAMIBI: Performed by: INTERNAL MEDICINE

## 2019-08-26 PROCEDURE — 25010000002 REGADENOSON 0.4 MG/5ML SOLUTION: Performed by: INTERNAL MEDICINE

## 2019-08-26 PROCEDURE — A9500 TC99M SESTAMIBI: HCPCS | Performed by: INTERNAL MEDICINE

## 2019-08-26 PROCEDURE — 93018 CV STRESS TEST I&R ONLY: CPT | Performed by: INTERNAL MEDICINE

## 2019-08-26 PROCEDURE — 78452 HT MUSCLE IMAGE SPECT MULT: CPT

## 2019-08-26 PROCEDURE — 78452 HT MUSCLE IMAGE SPECT MULT: CPT | Performed by: INTERNAL MEDICINE

## 2019-08-26 PROCEDURE — 93017 CV STRESS TEST TRACING ONLY: CPT

## 2019-08-26 RX ADMIN — TECHNETIUM TC 99M SESTAMIBI 1 DOSE: 1 INJECTION INTRAVENOUS at 11:10

## 2019-08-26 RX ADMIN — TECHNETIUM TC 99M SESTAMIBI 1 DOSE: 1 INJECTION INTRAVENOUS at 12:22

## 2019-08-26 RX ADMIN — REGADENOSON 0.4 MG: 0.08 INJECTION, SOLUTION INTRAVENOUS at 12:22

## 2019-08-27 ENCOUNTER — OFFICE VISIT (OUTPATIENT)
Dept: CARDIOLOGY | Facility: CLINIC | Age: 69
End: 2019-08-27

## 2019-08-27 VITALS
BODY MASS INDEX: 32.96 KG/M2 | HEIGHT: 67 IN | OXYGEN SATURATION: 98 % | WEIGHT: 210 LBS | DIASTOLIC BLOOD PRESSURE: 71 MMHG | HEART RATE: 71 BPM | SYSTOLIC BLOOD PRESSURE: 159 MMHG

## 2019-08-27 DIAGNOSIS — I73.9 CLAUDICATION (HCC): ICD-10-CM

## 2019-08-27 DIAGNOSIS — I44.1 AV BLOCK, 2ND DEGREE: ICD-10-CM

## 2019-08-27 DIAGNOSIS — I25.10 ASCVD (ARTERIOSCLEROTIC CARDIOVASCULAR DISEASE): Primary | ICD-10-CM

## 2019-08-27 DIAGNOSIS — I48.0 PAROXYSMAL ATRIAL FIBRILLATION (HCC): ICD-10-CM

## 2019-08-27 DIAGNOSIS — I10 ESSENTIAL HYPERTENSION: ICD-10-CM

## 2019-08-27 PROCEDURE — 99213 OFFICE O/P EST LOW 20 MIN: CPT | Performed by: PHYSICIAN ASSISTANT

## 2019-08-27 NOTE — PROGRESS NOTES
"Amrit Orlando MD  Rebecca Hodgson  1950 08/27/2019    Patient Active Problem List   Diagnosis   • Paroxysmal atrial fibrillation (CMS/HCC)   • PATT (acute kidney injury) (CMS/HCC)   • Chest pain   • ASCVD (arteriosclerotic cardiovascular disease)   • AV block, 2nd degree   • Chronic anticoagulation with warfarin.   • Chronic left-sided low back pain with left-sided sciatica   • Cognitive communication deficit   • Disequilibrium   • Diuretic-induced hypokalemia   • Dyspnea   • Encephalopathy   • Essential hypertension   • Parkinson's disease (CMS/HCC)   • CKD (chronic kidney disease) stage 3, GFR 30-59 ml/min (CMS/HCC)       Dear Amrit Orlando MD:    Subjective     History of Present Illness:    Chief Complaint   Patient presents with   • ASCVD     follow up       Rebecca Hodgson is a pleasant 69 y.o. female with a past medical history significant for known coronary artery disease, status post previous multiple percutaneous coronary interventions (at least 5) with the last one performed in 2013 performed in [facility]. She comes in today to discuss results of stress test.     Patient reports that her largest compliant has been shortness of breath on exertion. She reports that when she tries to walk short distances she will be short of breath such as walking from the exam room in our office to her car in the parking lot will leave short of breath to the point to where she needs to rest. (this distance is roughly 150 meters.) she also reports that she has very poor back that she states has been inoperable by multiple orthopedic surgeons that may contribute and lead to deconditioning. Of note she does report that her legs do become \"hard\" to the touch at times but is unsure why and does not know of any association activities that cause this. She does state that at time they feel like ice and tingle. She does state that all of her feet/leg issues including the swelling all started three months ago. " "She state that she has an adjustable bed and states that she incline her head slightly and uses one pillow (roughly 30 degree incline). She denies any PND. She also denies any chest pains.           Stress test on 8/26/2019  Interpretation Summary   · Findings consistent with a normal ECG stress test.  · Myocardial perfusion imaging indicates a normal myocardial perfusion study with no evidence of ischemia.  · Normal LV cavity size. Normal LV wall motion noted.  · Left ventricular ejection fraction is hyperdynamic (Calculated EF > 70%).  · Impressions are consistent with a low risk study.             Allergies   Allergen Reactions   • Bactrim [Sulfamethoxazole-Trimethoprim] Diarrhea   • Codeine Hallucinations   • Morphine Other (See Comments)     Pt states \"my heart stopped\"   • Tetracyclines & Related Hives   :      Current Outpatient Medications:   •  amLODIPine (NORVASC) 10 MG tablet, Take 10 mg by mouth Every Evening., Disp: , Rfl:   •  atorvastatin (LIPITOR) 20 MG tablet, Take 1 tablet by mouth Daily., Disp: 30 tablet, Rfl: 11  •  carbidopa-levodopa (SINEMET)  MG per tablet, Take 1 tablet by mouth 3 (Three) Times a Day., Disp: , Rfl:   •  carvedilol (COREG) 12.5 MG tablet, Take 12.5 mg by mouth 2 (Two) Times a Day With Meals., Disp: , Rfl:   •  CloNIDine (CATAPRES) 0.2 MG tablet, Take 0.2 mg by mouth., Disp: , Rfl:   •  clopidogrel (PLAVIX) 75 MG tablet, Take 75 mg by mouth., Disp: , Rfl:   •  donepezil (ARICEPT) 10 MG tablet, Take 10 mg by mouth Every Night., Disp: , Rfl:   •  furosemide (LASIX) 20 MG tablet, Take 20 mg by mouth Daily., Disp: , Rfl:   •  gabapentin (NEURONTIN) 600 MG tablet, Take 600 mg by mouth 3 (Three) Times a Day., Disp: , Rfl:   •  hydrALAZINE (APRESOLINE) 25 MG tablet, Take 25 mg by mouth 3 (Three) Times a Day., Disp: , Rfl:   •  HYDROcodone-acetaminophen (NORCO)  MG per tablet, Take 1 tablet by mouth Every 6 (Six) Hours As Needed for Moderate Pain ., Disp: , Rfl:   •  " Melatonin 5 MG chewable tablet, Chew., Disp: , Rfl:   •  metFORMIN (GLUCOPHAGE) 500 MG tablet, Take 500 mg by mouth Daily With Breakfast., Disp: , Rfl:   •  nitroglycerin (NITROSTAT) 0.4 MG SL tablet, Place 0.4 mg under the tongue., Disp: , Rfl:   •  pantoprazole (PROTONIX) 20 MG EC tablet, Take 20 mg by mouth Daily., Disp: , Rfl:   •  PARoxetine (PAXIL) 20 MG tablet, Take 20 mg by mouth Every Morning., Disp: , Rfl:   •  potassium chloride (K-DUR) 10 MEQ CR tablet, Take 10 mEq by mouth., Disp: , Rfl:   •  QUEtiapine (SEROquel) 100 MG tablet, Take 100 mg by mouth 2 (Two) Times a Day., Disp: , Rfl:   •  valsartan (DIOVAN) 320 MG tablet, Take 320 mg by mouth Daily., Disp: , Rfl:   •  warfarin (COUMADIN) 1 MG tablet, Take 4 mg by mouth., Disp: , Rfl:   •  albuterol sulfate HFA (PROVENTIL HFA) 108 (90 Base) MCG/ACT inhaler, Inhale 2 puffs., Disp: , Rfl:   •  baclofen (LIORESAL) 20 MG tablet, Take 20 mg by mouth., Disp: , Rfl:   •  LORazepam (ATIVAN) 0.5 MG tablet, Take 1 tablet by mouth Every 8 (Eight) Hours As Needed for Anxiety., Disp: 9 tablet, Rfl: 0  •  ondansetron ODT (ZOFRAN ODT) 4 MG disintegrating tablet, Take 4 mg by mouth., Disp: , Rfl:   •  promethazine (PHENERGAN) 25 MG tablet, Take 1 tablet by mouth Every 6 (Six) Hours As Needed for Nausea or Vomiting., Disp: 15 tablet, Rfl: 0    The following portions of the patient's history were reviewed and updated as appropriate: allergies, current medications, past family history, past medical history, past social history, past surgical history and problem list.    Social History     Tobacco Use   • Smoking status: Never Smoker   Substance Use Topics   • Alcohol use: No     Frequency: Never     Comment: denies   • Drug use: No     Comment: denies       Review of Systems   Constitution: Positive for weakness and malaise/fatigue.   Cardiovascular: Positive for dyspnea on exertion, leg swelling and palpitations. Negative for chest pain, near-syncope and syncope.  "  Respiratory: Positive for shortness of breath.    Hematologic/Lymphatic: Negative for bleeding problem. Does not bruise/bleed easily.   Gastrointestinal: Negative for nausea and vomiting.       Objective   Vitals:    08/27/19 1357   BP: 159/71   Pulse: 71   SpO2: 98%   Weight: 95.3 kg (210 lb)   Height: 170.2 cm (67.01\")     Body mass index is 32.88 kg/m².    Physical Exam   Constitutional: She is oriented to person, place, and time. She appears well-developed and well-nourished. No distress.   HENT:   Head: Normocephalic and atraumatic.   Cardiovascular: Normal rate and regular rhythm.   Murmur heard.   Harsh midsystolic murmur is present with a grade of 2/6 at the upper right sternal border radiating to the neck.  Pulmonary/Chest: Effort normal and breath sounds normal. No respiratory distress.   Musculoskeletal: She exhibits edema ( 1+ pedal edema. ).   Neurological: She is alert and oriented to person, place, and time.   Skin: She is not diaphoretic.     Lab Results   Component Value Date     06/20/2019    K 4.4 06/20/2019     06/20/2019    CO2 21.4 (L) 06/20/2019    BUN 32 (H) 06/20/2019    CREATININE 1.52 (H) 06/20/2019    GLUCOSE 90 06/20/2019    CALCIUM 9.8 06/20/2019    AST 16 06/20/2019    ALT 9 06/20/2019    ALKPHOS 72 06/20/2019     Lab Results   Component Value Date    CKTOTAL 55 06/20/2019     Lab Results   Component Value Date    WBC 6.81 06/20/2019    HGB 10.5 (L) 06/20/2019    HCT 34.3 06/20/2019     06/20/2019     Lab Results   Component Value Date    INR 1.14 (H) 06/20/2019    INR 1.65 04/24/2019    INR 1.74 04/23/2019     Lab Results   Component Value Date    MG 2.1 06/20/2019     Lab Results   Component Value Date    TSH 0.979 06/20/2019      Lab Results   Component Value Date    BNP 77.0 02/28/2019       During this visit the following were done:  Labs Reviewed [x]    Labs Ordered []    Radiology Reports Reviewed [x]    Radiology Ordered []    PCP Records Reviewed []  "   Referring Provider Records Reviewed []    ER Records Reviewed []    Hospital Records Reviewed []    History Obtained From Family []    Radiology Images Reviewed []    Other Reviewed []    Records Requested []       Procedures    Assessment/Plan    Diagnosis Plan   1. ASCVD (arteriosclerotic cardiovascular disease)  Adult Transthoracic Echo Complete W/ Cont if Necessary Per Protocol   2. Paroxysmal atrial fibrillation (CMS/HCC)     3. Essential hypertension     4. AV block, 2nd degree     5. Claudication (CMS/HCC)  US Arterial Doppler Lower Extremity Complete        Recommendations:  1. I will evaluate patient's dyspnea further with an echocardiogram, no valvular disease is seen will switch her from warfarin to a NOAC, preferably eliquis.   2. Since she does describe some symptoms of possible underlying PAD will also check arterial doppler given her history of significant ASCVD.   3. For now continue valsartan, hydralazine, lipitor, amlodipine.     Return in about 4 weeks (around 9/24/2019).    As always, I appreciate very much the opportunity to participate in the cardiovascular care of your patients.      With Best Regards,    Derick De Jesus PA-C

## 2019-10-01 ENCOUNTER — TELEPHONE (OUTPATIENT)
Dept: CARDIOLOGY | Facility: CLINIC | Age: 69
End: 2019-10-01

## 2019-10-01 NOTE — TELEPHONE ENCOUNTER
----- Message from Derick De Jesus PA-C sent at 9/27/2019 12:59 PM EDT -----  Can we call and check to see if she is going to get the echo done? If it is normal we will be able to switch her to eliquis.   ----- Message -----  From: Christiane Loaiza MA  Sent: 8/29/2019   3:55 PM  To: Derick De Jesus PA-C        ----- Message -----  From: Derick De Jesus PA-C  Sent: 8/27/2019   2:42 PM  To: [facility]    Can we check on the cost of eliquis

## 2019-10-03 ENCOUNTER — TELEPHONE (OUTPATIENT)
Dept: CARDIOLOGY | Facility: CLINIC | Age: 69
End: 2019-10-03

## 2019-10-04 ENCOUNTER — TELEPHONE (OUTPATIENT)
Dept: CARDIOLOGY | Facility: CLINIC | Age: 69
End: 2019-10-04

## 2019-10-04 NOTE — TELEPHONE ENCOUNTER
"Attempting to contact patient to inquire as to why she had not had her echo as recommended at last office visit.  These results would have bearing on possible anticoagulation change. Will she reconsider having this test per \"Derick De Jesus\" question.  "

## 2019-10-09 ENCOUNTER — TELEPHONE (OUTPATIENT)
Dept: CARDIOLOGY | Facility: CLINIC | Age: 69
End: 2019-10-09

## 2019-10-30 ENCOUNTER — TELEPHONE (OUTPATIENT)
Dept: CARDIOLOGY | Facility: CLINIC | Age: 69
End: 2019-10-30

## 2019-11-14 ENCOUNTER — APPOINTMENT (OUTPATIENT)
Dept: CT IMAGING | Facility: HOSPITAL | Age: 69
End: 2019-11-14

## 2019-11-14 ENCOUNTER — HOSPITAL ENCOUNTER (EMERGENCY)
Facility: HOSPITAL | Age: 69
Discharge: HOME | End: 2019-11-14
Attending: EMERGENCY MEDICINE | Admitting: EMERGENCY MEDICINE

## 2019-11-14 ENCOUNTER — APPOINTMENT (OUTPATIENT)
Dept: GENERAL RADIOLOGY | Facility: HOSPITAL | Age: 69
End: 2019-11-14

## 2019-11-14 VITALS
WEIGHT: 207 LBS | BODY MASS INDEX: 31.37 KG/M2 | SYSTOLIC BLOOD PRESSURE: 146 MMHG | RESPIRATION RATE: 18 BRPM | HEIGHT: 68 IN | OXYGEN SATURATION: 99 % | HEART RATE: 63 BPM | DIASTOLIC BLOOD PRESSURE: 68 MMHG | TEMPERATURE: 98.1 F

## 2019-11-14 DIAGNOSIS — K57.92 DIVERTICULITIS: Primary | ICD-10-CM

## 2019-11-14 LAB
ALBUMIN SERPL-MCNC: 4.58 G/DL (ref 3.5–5.2)
ALBUMIN/GLOB SERPL: 1.5 G/DL
ALP SERPL-CCNC: 83 U/L (ref 39–117)
ALT SERPL W P-5'-P-CCNC: 14 U/L (ref 1–33)
ANION GAP SERPL CALCULATED.3IONS-SCNC: 16.8 MMOL/L (ref 5–15)
APTT PPP: 30.4 SECONDS (ref 23.8–36.1)
AST SERPL-CCNC: 27 U/L (ref 1–32)
BASOPHILS # BLD AUTO: 0.04 10*3/MM3 (ref 0–0.2)
BASOPHILS NFR BLD AUTO: 0.6 % (ref 0–1.5)
BILIRUB SERPL-MCNC: 0.4 MG/DL (ref 0.2–1.2)
BILIRUB UR QL STRIP: NEGATIVE
BUN BLD-MCNC: 15 MG/DL (ref 8–23)
BUN/CREAT SERPL: 16.9 (ref 7–25)
CALCIUM SPEC-SCNC: 10.3 MG/DL (ref 8.6–10.5)
CHLORIDE SERPL-SCNC: 104 MMOL/L (ref 98–107)
CLARITY UR: CLEAR
CO2 SERPL-SCNC: 23.2 MMOL/L (ref 22–29)
COLOR UR: YELLOW
CREAT BLD-MCNC: 0.89 MG/DL (ref 0.57–1)
CRP SERPL-MCNC: 0.18 MG/DL (ref 0–0.5)
DEPRECATED RDW RBC AUTO: 50.3 FL (ref 37–54)
EOSINOPHIL # BLD AUTO: 0.1 10*3/MM3 (ref 0–0.4)
EOSINOPHIL NFR BLD AUTO: 1.4 % (ref 0.3–6.2)
ERYTHROCYTE [DISTWIDTH] IN BLOOD BY AUTOMATED COUNT: 14.8 % (ref 12.3–15.4)
GFR SERPL CREATININE-BSD FRML MDRD: 63 ML/MIN/1.73
GLOBULIN UR ELPH-MCNC: 3.1 GM/DL
GLUCOSE BLD-MCNC: 96 MG/DL (ref 65–99)
GLUCOSE BLDC GLUCOMTR-MCNC: 95 MG/DL (ref 70–130)
GLUCOSE UR STRIP-MCNC: NEGATIVE MG/DL
HCT VFR BLD AUTO: 33.7 % (ref 34–46.6)
HGB BLD-MCNC: 10.6 G/DL (ref 12–15.9)
HGB UR QL STRIP.AUTO: NEGATIVE
IMM GRANULOCYTES # BLD AUTO: 0.02 10*3/MM3 (ref 0–0.05)
IMM GRANULOCYTES NFR BLD AUTO: 0.3 % (ref 0–0.5)
INR PPP: 1.14 (ref 0.9–1.1)
KETONES UR QL STRIP: NEGATIVE
LEUKOCYTE ESTERASE UR QL STRIP.AUTO: NEGATIVE
LIPASE SERPL-CCNC: 51 U/L (ref 13–60)
LYMPHOCYTES # BLD AUTO: 1.89 10*3/MM3 (ref 0.7–3.1)
LYMPHOCYTES NFR BLD AUTO: 26.3 % (ref 19.6–45.3)
MCH RBC QN AUTO: 29 PG (ref 26.6–33)
MCHC RBC AUTO-ENTMCNC: 31.5 G/DL (ref 31.5–35.7)
MCV RBC AUTO: 92.1 FL (ref 79–97)
MONOCYTES # BLD AUTO: 0.47 10*3/MM3 (ref 0.1–0.9)
MONOCYTES NFR BLD AUTO: 6.5 % (ref 5–12)
NEUTROPHILS # BLD AUTO: 4.66 10*3/MM3 (ref 1.7–7)
NEUTROPHILS NFR BLD AUTO: 64.9 % (ref 42.7–76)
NITRITE UR QL STRIP: NEGATIVE
NRBC BLD AUTO-RTO: 0 /100 WBC (ref 0–0.2)
PH UR STRIP.AUTO: <=5 [PH] (ref 5–8)
PLATELET # BLD AUTO: 219 10*3/MM3 (ref 140–450)
PMV BLD AUTO: 10.1 FL (ref 6–12)
POTASSIUM BLD-SCNC: 4 MMOL/L (ref 3.5–5.2)
PROT SERPL-MCNC: 7.7 G/DL (ref 6–8.5)
PROT UR QL STRIP: NEGATIVE
PROTHROMBIN TIME: 15.1 SECONDS (ref 11–15.4)
RBC # BLD AUTO: 3.66 10*6/MM3 (ref 3.77–5.28)
SODIUM BLD-SCNC: 144 MMOL/L (ref 136–145)
SP GR UR STRIP: 1.02 (ref 1–1.03)
TROPONIN T SERPL-MCNC: <0.01 NG/ML (ref 0–0.03)
UROBILINOGEN UR QL STRIP: NORMAL
WBC NRBC COR # BLD: 7.18 10*3/MM3 (ref 3.4–10.8)

## 2019-11-14 PROCEDURE — 85730 THROMBOPLASTIN TIME PARTIAL: CPT | Performed by: PHYSICIAN ASSISTANT

## 2019-11-14 PROCEDURE — 82962 GLUCOSE BLOOD TEST: CPT

## 2019-11-14 PROCEDURE — 74177 CT ABD & PELVIS W/CONTRAST: CPT | Performed by: RADIOLOGY

## 2019-11-14 PROCEDURE — 85025 COMPLETE CBC W/AUTO DIFF WBC: CPT | Performed by: PHYSICIAN ASSISTANT

## 2019-11-14 PROCEDURE — 71045 X-RAY EXAM CHEST 1 VIEW: CPT | Performed by: RADIOLOGY

## 2019-11-14 PROCEDURE — 86140 C-REACTIVE PROTEIN: CPT | Performed by: PHYSICIAN ASSISTANT

## 2019-11-14 PROCEDURE — 84484 ASSAY OF TROPONIN QUANT: CPT | Performed by: PHYSICIAN ASSISTANT

## 2019-11-14 PROCEDURE — 99283 EMERGENCY DEPT VISIT LOW MDM: CPT

## 2019-11-14 PROCEDURE — 93010 ELECTROCARDIOGRAM REPORT: CPT | Performed by: INTERNAL MEDICINE

## 2019-11-14 PROCEDURE — 81003 URINALYSIS AUTO W/O SCOPE: CPT | Performed by: PHYSICIAN ASSISTANT

## 2019-11-14 PROCEDURE — 0 IOVERSOL 68 % SOLUTION: Performed by: EMERGENCY MEDICINE

## 2019-11-14 PROCEDURE — 93005 ELECTROCARDIOGRAM TRACING: CPT | Performed by: PHYSICIAN ASSISTANT

## 2019-11-14 PROCEDURE — 71045 X-RAY EXAM CHEST 1 VIEW: CPT

## 2019-11-14 PROCEDURE — 96375 TX/PRO/DX INJ NEW DRUG ADDON: CPT

## 2019-11-14 PROCEDURE — 74177 CT ABD & PELVIS W/CONTRAST: CPT

## 2019-11-14 PROCEDURE — 25010000002 ONDANSETRON PER 1 MG: Performed by: PHYSICIAN ASSISTANT

## 2019-11-14 PROCEDURE — 83690 ASSAY OF LIPASE: CPT | Performed by: PHYSICIAN ASSISTANT

## 2019-11-14 PROCEDURE — 80053 COMPREHEN METABOLIC PANEL: CPT | Performed by: PHYSICIAN ASSISTANT

## 2019-11-14 PROCEDURE — 96365 THER/PROPH/DIAG IV INF INIT: CPT

## 2019-11-14 PROCEDURE — 25010000002 BUTORPHANOL PER 1 MG: Performed by: EMERGENCY MEDICINE

## 2019-11-14 PROCEDURE — 85610 PROTHROMBIN TIME: CPT | Performed by: PHYSICIAN ASSISTANT

## 2019-11-14 PROCEDURE — 25010000002 KETOROLAC TROMETHAMINE PER 15 MG: Performed by: EMERGENCY MEDICINE

## 2019-11-14 RX ORDER — KETOROLAC TROMETHAMINE 30 MG/ML
30 INJECTION, SOLUTION INTRAMUSCULAR; INTRAVENOUS ONCE
Status: COMPLETED | OUTPATIENT
Start: 2019-11-14 | End: 2019-11-14

## 2019-11-14 RX ORDER — SODIUM CHLORIDE 0.9 % (FLUSH) 0.9 %
10 SYRINGE (ML) INJECTION AS NEEDED
Status: DISCONTINUED | OUTPATIENT
Start: 2019-11-14 | End: 2019-11-14 | Stop reason: HOSPADM

## 2019-11-14 RX ORDER — ONDANSETRON 2 MG/ML
4 INJECTION INTRAMUSCULAR; INTRAVENOUS ONCE
Status: COMPLETED | OUTPATIENT
Start: 2019-11-14 | End: 2019-11-14

## 2019-11-14 RX ORDER — AMOXICILLIN AND CLAVULANATE POTASSIUM 875; 125 MG/1; MG/1
1 TABLET, FILM COATED ORAL 2 TIMES DAILY
Qty: 20 TABLET | Refills: 0 | Status: SHIPPED | OUTPATIENT
Start: 2019-11-14

## 2019-11-14 RX ADMIN — ONDANSETRON 4 MG: 2 INJECTION, SOLUTION INTRAMUSCULAR; INTRAVENOUS at 15:27

## 2019-11-14 RX ADMIN — IOVERSOL 100 ML: 678 INJECTION INTRA-ARTERIAL; INTRAVENOUS at 17:05

## 2019-11-14 RX ADMIN — KETOROLAC TROMETHAMINE 30 MG: 30 INJECTION, SOLUTION INTRAMUSCULAR; INTRAVENOUS at 16:27

## 2019-11-14 RX ADMIN — BUTORPHANOL TARTRATE 2 MG: 2 INJECTION, SOLUTION INTRAMUSCULAR; INTRAVENOUS at 16:28

## 2019-11-14 RX ADMIN — METRONIDAZOLE 500 MG: 500 INJECTION, SOLUTION INTRAVENOUS at 18:32

## 2019-11-14 RX ADMIN — SODIUM CHLORIDE 500 ML: 9 INJECTION, SOLUTION INTRAVENOUS at 15:27

## 2019-11-14 NOTE — ED PROVIDER NOTES
"Subjective     History provided by:  Patient   used: No    Abdominal Pain   Pain location:  LLQ  Pain quality: cramping    Pain radiates to:  Groin, back and epigastric region  Pain severity:  Moderate  Duration:  2 days  Timing:  Intermittent  Progression:  Worsening  Chronicity:  New  Context: previous surgery    Relieved by:  Nothing  Worsened by:  Eating  Ineffective treatments: Gabapentin.  Associated symptoms: diarrhea, nausea and vomiting    Associated symptoms: no chest pain, no dysuria, no fever, no hematemesis, no hematochezia and no hematuria    Risk factors: being elderly and multiple surgeries    Risk factors comment:  Hx of diverticulitis      Review of Systems   Constitutional: Negative.  Negative for fever.   HENT: Negative.    Respiratory: Negative.  Negative for chest tightness.    Cardiovascular: Negative.  Negative for chest pain.   Gastrointestinal: Positive for abdominal pain, diarrhea, nausea and vomiting. Negative for hematemesis and hematochezia.   Endocrine: Negative.    Genitourinary: Negative.  Negative for dysuria and hematuria.   Skin: Negative.    Neurological: Negative.    Psychiatric/Behavioral: Negative.    All other systems reviewed and are negative.      Past Medical History:   Diagnosis Date   • ASCVD (arteriosclerotic cardiovascular disease)    • Diabetes (CMS/HCC)    • Diverticulosis    • Essential hypertension    • Injury of back    • Parkinson disease (CMS/HCC)        Allergies   Allergen Reactions   • Bactrim [Sulfamethoxazole-Trimethoprim] Diarrhea   • Codeine Hallucinations   • Morphine Other (See Comments)     Pt states \"my heart stopped\"   • Tetracyclines & Related Hives       Past Surgical History:   Procedure Laterality Date   • APPENDECTOMY     • BREAST SURGERY     • CHOLECYSTECTOMY     • CORONARY ANGIOPLASTY WITH STENT PLACEMENT     • HYSTERECTOMY         Family History   Problem Relation Age of Onset   • Heart disease Mother    • Heart disease " Father        Social History     Socioeconomic History   • Marital status:      Spouse name: Not on file   • Number of children: Not on file   • Years of education: Not on file   • Highest education level: Not on file   Occupational History   • Occupation:    Tobacco Use   • Smoking status: Never Smoker   Substance and Sexual Activity   • Alcohol use: No     Frequency: Never     Comment: denies   • Drug use: No     Comment: denies   • Sexual activity: Defer           Objective   Physical Exam   Constitutional: She is oriented to person, place, and time. She appears well-developed and well-nourished. No distress.   HENT:   Head: Normocephalic and atraumatic.   Right Ear: External ear normal.   Left Ear: External ear normal.   Nose: Nose normal.   Eyes: Conjunctivae and EOM are normal. Pupils are equal, round, and reactive to light.   Neck: Normal range of motion. Neck supple. No JVD present. No tracheal deviation present.   Cardiovascular: Normal rate, regular rhythm and normal heart sounds.   No murmur heard.  Pulmonary/Chest: Effort normal and breath sounds normal. No respiratory distress. She has no wheezes.   Abdominal: Soft. Normal appearance and bowel sounds are normal. She exhibits no distension and no ascites. There is tenderness in the left lower quadrant. There is no CVA tenderness.   Musculoskeletal: Normal range of motion. She exhibits no edema or deformity.   Neurological: She is alert and oriented to person, place, and time. No cranial nerve deficit.   Skin: Skin is warm and dry. No rash noted. She is not diaphoretic. No erythema. No pallor.   Psychiatric: She has a normal mood and affect. Her behavior is normal. Thought content normal.   Nursing note and vitals reviewed.      Procedures           ED Course  ED Course as of Nov 14 1939   Thu Nov 14, 2019   1502 Blood glucose 95  [TK]   1506 Per Dr. Noe, normal sinus rhythm with heart rate of 60 bpm.  ME interval 146.  QRS  duration 82.  QTc 448. ECG 12 Lead [TK]   1539 IMPRESSION:   No evidence of active or acute cardiopulmonary disease on today's chest radiograph.    This report was finalized on 11/14/2019 3:34 PM by Dr. Titi Boston MD.  XR Chest 1 View [TK]   1728 IMPRESSION:   Impression:  1. Very mild sigmoid diverticulitis. No perforation or abscess.  2. Other nonacute/incidental findings as above.    This report was finalized on 11/14/2019 5:04 PM by Dr. Guillermo De Leon MD.  CT Abdomen Pelvis With Contrast [TK]   1819 Pt has been given apple juice, currently tolerating. Discussed findings of CT abd with patient.  [TK]   1934 Will dc pt on Augmentin secondary to drug to drug interactions with her Warfarin.  [TK]      ED Course User Index  [TK] Jean-Claude Drew PA-C                  MDM  Number of Diagnoses or Management Options  Diverticulitis: new and requires workup     Amount and/or Complexity of Data Reviewed  Clinical lab tests: reviewed and ordered  Tests in the radiology section of CPT®: reviewed and ordered  Tests in the medicine section of CPT®: reviewed and ordered    Risk of Complications, Morbidity, and/or Mortality  Presenting problems: moderate  Diagnostic procedures: moderate  Management options: moderate    Patient Progress  Patient progress: stable      Final diagnoses:   Diverticulitis              Jean-Claude Drew PA-C  11/14/19 1939

## 2019-11-16 ENCOUNTER — HOSPITAL ENCOUNTER (EMERGENCY)
Facility: HOSPITAL | Age: 69
Discharge: HOME | End: 2019-11-16
Attending: FAMILY MEDICINE | Admitting: FAMILY MEDICINE

## 2019-11-16 ENCOUNTER — APPOINTMENT (OUTPATIENT)
Dept: CT IMAGING | Facility: HOSPITAL | Age: 69
End: 2019-11-16

## 2019-11-16 VITALS
BODY MASS INDEX: 30.31 KG/M2 | HEIGHT: 68 IN | OXYGEN SATURATION: 99 % | RESPIRATION RATE: 18 BRPM | DIASTOLIC BLOOD PRESSURE: 76 MMHG | HEART RATE: 71 BPM | WEIGHT: 200 LBS | SYSTOLIC BLOOD PRESSURE: 154 MMHG | TEMPERATURE: 97.9 F

## 2019-11-16 DIAGNOSIS — K57.92 DIVERTICULITIS: Primary | ICD-10-CM

## 2019-11-16 DIAGNOSIS — R19.7 DIARRHEA, UNSPECIFIED TYPE: ICD-10-CM

## 2019-11-16 DIAGNOSIS — E87.6 HYPOKALEMIA: ICD-10-CM

## 2019-11-16 LAB
ALBUMIN SERPL-MCNC: 4.41 G/DL (ref 3.5–5.2)
ALBUMIN/GLOB SERPL: 1.4 G/DL
ALP SERPL-CCNC: 76 U/L (ref 39–117)
ALT SERPL W P-5'-P-CCNC: 5 U/L (ref 1–33)
ANION GAP SERPL CALCULATED.3IONS-SCNC: 16.5 MMOL/L (ref 5–15)
AST SERPL-CCNC: 33 U/L (ref 1–32)
BASOPHILS # BLD AUTO: 0.06 10*3/MM3 (ref 0–0.2)
BASOPHILS NFR BLD AUTO: 0.6 % (ref 0–1.5)
BILIRUB SERPL-MCNC: 0.3 MG/DL (ref 0.2–1.2)
BUN BLD-MCNC: 12 MG/DL (ref 8–23)
BUN/CREAT SERPL: 12.9 (ref 7–25)
CALCIUM SPEC-SCNC: 9.5 MG/DL (ref 8.6–10.5)
CHLORIDE SERPL-SCNC: 104 MMOL/L (ref 98–107)
CO2 SERPL-SCNC: 21.5 MMOL/L (ref 22–29)
CREAT BLD-MCNC: 0.93 MG/DL (ref 0.57–1)
D-LACTATE SERPL-SCNC: 1.5 MMOL/L (ref 0.5–2)
DEPRECATED RDW RBC AUTO: 48.7 FL (ref 37–54)
EOSINOPHIL # BLD AUTO: 0.08 10*3/MM3 (ref 0–0.4)
EOSINOPHIL NFR BLD AUTO: 0.8 % (ref 0.3–6.2)
ERYTHROCYTE [DISTWIDTH] IN BLOOD BY AUTOMATED COUNT: 14.6 % (ref 12.3–15.4)
GFR SERPL CREATININE-BSD FRML MDRD: 60 ML/MIN/1.73
GLOBULIN UR ELPH-MCNC: 3.2 GM/DL
GLUCOSE BLD-MCNC: 94 MG/DL (ref 65–99)
HCT VFR BLD AUTO: 34 % (ref 34–46.6)
HGB BLD-MCNC: 10.8 G/DL (ref 12–15.9)
HOLD SPECIMEN: NORMAL
HOLD SPECIMEN: NORMAL
IMM GRANULOCYTES # BLD AUTO: 0.04 10*3/MM3 (ref 0–0.05)
IMM GRANULOCYTES NFR BLD AUTO: 0.4 % (ref 0–0.5)
INR PPP: 1.34 (ref 0.9–1.1)
LIPASE SERPL-CCNC: 25 U/L (ref 13–60)
LYMPHOCYTES # BLD AUTO: 1.7 10*3/MM3 (ref 0.7–3.1)
LYMPHOCYTES NFR BLD AUTO: 16.9 % (ref 19.6–45.3)
MCH RBC QN AUTO: 29 PG (ref 26.6–33)
MCHC RBC AUTO-ENTMCNC: 31.8 G/DL (ref 31.5–35.7)
MCV RBC AUTO: 91.2 FL (ref 79–97)
MONOCYTES # BLD AUTO: 0.57 10*3/MM3 (ref 0.1–0.9)
MONOCYTES NFR BLD AUTO: 5.7 % (ref 5–12)
NEUTROPHILS # BLD AUTO: 7.58 10*3/MM3 (ref 1.7–7)
NEUTROPHILS NFR BLD AUTO: 75.6 % (ref 42.7–76)
NRBC BLD AUTO-RTO: 0 /100 WBC (ref 0–0.2)
PLATELET # BLD AUTO: 227 10*3/MM3 (ref 140–450)
PMV BLD AUTO: 10.3 FL (ref 6–12)
POTASSIUM BLD-SCNC: 3.2 MMOL/L (ref 3.5–5.2)
PROT SERPL-MCNC: 7.6 G/DL (ref 6–8.5)
PROTHROMBIN TIME: 17.2 SECONDS (ref 11–15.4)
RBC # BLD AUTO: 3.73 10*6/MM3 (ref 3.77–5.28)
SODIUM BLD-SCNC: 142 MMOL/L (ref 136–145)
WBC NRBC COR # BLD: 10.03 10*3/MM3 (ref 3.4–10.8)
WHOLE BLOOD HOLD SPECIMEN: NORMAL
WHOLE BLOOD HOLD SPECIMEN: NORMAL

## 2019-11-16 PROCEDURE — 74177 CT ABD & PELVIS W/CONTRAST: CPT

## 2019-11-16 PROCEDURE — 87040 BLOOD CULTURE FOR BACTERIA: CPT | Performed by: FAMILY MEDICINE

## 2019-11-16 PROCEDURE — 99284 EMERGENCY DEPT VISIT MOD MDM: CPT

## 2019-11-16 PROCEDURE — 25010000002 BUTORPHANOL PER 1 MG: Performed by: FAMILY MEDICINE

## 2019-11-16 PROCEDURE — 85025 COMPLETE CBC W/AUTO DIFF WBC: CPT | Performed by: FAMILY MEDICINE

## 2019-11-16 PROCEDURE — 83605 ASSAY OF LACTIC ACID: CPT | Performed by: FAMILY MEDICINE

## 2019-11-16 PROCEDURE — 85610 PROTHROMBIN TIME: CPT | Performed by: FAMILY MEDICINE

## 2019-11-16 PROCEDURE — 96376 TX/PRO/DX INJ SAME DRUG ADON: CPT

## 2019-11-16 PROCEDURE — 96374 THER/PROPH/DIAG INJ IV PUSH: CPT

## 2019-11-16 PROCEDURE — 80053 COMPREHEN METABOLIC PANEL: CPT | Performed by: FAMILY MEDICINE

## 2019-11-16 PROCEDURE — 0 IOVERSOL 68 % SOLUTION: Performed by: FAMILY MEDICINE

## 2019-11-16 PROCEDURE — 83690 ASSAY OF LIPASE: CPT | Performed by: FAMILY MEDICINE

## 2019-11-16 RX ORDER — BUTORPHANOL TARTRATE 1 MG/ML
1 INJECTION, SOLUTION INTRAMUSCULAR; INTRAVENOUS ONCE
Status: COMPLETED | OUTPATIENT
Start: 2019-11-16 | End: 2019-11-16

## 2019-11-16 RX ORDER — POTASSIUM CHLORIDE 20 MEQ/1
40 TABLET, EXTENDED RELEASE ORAL ONCE
Status: COMPLETED | OUTPATIENT
Start: 2019-11-16 | End: 2019-11-16

## 2019-11-16 RX ORDER — METRONIDAZOLE 500 MG/1
500 TABLET ORAL 3 TIMES DAILY
Qty: 21 TABLET | Refills: 0 | Status: SHIPPED | OUTPATIENT
Start: 2019-11-16

## 2019-11-16 RX ORDER — SODIUM CHLORIDE 0.9 % (FLUSH) 0.9 %
10 SYRINGE (ML) INJECTION AS NEEDED
Status: DISCONTINUED | OUTPATIENT
Start: 2019-11-16 | End: 2019-11-16 | Stop reason: HOSPADM

## 2019-11-16 RX ORDER — METRONIDAZOLE 250 MG/1
500 TABLET ORAL ONCE
Status: COMPLETED | OUTPATIENT
Start: 2019-11-16 | End: 2019-11-16

## 2019-11-16 RX ADMIN — POTASSIUM CHLORIDE 40 MEQ: 1500 TABLET, EXTENDED RELEASE ORAL at 18:36

## 2019-11-16 RX ADMIN — SODIUM CHLORIDE 1000 ML: 9 INJECTION, SOLUTION INTRAVENOUS at 17:04

## 2019-11-16 RX ADMIN — IOVERSOL 90 ML: 678 INJECTION INTRA-ARTERIAL; INTRAVENOUS at 18:00

## 2019-11-16 RX ADMIN — METRONIDAZOLE 500 MG: 250 TABLET ORAL at 19:56

## 2019-11-16 RX ADMIN — BUTORPHANOL TARTRATE 1 MG: 1 INJECTION, SOLUTION INTRAMUSCULAR; INTRAVENOUS at 17:05

## 2019-11-16 RX ADMIN — BUTORPHANOL TARTRATE 1 MG: 1 INJECTION, SOLUTION INTRAMUSCULAR; INTRAVENOUS at 18:36

## 2019-11-16 NOTE — ED PROVIDER NOTES
"Subjective   69-year-old female with a history of cardiovascular disease diabetes hypertension recently seen in the hospital in the ER for abdominal pain diagnosed with diverticulitis presents the emergency room with persistent abdominal pain as well as diarrhea.  Patient states she has had multiple episodes of diarrhea since being discharged from the ER.  She states she is been on amoxicillin however she states pain persist.  She states that she has had nausea.  She states that she is feels weak due to persistent diarrhea.  She states that she had decreased appetite since onset of symptoms.        Abdominal Pain   Pain location:  LLQ  Pain quality: aching and sharp    Timing:  Constant  Progression:  Worsening  Associated symptoms: diarrhea, fatigue and nausea    Associated symptoms: no chest pain, no chills, no cough, no fever, no hematemesis, no hematochezia, no hematuria, no shortness of breath and no vomiting        Review of Systems   Constitutional: Positive for fatigue. Negative for chills and fever.   Respiratory: Negative for cough, shortness of breath and wheezing.    Cardiovascular: Negative for chest pain.   Gastrointestinal: Positive for abdominal pain, diarrhea and nausea. Negative for hematemesis, hematochezia and vomiting.   Genitourinary: Negative for flank pain and hematuria.   Skin: Negative for rash and wound.   Neurological: Positive for weakness. Negative for dizziness.   All other systems reviewed and are negative.      Past Medical History:   Diagnosis Date   • ASCVD (arteriosclerotic cardiovascular disease)    • Diabetes (CMS/HCC)    • Diverticulosis    • Essential hypertension    • Injury of back    • Parkinson disease (CMS/HCC)        Allergies   Allergen Reactions   • Bactrim [Sulfamethoxazole-Trimethoprim] Diarrhea   • Codeine Hallucinations   • Morphine Other (See Comments)     Pt states \"my heart stopped\"   • Tetracyclines & Related Hives       Past Surgical History:   Procedure " Laterality Date   • APPENDECTOMY     • BREAST SURGERY     • CHOLECYSTECTOMY     • CORONARY ANGIOPLASTY WITH STENT PLACEMENT     • HYSTERECTOMY         Family History   Problem Relation Age of Onset   • Heart disease Mother    • Heart disease Father        Social History     Socioeconomic History   • Marital status:      Spouse name: Not on file   • Number of children: Not on file   • Years of education: Not on file   • Highest education level: Not on file   Occupational History   • Occupation:    Tobacco Use   • Smoking status: Never Smoker   Substance and Sexual Activity   • Alcohol use: No     Frequency: Never     Comment: denies   • Drug use: No     Comment: denies   • Sexual activity: Defer           Objective   Physical Exam   Constitutional: She is oriented to person, place, and time.  Non-toxic appearance.   Ill but nontoxic.   HENT:   Head: Normocephalic and atraumatic.   Moist mucous membranes.   Eyes: EOM are normal. Pupils are equal, round, and reactive to light. No scleral icterus.   Cardiovascular: Normal rate and regular rhythm.   No murmur heard.  2+ radial pulse 2+ dorsalis pedis pulse   Pulmonary/Chest: Effort normal and breath sounds normal. She has no wheezes. She has no rhonchi. She has no rales.   Abdominal: Soft. Bowel sounds are normal. There is tenderness in the left lower quadrant. There is no rigidity, no guarding and no CVA tenderness.   Neurological: She is alert and oriented to person, place, and time.   Symmetric sensation light touch.  No focal neurologic deficit   Skin: Skin is warm and dry. Capillary refill takes less than 2 seconds.   Psychiatric: She has a normal mood and affect.   Nursing note and vitals reviewed.      Procedures           ED Course  ED Course as of Nov 16 1907   Sat Nov 16, 2019   1718 Patient's white blood cell count is unremarkable.  Patient's hemoglobin is low at 10.8 but is at her baseline.  [BB]   1733 Patient amount of low potassium 3.2.   Lactic acid 1.5.  [BB]   1736 Patient to receive CT scan of abdomen pelvis with IV contrast.  Patient potassium was low 3.2 have ordered oral replacement.  [BB]   1814 JOE [id number]  [BB]   1819 Patient states she is supposed to have a colonoscopy in the future.  Patient has not had any diarrhea episodes since being in the emergency room.  She states episodes occur after eating will give patient oral challenge.  [BB]   1904 Patient tolerating oral intake while in the emergency room patient had no episodes of diarrhea while being in the emergency room patient nontoxic-appearing.  Have discussed need for follow-up with her primary care provider have discussed warning signs of joint emergency room patient verbalized understanding.  Patient will be placed on Flagyl as well given her diarrhea.  Will place patient on oral potassium given her diarrhea likely has caused some mild hypokalemia.  [BB]      ED Course User Index  [BB] Curly Tena MD                  MDM  Number of Diagnoses or Management Options  Diarrhea, unspecified type: new and requires workup  Diverticulitis: new and requires workup  Hypokalemia: established and worsening     Amount and/or Complexity of Data Reviewed  Clinical lab tests: reviewed and ordered  Tests in the radiology section of CPT®: ordered and reviewed  Decide to obtain previous medical records or to obtain history from someone other than the patient: yes    Risk of Complications, Morbidity, and/or Mortality  Presenting problems: moderate  Diagnostic procedures: moderate  Management options: moderate    Patient Progress  Patient progress: stable      Final diagnoses:   Diverticulitis   Diarrhea, unspecified type   Hypokalemia              Curly Tena MD  11/16/19 [id number]

## 2019-11-17 NOTE — ED NOTES
Verbal order per Dr. Tena to not wait on urine or stool. Pt awaiting  at this time.      Candy Pichardo, RN  11/16/19 1937

## 2019-11-21 LAB
BACTERIA SPEC AEROBE CULT: NORMAL
BACTERIA SPEC AEROBE CULT: NORMAL

## 2020-08-27 RX ORDER — ATORVASTATIN CALCIUM 20 MG/1
TABLET, FILM COATED ORAL
Qty: 30 TABLET | Refills: 4 | OUTPATIENT
Start: 2020-08-27

## 2020-09-01 RX ORDER — ATORVASTATIN CALCIUM 20 MG/1
TABLET, FILM COATED ORAL
Qty: 30 TABLET | Refills: 4 | OUTPATIENT
Start: 2020-09-01

## 2020-09-03 ENCOUNTER — TRANSCRIBE ORDERS (OUTPATIENT)
Dept: ADMINISTRATIVE | Facility: HOSPITAL | Age: 70
End: 2020-09-03

## 2020-09-03 ENCOUNTER — HOSPITAL ENCOUNTER (OUTPATIENT)
Dept: GENERAL RADIOLOGY | Facility: HOSPITAL | Age: 70
Discharge: HOME | End: 2020-09-03
Admitting: FAMILY MEDICINE

## 2020-09-03 DIAGNOSIS — M25.551 HIP PAIN, RIGHT: Primary | ICD-10-CM

## 2020-09-03 DIAGNOSIS — M25.551 HIP PAIN, RIGHT: ICD-10-CM

## 2020-09-03 PROCEDURE — 73502 X-RAY EXAM HIP UNI 2-3 VIEWS: CPT | Performed by: RADIOLOGY

## 2020-09-03 PROCEDURE — 73502 X-RAY EXAM HIP UNI 2-3 VIEWS: CPT

## 2020-09-24 ENCOUNTER — TELEPHONE (OUTPATIENT)
Dept: CARDIOLOGY | Facility: CLINIC | Age: 70
End: 2020-09-24

## 2020-09-24 NOTE — TELEPHONE ENCOUNTER
Pt called and said she is holding fluids and chest pressure we havent seen her in over a year I advised her to go to her PCP ot er

## 2021-01-27 ENCOUNTER — OFFICE VISIT (OUTPATIENT)
Dept: CARDIOLOGY | Facility: CLINIC | Age: 71
End: 2021-01-27

## 2021-01-27 VITALS
BODY MASS INDEX: 29.51 KG/M2 | SYSTOLIC BLOOD PRESSURE: 202 MMHG | HEIGHT: 69 IN | DIASTOLIC BLOOD PRESSURE: 95 MMHG | HEART RATE: 66 BPM | OXYGEN SATURATION: 95 % | WEIGHT: 199.2 LBS

## 2021-01-27 DIAGNOSIS — I48.0 PAROXYSMAL ATRIAL FIBRILLATION (HCC): ICD-10-CM

## 2021-01-27 DIAGNOSIS — Z01.818 OTHER SPECIFIED PRE-OPERATIVE EXAMINATION: ICD-10-CM

## 2021-01-27 DIAGNOSIS — I20.9 ANGINA, CLASS III (HCC): ICD-10-CM

## 2021-01-27 DIAGNOSIS — I10 ESSENTIAL HYPERTENSION: ICD-10-CM

## 2021-01-27 DIAGNOSIS — I25.10 ASCVD (ARTERIOSCLEROTIC CARDIOVASCULAR DISEASE): Primary | ICD-10-CM

## 2021-01-27 DIAGNOSIS — R06.09 DYSPNEA ON EXERTION: ICD-10-CM

## 2021-01-27 PROCEDURE — 93000 ELECTROCARDIOGRAM COMPLETE: CPT | Performed by: INTERNAL MEDICINE

## 2021-01-27 PROCEDURE — 99214 OFFICE O/P EST MOD 30 MIN: CPT | Performed by: INTERNAL MEDICINE

## 2021-01-27 RX ORDER — HYDRALAZINE HYDROCHLORIDE 50 MG/1
50 TABLET, FILM COATED ORAL 3 TIMES DAILY
Qty: 90 TABLET | Refills: 5 | Status: SHIPPED | OUTPATIENT
Start: 2021-01-27

## 2021-01-27 RX ORDER — AMLODIPINE BESYLATE 10 MG/1
10 TABLET ORAL DAILY
Qty: 30 TABLET | Refills: 5 | Status: SHIPPED | OUTPATIENT
Start: 2021-01-27

## 2021-01-27 RX ORDER — ALPRAZOLAM 0.5 MG/1
TABLET ORAL
COMMUNITY
Start: 2020-12-31

## 2021-01-27 RX ORDER — HYDRALAZINE HYDROCHLORIDE 50 MG/1
50 TABLET, FILM COATED ORAL 3 TIMES DAILY
COMMUNITY
End: 2021-01-27 | Stop reason: SDUPTHER

## 2021-01-27 RX ORDER — TRAZODONE HYDROCHLORIDE 100 MG/1
50 TABLET ORAL NIGHTLY
COMMUNITY
Start: 2020-12-31

## 2021-02-02 ENCOUNTER — TRANSCRIBE ORDERS (OUTPATIENT)
Dept: ADMINISTRATIVE | Facility: HOSPITAL | Age: 71
End: 2021-02-02

## 2021-02-02 DIAGNOSIS — Z01.818 PRE-OPERATIVE CLEARANCE: Primary | ICD-10-CM

## 2021-02-02 PROBLEM — I20.9 ANGINA, CLASS III (HCC): Status: ACTIVE | Noted: 2021-02-02

## 2021-02-02 PROBLEM — R06.09 DYSPNEA ON EXERTION: Status: ACTIVE | Noted: 2021-02-02

## 2021-02-05 ENCOUNTER — HOSPITAL ENCOUNTER (OUTPATIENT)
Dept: CARDIOLOGY | Facility: HOSPITAL | Age: 71
Discharge: HOME | End: 2021-02-05

## 2021-02-05 ENCOUNTER — LAB (OUTPATIENT)
Dept: LAB | Facility: HOSPITAL | Age: 71
End: 2021-02-05

## 2021-02-05 DIAGNOSIS — I20.9 ANGINA, CLASS III (HCC): ICD-10-CM

## 2021-02-05 DIAGNOSIS — R06.09 DYSPNEA ON EXERTION: ICD-10-CM

## 2021-02-05 DIAGNOSIS — I25.10 ASCVD (ARTERIOSCLEROTIC CARDIOVASCULAR DISEASE): ICD-10-CM

## 2021-02-05 DIAGNOSIS — Z01.818 PRE-OPERATIVE CLEARANCE: ICD-10-CM

## 2021-02-05 DIAGNOSIS — I10 ESSENTIAL HYPERTENSION: ICD-10-CM

## 2021-02-05 DIAGNOSIS — I48.0 PAROXYSMAL ATRIAL FIBRILLATION (HCC): ICD-10-CM

## 2021-02-05 PROCEDURE — 93306 TTE W/DOPPLER COMPLETE: CPT | Performed by: INTERNAL MEDICINE

## 2021-02-05 PROCEDURE — 93306 TTE W/DOPPLER COMPLETE: CPT

## 2021-02-05 PROCEDURE — U0004 COV-19 TEST NON-CDC HGH THRU: HCPCS | Performed by: INTERNAL MEDICINE

## 2021-02-06 LAB — SARS-COV-2 RNA RESP QL NAA+PROBE: NOT DETECTED

## 2021-02-08 ENCOUNTER — HOSPITAL ENCOUNTER (OUTPATIENT)
Facility: HOSPITAL | Age: 71
Setting detail: HOSPITAL OUTPATIENT SURGERY
Discharge: HOME | End: 2021-02-08
Attending: INTERNAL MEDICINE | Admitting: INTERNAL MEDICINE

## 2021-02-08 VITALS
TEMPERATURE: 97.3 F | HEIGHT: 69 IN | SYSTOLIC BLOOD PRESSURE: 142 MMHG | OXYGEN SATURATION: 100 % | HEART RATE: 61 BPM | DIASTOLIC BLOOD PRESSURE: 73 MMHG | BODY MASS INDEX: 28.14 KG/M2 | RESPIRATION RATE: 16 BRPM | WEIGHT: 190 LBS

## 2021-02-08 DIAGNOSIS — I48.0 PAROXYSMAL ATRIAL FIBRILLATION (HCC): ICD-10-CM

## 2021-02-08 DIAGNOSIS — I25.10 ASCVD (ARTERIOSCLEROTIC CARDIOVASCULAR DISEASE): ICD-10-CM

## 2021-02-08 DIAGNOSIS — I10 ESSENTIAL HYPERTENSION: ICD-10-CM

## 2021-02-08 DIAGNOSIS — I20.9 ANGINA, CLASS III (HCC): ICD-10-CM

## 2021-02-08 DIAGNOSIS — R06.09 DYSPNEA ON EXERTION: ICD-10-CM

## 2021-02-08 LAB
ALBUMIN SERPL-MCNC: 4.21 G/DL (ref 3.5–5.2)
ALBUMIN/GLOB SERPL: 1.3 G/DL
ALP SERPL-CCNC: 85 U/L (ref 39–117)
ALT SERPL W P-5'-P-CCNC: <5 U/L (ref 1–33)
ANION GAP SERPL CALCULATED.3IONS-SCNC: 9.1 MMOL/L (ref 5–15)
APTT PPP: 28.5 SECONDS (ref 25.6–35.3)
AST SERPL-CCNC: 14 U/L (ref 1–32)
BASOPHILS # BLD AUTO: 0.07 10*3/MM3 (ref 0–0.2)
BASOPHILS NFR BLD AUTO: 1 % (ref 0–1.5)
BH CV ECHO MEAS - % IVS THICK: 62 %
BH CV ECHO MEAS - % LVPW THICK: 58.1 %
BH CV ECHO MEAS - ACS: 1.2 CM
BH CV ECHO MEAS - AO MAX PG (FULL): 22.8 MMHG
BH CV ECHO MEAS - AO MAX PG: 26.6 MMHG
BH CV ECHO MEAS - AO MEAN PG (FULL): 13 MMHG
BH CV ECHO MEAS - AO MEAN PG: 15 MMHG
BH CV ECHO MEAS - AO ROOT AREA (BSA CORRECTED): 1.3
BH CV ECHO MEAS - AO ROOT AREA: 5.7 CM^2
BH CV ECHO MEAS - AO ROOT DIAM: 2.7 CM
BH CV ECHO MEAS - AO V2 MAX: 257.7 CM/SEC
BH CV ECHO MEAS - AO V2 MEAN: 177 CM/SEC
BH CV ECHO MEAS - AO V2 VTI: 74.6 CM
BH CV ECHO MEAS - AVA(I,A): 0.87 CM^2
BH CV ECHO MEAS - AVA(I,D): 0.87 CM^2
BH CV ECHO MEAS - AVA(V,A): 0.95 CM^2
BH CV ECHO MEAS - AVA(V,D): 0.95 CM^2
BH CV ECHO MEAS - BSA(HAYCOCK): 2.1 M^2
BH CV ECHO MEAS - BSA: 2.1 M^2
BH CV ECHO MEAS - BZI_BMI: 29.4 KILOGRAMS/M^2
BH CV ECHO MEAS - BZI_METRIC_HEIGHT: 175.3 CM
BH CV ECHO MEAS - BZI_METRIC_WEIGHT: 90.3 KG
BH CV ECHO MEAS - EDV(CUBED): 185.7 ML
BH CV ECHO MEAS - EDV(MOD-SP4): 38.2 ML
BH CV ECHO MEAS - EDV(TEICH): 160.4 ML
BH CV ECHO MEAS - EF(CUBED): 73.4 %
BH CV ECHO MEAS - EF(MOD-SP4): 65.7 %
BH CV ECHO MEAS - EF(TEICH): 64.5 %
BH CV ECHO MEAS - ESV(CUBED): 49.4 ML
BH CV ECHO MEAS - ESV(MOD-SP4): 13.1 ML
BH CV ECHO MEAS - ESV(TEICH): 57 ML
BH CV ECHO MEAS - FS: 35.7 %
BH CV ECHO MEAS - IVS/LVPW: 0.71
BH CV ECHO MEAS - IVSD: 0.78 CM
BH CV ECHO MEAS - IVSS: 1.3 CM
BH CV ECHO MEAS - LA DIMENSION: 3.3 CM
BH CV ECHO MEAS - LA/AO: 1.2
BH CV ECHO MEAS - LV DIASTOLIC VOL/BSA (35-75): 18.5 ML/M^2
BH CV ECHO MEAS - LV MASS(C)D: 209 GRAMS
BH CV ECHO MEAS - LV MASS(C)DI: 101.4 GRAMS/M^2
BH CV ECHO MEAS - LV MASS(C)S: 206.4 GRAMS
BH CV ECHO MEAS - LV MASS(C)SI: 100.1 GRAMS/M^2
BH CV ECHO MEAS - LV MAX PG: 3.7 MMHG
BH CV ECHO MEAS - LV MEAN PG: 2 MMHG
BH CV ECHO MEAS - LV SYSTOLIC VOL/BSA (12-30): 6.4 ML/M^2
BH CV ECHO MEAS - LV V1 MAX: 96.7 CM/SEC
BH CV ECHO MEAS - LV V1 MEAN: 65.9 CM/SEC
BH CV ECHO MEAS - LV V1 VTI: 25.5 CM
BH CV ECHO MEAS - LVIDD: 5.7 CM
BH CV ECHO MEAS - LVIDS: 3.7 CM
BH CV ECHO MEAS - LVLD AP4: 7.2 CM
BH CV ECHO MEAS - LVLS AP4: 6.2 CM
BH CV ECHO MEAS - LVOT AREA (M): 2.5 CM^2
BH CV ECHO MEAS - LVOT AREA: 2.5 CM^2
BH CV ECHO MEAS - LVOT DIAM: 1.8 CM
BH CV ECHO MEAS - LVPWD: 1.1 CM
BH CV ECHO MEAS - LVPWS: 1.7 CM
BH CV ECHO MEAS - MV A MAX VEL: 118 CM/SEC
BH CV ECHO MEAS - MV E MAX VEL: 93 CM/SEC
BH CV ECHO MEAS - MV E/A: 0.79
BH CV ECHO MEAS - PA ACC TIME: 0.15 SEC
BH CV ECHO MEAS - PA PR(ACCEL): 12.4 MMHG
BH CV ECHO MEAS - RAP SYSTOLE: 10 MMHG
BH CV ECHO MEAS - RVSP: 41.8 MMHG
BH CV ECHO MEAS - SI(AO): 207.3 ML/M^2
BH CV ECHO MEAS - SI(CUBED): 66.1 ML/M^2
BH CV ECHO MEAS - SI(LVOT): 31.5 ML/M^2
BH CV ECHO MEAS - SI(MOD-SP4): 12.2 ML/M^2
BH CV ECHO MEAS - SI(TEICH): 50.1 ML/M^2
BH CV ECHO MEAS - SV(AO): 427.3 ML
BH CV ECHO MEAS - SV(CUBED): 136.3 ML
BH CV ECHO MEAS - SV(LVOT): 64.9 ML
BH CV ECHO MEAS - SV(MOD-SP4): 25.1 ML
BH CV ECHO MEAS - SV(TEICH): 103.4 ML
BH CV ECHO MEAS - TR MAX VEL: 282 CM/SEC
BILIRUB SERPL-MCNC: 0.4 MG/DL (ref 0–1.2)
BUN SERPL-MCNC: 25 MG/DL (ref 8–23)
BUN/CREAT SERPL: 25.3 (ref 7–25)
CALCIUM SPEC-SCNC: 9.8 MG/DL (ref 8.6–10.5)
CHLORIDE SERPL-SCNC: 103 MMOL/L (ref 98–107)
CO2 SERPL-SCNC: 26.9 MMOL/L (ref 22–29)
CREAT SERPL-MCNC: 0.99 MG/DL (ref 0.57–1)
DEPRECATED RDW RBC AUTO: 48.3 FL (ref 37–54)
EOSINOPHIL # BLD AUTO: 0.08 10*3/MM3 (ref 0–0.4)
EOSINOPHIL NFR BLD AUTO: 1.1 % (ref 0.3–6.2)
ERYTHROCYTE [DISTWIDTH] IN BLOOD BY AUTOMATED COUNT: 13.5 % (ref 12.3–15.4)
GFR SERPL CREATININE-BSD FRML MDRD: 55 ML/MIN/1.73
GLOBULIN UR ELPH-MCNC: 3.2 GM/DL
GLUCOSE SERPL-MCNC: 101 MG/DL (ref 65–99)
HCT VFR BLD AUTO: 41.9 % (ref 34–46.6)
HGB BLD-MCNC: 13.2 G/DL (ref 12–15.9)
IMM GRANULOCYTES # BLD AUTO: 0.02 10*3/MM3 (ref 0–0.05)
IMM GRANULOCYTES NFR BLD AUTO: 0.3 % (ref 0–0.5)
INR PPP: 0.96 (ref 0.9–1.1)
LYMPHOCYTES # BLD AUTO: 2.39 10*3/MM3 (ref 0.7–3.1)
LYMPHOCYTES NFR BLD AUTO: 32.5 % (ref 19.6–45.3)
MAXIMAL PREDICTED HEART RATE: 150 BPM
MCH RBC QN AUTO: 30.4 PG (ref 26.6–33)
MCHC RBC AUTO-ENTMCNC: 31.5 G/DL (ref 31.5–35.7)
MCV RBC AUTO: 96.5 FL (ref 79–97)
MONOCYTES # BLD AUTO: 0.4 10*3/MM3 (ref 0.1–0.9)
MONOCYTES NFR BLD AUTO: 5.4 % (ref 5–12)
NEUTROPHILS NFR BLD AUTO: 4.39 10*3/MM3 (ref 1.7–7)
NEUTROPHILS NFR BLD AUTO: 59.7 % (ref 42.7–76)
NRBC BLD AUTO-RTO: 0 /100 WBC (ref 0–0.2)
PLATELET # BLD AUTO: 232 10*3/MM3 (ref 140–450)
PMV BLD AUTO: 9.8 FL (ref 6–12)
POTASSIUM SERPL-SCNC: 4.4 MMOL/L (ref 3.5–5.2)
PROT SERPL-MCNC: 7.4 G/DL (ref 6–8.5)
PROTHROMBIN TIME: 12.6 SECONDS (ref 11.9–14.1)
RBC # BLD AUTO: 4.34 10*6/MM3 (ref 3.77–5.28)
SODIUM SERPL-SCNC: 139 MMOL/L (ref 136–145)
STRESS TARGET HR: 128 BPM
WBC # BLD AUTO: 7.35 10*3/MM3 (ref 3.4–10.8)

## 2021-02-08 PROCEDURE — C1769 GUIDE WIRE: HCPCS | Performed by: INTERNAL MEDICINE

## 2021-02-08 PROCEDURE — 85610 PROTHROMBIN TIME: CPT | Performed by: INTERNAL MEDICINE

## 2021-02-08 PROCEDURE — 85025 COMPLETE CBC W/AUTO DIFF WBC: CPT | Performed by: INTERNAL MEDICINE

## 2021-02-08 PROCEDURE — 85730 THROMBOPLASTIN TIME PARTIAL: CPT | Performed by: INTERNAL MEDICINE

## 2021-02-08 PROCEDURE — C1894 INTRO/SHEATH, NON-LASER: HCPCS | Performed by: INTERNAL MEDICINE

## 2021-02-08 PROCEDURE — 25010000002 HEPARIN (PORCINE) PER 1000 UNITS: Performed by: INTERNAL MEDICINE

## 2021-02-08 PROCEDURE — 80053 COMPREHEN METABOLIC PANEL: CPT | Performed by: INTERNAL MEDICINE

## 2021-02-08 PROCEDURE — 93458 L HRT ARTERY/VENTRICLE ANGIO: CPT | Performed by: INTERNAL MEDICINE

## 2021-02-08 PROCEDURE — 0 IOPAMIDOL PER 1 ML: Performed by: INTERNAL MEDICINE

## 2021-02-08 PROCEDURE — 25010000002 FENTANYL CITRATE (PF) 100 MCG/2ML SOLUTION: Performed by: INTERNAL MEDICINE

## 2021-02-08 PROCEDURE — 99152 MOD SED SAME PHYS/QHP 5/>YRS: CPT | Performed by: INTERNAL MEDICINE

## 2021-02-08 PROCEDURE — 25010000002 MIDAZOLAM PER 1 MG: Performed by: INTERNAL MEDICINE

## 2021-02-08 RX ORDER — MIDAZOLAM HYDROCHLORIDE 1 MG/ML
INJECTION INTRAMUSCULAR; INTRAVENOUS AS NEEDED
Status: DISCONTINUED | OUTPATIENT
Start: 2021-02-08 | End: 2021-02-08 | Stop reason: HOSPADM

## 2021-02-08 RX ORDER — HEPARIN SODIUM 1000 [USP'U]/ML
INJECTION, SOLUTION INTRAVENOUS; SUBCUTANEOUS AS NEEDED
Status: DISCONTINUED | OUTPATIENT
Start: 2021-02-08 | End: 2021-02-08 | Stop reason: HOSPADM

## 2021-02-08 RX ORDER — SODIUM CHLORIDE 9 MG/ML
INJECTION, SOLUTION INTRAVENOUS CONTINUOUS PRN
Status: DISCONTINUED | OUTPATIENT
Start: 2021-02-08 | End: 2021-02-08 | Stop reason: HOSPADM

## 2021-02-08 RX ORDER — SODIUM CHLORIDE 9 MG/ML
100 INJECTION, SOLUTION INTRAVENOUS CONTINUOUS
Status: CANCELLED | OUTPATIENT
Start: 2021-02-08

## 2021-02-08 RX ORDER — LIDOCAINE HYDROCHLORIDE 20 MG/ML
INJECTION, SOLUTION INFILTRATION; PERINEURAL AS NEEDED
Status: DISCONTINUED | OUTPATIENT
Start: 2021-02-08 | End: 2021-02-08 | Stop reason: HOSPADM

## 2021-02-08 RX ORDER — FENTANYL CITRATE 50 UG/ML
INJECTION, SOLUTION INTRAMUSCULAR; INTRAVENOUS AS NEEDED
Status: DISCONTINUED | OUTPATIENT
Start: 2021-02-08 | End: 2021-02-08 | Stop reason: HOSPADM

## 2021-02-08 NOTE — DISCHARGE INSTR - ACTIVITY
Take it easy for the next several days to one week. No driving or signing legal documents for the next 24 hours. Keep cath site clean, dry, and covered. Do not submerge site underwater, no hot tubs, tub baths, or swimming pools for the next several days to one week. Do not lift anything greater than 5 lbs with the right hand for the next several days to one week.

## 2021-02-08 NOTE — PHARMACY PATIENT ASSISTANCE
Pharmacy evaluated the cost of Eliquis for patient. Ms. Hodgson's copay will be $9.20.     Thank you,    Loree Arredondo, PharmD  02/08/21  10:00 EST

## 2021-02-08 NOTE — INTERVAL H&P NOTE
H&P reviewed. The patient was examined and there are no changes to the H&P.      I have explained the risks associated with the procedure to the patient including but not limited to an allergic reaction to the contrast material or medications used during the procedure bleeding, infection, and bruising at the catheter insertion site blood clots, which may trigger heart attack, stroke,   damage to the artery where the catheter was inserted, or damage to the arteries as the catheter travels through your body, irregular heart rhythm arrhythmias, kidney damage caused by the contrast material.

## 2021-02-26 ENCOUNTER — TRANSCRIBE ORDERS (OUTPATIENT)
Dept: ADMINISTRATIVE | Facility: HOSPITAL | Age: 71
End: 2021-02-26

## 2021-02-26 DIAGNOSIS — R41.3 MEMORY LOSS: Primary | ICD-10-CM

## 2021-03-08 ENCOUNTER — IMMUNIZATION (OUTPATIENT)
Dept: VACCINE CLINIC | Facility: HOSPITAL | Age: 71
End: 2021-03-08

## 2021-03-08 PROCEDURE — 0001A: CPT | Performed by: INTERNAL MEDICINE

## 2021-03-08 PROCEDURE — 91300 HC SARSCOV02 VAC 30MCG/0.3ML IM: CPT | Performed by: INTERNAL MEDICINE

## 2021-03-18 ENCOUNTER — TELEPHONE (OUTPATIENT)
Dept: CARDIOLOGY | Facility: CLINIC | Age: 71
End: 2021-03-18

## 2021-03-18 NOTE — TELEPHONE ENCOUNTER
----- Message from Yodit Mccain MA sent at 1/27/2021  2:41 PM EST -----  Labs from Dr. Orlando. They only sent PT/INR

## 2021-03-26 ENCOUNTER — IMMUNIZATION (OUTPATIENT)
Dept: VACCINE CLINIC | Facility: HOSPITAL | Age: 71
End: 2021-03-26

## 2021-03-26 PROCEDURE — 0002A: CPT | Performed by: INTERNAL MEDICINE

## 2021-03-26 PROCEDURE — 91300 HC SARSCOV02 VAC 30MCG/0.3ML IM: CPT | Performed by: INTERNAL MEDICINE

## 2021-04-03 ENCOUNTER — HOSPITAL ENCOUNTER (EMERGENCY)
Facility: HOSPITAL | Age: 71
Discharge: HOME | End: 2021-04-03
Attending: STUDENT IN AN ORGANIZED HEALTH CARE EDUCATION/TRAINING PROGRAM | Admitting: STUDENT IN AN ORGANIZED HEALTH CARE EDUCATION/TRAINING PROGRAM

## 2021-04-03 ENCOUNTER — APPOINTMENT (OUTPATIENT)
Dept: CT IMAGING | Facility: HOSPITAL | Age: 71
End: 2021-04-03

## 2021-04-03 VITALS
BODY MASS INDEX: 28.88 KG/M2 | HEIGHT: 69 IN | HEART RATE: 67 BPM | OXYGEN SATURATION: 100 % | RESPIRATION RATE: 18 BRPM | DIASTOLIC BLOOD PRESSURE: 84 MMHG | TEMPERATURE: 98.7 F | WEIGHT: 195 LBS | SYSTOLIC BLOOD PRESSURE: 166 MMHG

## 2021-04-03 DIAGNOSIS — M48.061 SPINAL STENOSIS OF LUMBAR REGION WITHOUT NEUROGENIC CLAUDICATION: Primary | ICD-10-CM

## 2021-04-03 DIAGNOSIS — M54.50 ACUTE LEFT-SIDED LOW BACK PAIN WITHOUT SCIATICA: ICD-10-CM

## 2021-04-03 LAB
ALBUMIN SERPL-MCNC: 4.05 G/DL (ref 3.5–5.2)
ALBUMIN/GLOB SERPL: 1.3 G/DL
ALP SERPL-CCNC: 95 U/L (ref 39–117)
ALT SERPL W P-5'-P-CCNC: 7 U/L (ref 1–33)
ANION GAP SERPL CALCULATED.3IONS-SCNC: 9.2 MMOL/L (ref 5–15)
AST SERPL-CCNC: 14 U/L (ref 1–32)
BACTERIA UR QL AUTO: ABNORMAL /HPF
BASOPHILS # BLD AUTO: 0.1 10*3/MM3 (ref 0–0.2)
BASOPHILS NFR BLD AUTO: 1.3 % (ref 0–1.5)
BILIRUB SERPL-MCNC: 0.2 MG/DL (ref 0–1.2)
BILIRUB UR QL STRIP: NEGATIVE
BUN SERPL-MCNC: 15 MG/DL (ref 8–23)
BUN/CREAT SERPL: 15.5 (ref 7–25)
CALCIUM SPEC-SCNC: 9.5 MG/DL (ref 8.6–10.5)
CHLORIDE SERPL-SCNC: 103 MMOL/L (ref 98–107)
CLARITY UR: CLEAR
CO2 SERPL-SCNC: 26.8 MMOL/L (ref 22–29)
COLOR UR: YELLOW
CREAT SERPL-MCNC: 0.97 MG/DL (ref 0.57–1)
DEPRECATED RDW RBC AUTO: 48.5 FL (ref 37–54)
EOSINOPHIL # BLD AUTO: 0.11 10*3/MM3 (ref 0–0.4)
EOSINOPHIL NFR BLD AUTO: 1.4 % (ref 0.3–6.2)
ERYTHROCYTE [DISTWIDTH] IN BLOOD BY AUTOMATED COUNT: 13.6 % (ref 12.3–15.4)
GFR SERPL CREATININE-BSD FRML MDRD: 57 ML/MIN/1.73
GLOBULIN UR ELPH-MCNC: 3.2 GM/DL
GLUCOSE SERPL-MCNC: 91 MG/DL (ref 65–99)
GLUCOSE UR STRIP-MCNC: NEGATIVE MG/DL
HCT VFR BLD AUTO: 39.7 % (ref 34–46.6)
HGB BLD-MCNC: 12.5 G/DL (ref 12–15.9)
HGB UR QL STRIP.AUTO: NEGATIVE
HYALINE CASTS UR QL AUTO: ABNORMAL /LPF
IMM GRANULOCYTES # BLD AUTO: 0.03 10*3/MM3 (ref 0–0.05)
IMM GRANULOCYTES NFR BLD AUTO: 0.4 % (ref 0–0.5)
KETONES UR QL STRIP: NEGATIVE
LEUKOCYTE ESTERASE UR QL STRIP.AUTO: ABNORMAL
LYMPHOCYTES # BLD AUTO: 2.19 10*3/MM3 (ref 0.7–3.1)
LYMPHOCYTES NFR BLD AUTO: 28.6 % (ref 19.6–45.3)
MCH RBC QN AUTO: 30.3 PG (ref 26.6–33)
MCHC RBC AUTO-ENTMCNC: 31.5 G/DL (ref 31.5–35.7)
MCV RBC AUTO: 96.1 FL (ref 79–97)
MONOCYTES # BLD AUTO: 0.47 10*3/MM3 (ref 0.1–0.9)
MONOCYTES NFR BLD AUTO: 6.1 % (ref 5–12)
NEUTROPHILS NFR BLD AUTO: 4.75 10*3/MM3 (ref 1.7–7)
NEUTROPHILS NFR BLD AUTO: 62.2 % (ref 42.7–76)
NITRITE UR QL STRIP: NEGATIVE
NRBC BLD AUTO-RTO: 0 /100 WBC (ref 0–0.2)
PH UR STRIP.AUTO: 5.5 [PH] (ref 5–8)
PLATELET # BLD AUTO: 226 10*3/MM3 (ref 140–450)
PMV BLD AUTO: 9.5 FL (ref 6–12)
POTASSIUM SERPL-SCNC: 4.1 MMOL/L (ref 3.5–5.2)
PROT SERPL-MCNC: 7.2 G/DL (ref 6–8.5)
PROT UR QL STRIP: NEGATIVE
RBC # BLD AUTO: 4.13 10*6/MM3 (ref 3.77–5.28)
RBC # UR: ABNORMAL /HPF
REF LAB TEST METHOD: ABNORMAL
SODIUM SERPL-SCNC: 139 MMOL/L (ref 136–145)
SP GR UR STRIP: 1.02 (ref 1–1.03)
SQUAMOUS #/AREA URNS HPF: ABNORMAL /HPF
UROBILINOGEN UR QL STRIP: ABNORMAL
WBC # BLD AUTO: 7.65 10*3/MM3 (ref 3.4–10.8)
WBC UR QL AUTO: ABNORMAL /HPF

## 2021-04-03 PROCEDURE — 81001 URINALYSIS AUTO W/SCOPE: CPT | Performed by: PHYSICIAN ASSISTANT

## 2021-04-03 PROCEDURE — 99283 EMERGENCY DEPT VISIT LOW MDM: CPT

## 2021-04-03 PROCEDURE — 72128 CT CHEST SPINE W/O DYE: CPT

## 2021-04-03 PROCEDURE — 85025 COMPLETE CBC W/AUTO DIFF WBC: CPT | Performed by: PHYSICIAN ASSISTANT

## 2021-04-03 PROCEDURE — 25010000002 DEXAMETHASONE PER 1 MG: Performed by: PHYSICIAN ASSISTANT

## 2021-04-03 PROCEDURE — 74176 CT ABD & PELVIS W/O CONTRAST: CPT

## 2021-04-03 PROCEDURE — 74176 CT ABD & PELVIS W/O CONTRAST: CPT | Performed by: RADIOLOGY

## 2021-04-03 PROCEDURE — 96375 TX/PRO/DX INJ NEW DRUG ADDON: CPT

## 2021-04-03 PROCEDURE — 96374 THER/PROPH/DIAG INJ IV PUSH: CPT

## 2021-04-03 PROCEDURE — 25010000002 HYDROMORPHONE 1 MG/ML SOLUTION: Performed by: STUDENT IN AN ORGANIZED HEALTH CARE EDUCATION/TRAINING PROGRAM

## 2021-04-03 PROCEDURE — 72131 CT LUMBAR SPINE W/O DYE: CPT

## 2021-04-03 PROCEDURE — 80053 COMPREHEN METABOLIC PANEL: CPT | Performed by: PHYSICIAN ASSISTANT

## 2021-04-03 RX ORDER — DEXAMETHASONE SODIUM PHOSPHATE 10 MG/ML
6 INJECTION INTRAMUSCULAR; INTRAVENOUS ONCE
Status: COMPLETED | OUTPATIENT
Start: 2021-04-03 | End: 2021-04-03

## 2021-04-03 RX ORDER — HYDROMORPHONE HYDROCHLORIDE 1 MG/ML
0.5 INJECTION, SOLUTION INTRAMUSCULAR; INTRAVENOUS; SUBCUTANEOUS ONCE
Status: COMPLETED | OUTPATIENT
Start: 2021-04-03 | End: 2021-04-03

## 2021-04-03 RX ORDER — SODIUM CHLORIDE 0.9 % (FLUSH) 0.9 %
10 SYRINGE (ML) INJECTION AS NEEDED
Status: DISCONTINUED | OUTPATIENT
Start: 2021-04-03 | End: 2021-04-03 | Stop reason: HOSPADM

## 2021-04-03 RX ORDER — HYDROCODONE BITARTRATE AND ACETAMINOPHEN 10; 325 MG/1; MG/1
1 TABLET ORAL ONCE
Status: COMPLETED | OUTPATIENT
Start: 2021-04-03 | End: 2021-04-03

## 2021-04-03 RX ADMIN — HYDROCODONE BITARTRATE AND ACETAMINOPHEN 1 TABLET: 10; 325 TABLET ORAL at 14:39

## 2021-04-03 RX ADMIN — HYDROMORPHONE HYDROCHLORIDE 0.5 MG: 1 INJECTION, SOLUTION INTRAMUSCULAR; INTRAVENOUS; SUBCUTANEOUS at 18:57

## 2021-04-03 RX ADMIN — DEXAMETHASONE SODIUM PHOSPHATE 6 MG: 10 INJECTION INTRAMUSCULAR; INTRAVENOUS at 18:56

## 2021-04-03 NOTE — ED PROVIDER NOTES
Subjective   Patient is a 70-year-old female with Parkinson's disease, hypertension, diabetes, coronary artery disease, atrial fibrillation, and scoliosis that presents to the ED with complaints of left-sided flank pain radiating to the lower back and abdomen.  She describes it as a spasm and aching-like pain.  She states this all started on Thursday.  She states she is also having some hesitancy with voiding.  She denies chest pain, shortness of breath, fever, chills, nausea, vomiting, extremity weakness or paresthesias, bladder or bowel incontinence, perianal paresthesias, abdominal pain, dysuria, hematuria, or diarrhea.      History provided by:  Patient   used: No    Flank Pain  Pain location:  L flank  Pain quality: aching and cramping    Pain radiates to:  Suprapubic region  Pain severity:  Moderate  Onset quality:  Sudden  Duration:  2 days  Timing:  Constant  Progression:  Worsening  Chronicity:  New  Context: not alcohol use, not awakening from sleep, not diet changes, not eating, not laxative use, not medication withdrawal, not previous surgeries, not recent illness, not recent travel, not retching, not sick contacts, not suspicious food intake and not trauma    Relieved by:  Nothing  Worsened by:  Nothing  Ineffective treatments:  None tried  Associated symptoms: dysuria    Associated symptoms: no chest pain, no chills, no constipation, no cough, no diarrhea, no fever, no hematemesis, no hematuria, no nausea, no shortness of breath, no sore throat and no vomiting    Risk factors: being elderly        Review of Systems   Constitutional: Negative.  Negative for chills and fever.   HENT: Negative.  Negative for congestion, ear discharge, ear pain, facial swelling, postnasal drip, rhinorrhea, sinus pressure, sinus pain, sneezing, sore throat, tinnitus and trouble swallowing.    Eyes: Negative.  Negative for photophobia, pain, discharge, redness and itching.   Respiratory: Negative.   "Negative for cough, shortness of breath and wheezing.    Cardiovascular: Negative.  Negative for chest pain.   Gastrointestinal: Negative.  Negative for abdominal pain, constipation, diarrhea, hematemesis, nausea and vomiting.   Endocrine: Negative.    Genitourinary: Positive for dysuria, flank pain, frequency and urgency. Negative for difficulty urinating and hematuria.   Musculoskeletal: Negative for arthralgias, back pain, gait problem, joint swelling, myalgias, neck pain and neck stiffness.   Skin: Negative.    Neurological: Negative.  Negative for dizziness, syncope, facial asymmetry, weakness, light-headedness, numbness and headaches.   Psychiatric/Behavioral: Negative.  Negative for confusion.   All other systems reviewed and are negative.      Past Medical History:   Diagnosis Date   • Aortic stenosis    • Arrhythmia    • ASCVD (arteriosclerotic cardiovascular disease)    • Chronic kidney disease    • Coronary artery disease    • Diabetes (CMS/HCC)    • Diverticulosis    • Essential hypertension    • Injury of back    • Parkinson disease (CMS/HCC)        Allergies   Allergen Reactions   • Bactrim [Sulfamethoxazole-Trimethoprim] Diarrhea   • Codeine Hallucinations   • Morphine Other (See Comments)     Pt states \"my heart stopped\"   • Tetracyclines & Related Hives       Past Surgical History:   Procedure Laterality Date   • APPENDECTOMY     • BREAST SURGERY     • CARDIAC CATHETERIZATION     • CARDIAC CATHETERIZATION N/A 2/8/2021    Procedure: Left Heart Cath;  Surgeon: Jared Lozano MD;  [facility];  Service: Cardiology;  Laterality: N/A;   • CHOLECYSTECTOMY     • CORONARY ANGIOPLASTY WITH STENT PLACEMENT     • HYSTERECTOMY         Family History   Problem Relation Age of Onset   • Heart disease Mother    • Heart disease Father        Social History     Socioeconomic History   • Marital status:      Spouse name: Not on file   • Number of children: Not on file "   • Years of education: Not on file   • Highest education level: Not on file   Tobacco Use   • Smoking status: Never Smoker   • Smokeless tobacco: Never Used   Substance and Sexual Activity   • Alcohol use: No     Comment: denies   • Drug use: No     Comment: denies   • Sexual activity: Defer           Objective   Physical Exam  Vitals and nursing note reviewed.   Constitutional:       General: She is not in acute distress.     Appearance: She is well-developed. She is not diaphoretic.   HENT:      Head: Normocephalic and atraumatic.      Right Ear: External ear normal.      Left Ear: External ear normal.      Nose: Nose normal.      Mouth/Throat:      Mouth: Mucous membranes are moist.      Pharynx: Oropharynx is clear.   Eyes:      Extraocular Movements: Extraocular movements intact.      Conjunctiva/sclera: Conjunctivae normal.      Pupils: Pupils are equal, round, and reactive to light.   Neck:      Vascular: No JVD.      Trachea: No tracheal deviation.   Cardiovascular:      Rate and Rhythm: Normal rate and regular rhythm.      Heart sounds: Normal heart sounds. No murmur heard.     Pulmonary:      Effort: Pulmonary effort is normal. No respiratory distress.      Breath sounds: Normal breath sounds. No wheezing.   Abdominal:      General: Bowel sounds are normal. There is no distension.      Palpations: Abdomen is soft.      Tenderness: There is abdominal tenderness in the suprapubic area. There is left CVA tenderness. There is no right CVA tenderness, guarding or rebound.   Musculoskeletal:         General: No deformity or signs of injury. Normal range of motion.      Cervical back: Normal, normal range of motion and neck supple.      Thoracic back: Spasms and tenderness present. No swelling, deformity or signs of trauma. Scoliosis present.      Lumbar back: Spasms and tenderness present. Scoliosis present.      Right lower leg: Edema present.      Left lower leg: Edema present.   Skin:     General: Skin is  warm and dry.      Coloration: Skin is not pale.      Findings: No erythema or rash.   Neurological:      Mental Status: She is alert and oriented to person, place, and time.      Cranial Nerves: No cranial nerve deficit.      Sensory: Sensation is intact.      Motor: Motor function is intact.      Coordination: Coordination is intact.      Gait: Gait is intact.   Psychiatric:         Behavior: Behavior normal.         Thought Content: Thought content normal.         Procedures           ED Course  ED Course as of Apr 03 1920   Sat Apr 03, 2021   1505 IMPRESSION:  1.  Stable exam with no acute findings identified.  2.  No renal or ureteral stones. No hydronephrosis.  3.  Mild/moderate constipation. No bowel obstruction.  4.  Diverticulosis without diverticulitis.  5.  Cardiomegaly and coronary artery calcifications.  6.  Prior appendectomy, cholecystectomy, and hysterectomy.  7.  Stable degenerative changes lumbar spine.     This report was finalized on 4/3/2021 2:50 PM by Dr. Guillermo De Leon MD.   CT Abdomen Pelvis Stone Protocol []   1713 IMPRESSION:  No acute fracture or traumatic malalignment. No bone destruction.  2. There are thoracic degenerative changes including posterior endplate spondylosis or calcified small disc extrusion at T6-7 with mild bony canal compromise. If more information is needed, and the patient is a candidate, the findings are best assessed  further with MRI.     Signer Name: Hali Cordero MD   Signed: 4/3/2021 5:09 PM   CT Thoracic Spine Without Contrast []   1714 1. Severe scoliosis with left lateral listhesis of L3 on L4 and right lateral listhesis of L4 on L5. There is likely severe canal stenosis at L3-4. There is multiple level foraminal narrowing. Findings are best evaluated further with an MRI patient is  candidate. There is no acute fracture or bone destruction suspected. Detailed description Above.     Signer Name: Hali Cordero MD   Signed: 4/3/2021 5:05 PM   CT Lumbar Spine  Without Contrast []   1815 Contacting [facility]    []   1820 Discussed with Dr. Fabian Hsu neurosurgeon at [facility].  We discussed CT findings and symptoms as well as lab findings.  He recommends pain medication with follow-up in the office first thing next week.    []   1918 Discussed plan of care with patient.  Advised if symptoms worsen return to the ED peer advised to follow-up with Dr Fabian Hsu on Monday    []      ED Course User Index  [] Nilda Holder PA-C                                           [facility]    Final diagnoses:   Spinal stenosis of lumbar region without neurogenic claudication   Acute left-sided low back pain without sciatica       ED Disposition  ED Disposition     ED Disposition Condition Comment    Discharge Stable           Greyson Harden MD  [address] [place] [address]  [phone]    Schedule an appointment as soon as possible for a visit on 4/5/2021           Medication List      No changes were made to your prescriptions during this visit.          Nilda Holder PA-C  04/03/21 1920

## 2021-04-03 NOTE — ED NOTES
Called Anshul Kenny for Nilda GARCES, she is on the phone with them at this time.      Izabel Sanderson  04/03/21 1816

## 2021-09-01 ENCOUNTER — TRANSCRIBE ORDERS (OUTPATIENT)
Dept: ADMINISTRATIVE | Facility: HOSPITAL | Age: 71
End: 2021-09-01

## 2021-09-01 DIAGNOSIS — M51.37 DEGENERATION OF LUMBAR OR LUMBOSACRAL INTERVERTEBRAL DISC: ICD-10-CM

## 2021-09-10 ENCOUNTER — HOSPITAL ENCOUNTER (OUTPATIENT)
Dept: MRI IMAGING | Facility: HOSPITAL | Age: 71
Discharge: HOME | End: 2021-09-10
Admitting: NURSE PRACTITIONER

## 2021-09-10 DIAGNOSIS — M51.37 DEGENERATION OF LUMBAR OR LUMBOSACRAL INTERVERTEBRAL DISC: ICD-10-CM

## 2021-09-10 PROCEDURE — 72148 MRI LUMBAR SPINE W/O DYE: CPT | Performed by: RADIOLOGY

## 2021-09-10 PROCEDURE — 72148 MRI LUMBAR SPINE W/O DYE: CPT

## 2021-12-31 ENCOUNTER — HOSPITAL ENCOUNTER (OUTPATIENT)
Age: 71
Setting detail: SPECIMEN
Discharge: HOME | End: 2021-12-31
Payer: MEDICARE

## 2021-12-31 LAB
ANION GAP SERPL CALCULATED.3IONS-SCNC: 8 MMOL/L (ref 4–16)
BASOPHILS ABSOLUTE: 0.1 K/CU MM
BASOPHILS RELATIVE PERCENT: 0.9 % (ref 0–1)
BUN BLDV-MCNC: 11 MG/DL (ref 6–23)
CALCIUM SERPL-MCNC: 9.1 MG/DL (ref 8.3–10.6)
CHLORIDE BLD-SCNC: 104 MMOL/L (ref 99–110)
CO2: 27 MMOL/L (ref 21–32)
CREAT SERPL-MCNC: 0.9 MG/DL (ref 0.6–1.1)
DIFFERENTIAL TYPE: ABNORMAL
EOSINOPHILS ABSOLUTE: 0.2 K/CU MM
EOSINOPHILS RELATIVE PERCENT: 1.9 % (ref 0–3)
GFR AFRICAN AMERICAN: >60 ML/MIN/1.73M2
GFR NON-AFRICAN AMERICAN: >60 ML/MIN/1.73M2
GLUCOSE BLD-MCNC: 82 MG/DL (ref 70–99)
HCT VFR BLD CALC: 37.7 % (ref 37–47)
HEMOGLOBIN: 11.9 GM/DL (ref 12.5–16)
IMMATURE NEUTROPHIL %: 0.1 % (ref 0–0.43)
LYMPHOCYTES ABSOLUTE: 3 K/CU MM
LYMPHOCYTES RELATIVE PERCENT: 37.1 % (ref 24–44)
MCH RBC QN AUTO: 29.5 PG (ref 27–31)
MCHC RBC AUTO-ENTMCNC: 31.6 % (ref 32–36)
MCV RBC AUTO: 93.5 FL (ref 78–100)
MONOCYTES ABSOLUTE: 0.5 K/CU MM
MONOCYTES RELATIVE PERCENT: 6.6 % (ref 0–4)
PDW BLD-RTO: 13.8 % (ref 11.7–14.9)
PLATELET # BLD: 189 K/CU MM (ref 140–440)
PMV BLD AUTO: 10.2 FL (ref 7.5–11.1)
POTASSIUM SERPL-SCNC: 4 MMOL/L (ref 3.5–5.1)
RBC # BLD: 4.03 M/CU MM (ref 4.2–5.4)
SEGMENTED NEUTROPHILS ABSOLUTE COUNT: 4.3 K/CU MM
SEGMENTED NEUTROPHILS RELATIVE PERCENT: 53.4 % (ref 36–66)
SODIUM BLD-SCNC: 139 MMOL/L (ref 135–145)
TOTAL IMMATURE NEUTOROPHIL: 0.01 K/CU MM
WBC # BLD: 8 K/CU MM (ref 4–10.5)

## 2021-12-31 PROCEDURE — 80048 BASIC METABOLIC PNL TOTAL CA: CPT

## 2021-12-31 PROCEDURE — 36415 COLL VENOUS BLD VENIPUNCTURE: CPT

## 2021-12-31 PROCEDURE — 85025 COMPLETE CBC W/AUTO DIFF WBC: CPT

## 2022-01-03 ENCOUNTER — OUTSIDE SERVICES (OUTPATIENT)
Dept: INTERNAL MEDICINE CLINIC | Age: 72
End: 2022-01-03

## 2022-01-03 ENCOUNTER — HOSPITAL ENCOUNTER (OUTPATIENT)
Age: 72
Setting detail: SPECIMEN
Discharge: HOME | End: 2022-01-03
Payer: COMMERCIAL

## 2022-01-03 PROCEDURE — 86480 TB TEST CELL IMMUN MEASURE: CPT

## 2022-01-03 PROCEDURE — 36415 COLL VENOUS BLD VENIPUNCTURE: CPT

## 2022-01-04 NOTE — PROGRESS NOTES
History and physical  ___________________________    Deion Avtar Berrios's  is 1950  SEEN ON 1/3/2022 she is at [facility] center   ___________________________    S:  Patient is seen today and discussed with the nurses. Patient was admitted through home. Patient is states she was in [place] for the last 2 years but recently moved to [place] [address] and was seeing Dr. Kenji Tyler as PCP. She was living with her sister but her sister was admitted to the hospital.  Patient has chronic back pain and is unable to take care of herself. She was not able to stand even to cook. Her PCP started her on Percocet to control the pain. I reviewed patient's chart. She has coronary artery disease. She had PCI done in . She had chest pain in 2021 and had a left heart catheterization which showed patent stents LAD, OM1 and RCA with mild stenosis. EF was 65% . Patient states she has pain in different areas including lower back pain. She has rheumatoid arthritis. She has a right shoulder pain. Denies any chest pain, shortness of breath or cough. She has mild pedal edema. Chronic occlusion: Clubbing: Chronic review of system normal except as in HPI  Denies nausea vomiting or diarrhea. No abdominal pain        ALLERGIES:  Allergies   Allergen Reactions    Tetracyclines & Related Other (See Comments)     hallucinations    Bactrim [Sulfamethoxazole-Trimethoprim] Diarrhea    Codeine Hives        PAST MEDICAL HISTORY:    has a past medical history of A-fib ([address] Anxiety disorder; Arthritis; Atrial fibrillation ([address] Blood transfusion; CAD (coronary artery disease) s/p stents C on 2021 showed mild nonobstructive coronary artery disease with patent stents in LAD, OM1 and RCA. EF 65%; CHF (congestive heart failure) ([address] Depression; Diabetes mellitus ([address] Dysphagia; Fall; Hyperlipidemia;  Hypertension; Lumbar disc herniation;  Parkinson's disease ([facility] and Reflux. Depression. Parkinson's disease. Chronic back pain due to DJD lumbar spine. PAST SURGICAL HISTORY:   has a past surgical history that includes Coronary angioplasty with stent; Appendectomy (1964); Breast surgery (1995); Cholecystectomy (1986); Colonoscopy (2012); Hysterectomy (1994); Tonsillectomy (1955); Vital signs:/69. Temp 97.1. Pulse 61. RR 16.  O2 sat 98% on room air  GENERAL: - Alert, oriented, pleasant, in no apparent distress. HEENT: - Conjunctiva pink, no scleral icterus. ENT clear. NECK: - Supple. No jugular venous distention noted. No masses felt,  CARDIOVASCULAR: - Normal S1 and S2    PULMONARY: - No respiratory distress. No wheezes or rales. ABDOMEN: - Soft and non-tender,no masses  or organomegaly. EXTREMITIES: - No cyanosis, clubbing, 2 + pedal edema. No calf tenderness  SKIN: Skin is warm and dry. NEUROLOGICAL: - Cranial nerves II through XII are grossly intact. Patient is having difficulty moving the legs and is states she has pain in the legs. Assessment:  .  Pedal edema     Multiple falls   . Chronic lower back pain radiating to left leg   . Parkinson's disease   . Atrial fibrillation   . Coronary artery disease status post stents 2014  . Hypertension   . Diabetes mellitus   . Hyperlipidemia   . Anxiety   . Insomnia   . Depression        Plan:  Patient has pedal edema and states cannot walk because of that. Pt hs no CHF. EF was normal. Lungs clear  Increase lasix 40 mg daily  Continue KCL  Check venous doppler of both legs  Decrease gabapentin to 300 mg bid because of edema  INR was low yesterday. Warfarin adjusted. Medication were reviewed. Continue rest of the treatment. Continue PT and OT  I discussed with cardiologist Dr. Florentin Lowry whether to continue Plavix and warfarin. He recommended to discontinue Plavix and put patient on aspirin 81 mg daily along with warfarin.

## 2022-01-06 LAB
QUANTI TB1 MINUS NIL: 0 IU/ML (ref 0–0.34)
QUANTI TB2 MINUS NIL: 0 IU/ML (ref 0–0.34)
QUANTIFERON (R) TB GOLD (INCUBATED): NEGATIVE IU/ML
QUANTIFERON MITOGEN MINUS NIL: 7.22 IU/ML
QUANTIFERON NIL: 0.05 IU/ML

## 2022-01-12 ENCOUNTER — OUTSIDE SERVICES (OUTPATIENT)
Dept: INTERNAL MEDICINE CLINIC | Age: 72
End: 2022-01-12

## 2022-01-13 NOTE — PROGRESS NOTES
Patient was seen briefly today  She is still having pain  No chest pain or shortness of breath  Pedal edema has improved

## 2022-01-31 ENCOUNTER — OUTSIDE SERVICES (OUTPATIENT)
Dept: INTERNAL MEDICINE CLINIC | Age: 72
End: 2022-01-31

## 2022-02-01 NOTE — PROGRESS NOTES
[facility] PROGRESSNOTE    ___________________________    Sachin Berrios's  is 1950  SEEN ON 2021 at [facility] center   ___________________________    S:    Patient still pain is not in control with narcotics. Will refer patient to the pain clinic      Patient is seen today and discussed with the nurses. Patient has chronic back pain and is unable to take care of herself. She was not able to stand even to cook. She has coronary artery disease. She had PCI done in . She had chest pain in 2021 and had a left heart catheterization which showed patent stents LAD, OM1 and RCA with mild stenosis. EF was 65% . Patient states she has pain in different areas including lower back pain. She has rheumatoid arthritis. She has a right shoulder pain. Denies any chest pain, shortness of breath or cough. She has mild pedal edema. Chronic occlusion: Clubbing: Chronic review of system normal except as in HPI  Denies nausea vomiting or diarrhea. No abdominal pain        ALLERGIES:  Allergies   Allergen Reactions    Tetracyclines & Related Other (See Comments)     hallucinations    Bactrim [Sulfamethoxazole-Trimethoprim] Diarrhea    Codeine Hives        PAST MEDICAL HISTORY:    has a past medical history of A-fib ([address] Anxiety disorder; Arthritis; Atrial fibrillation ([address] Blood transfusion; CAD (coronary artery disease) s/p stents [facility] on 2021 showed mild nonobstructive coronary artery disease with patent stents in LAD, OM1 and RCA. EF 65%; CHF (congestive heart failure) ([address] Depression; Diabetes mellitus ([address] Dysphagia; Fall; Hyperlipidemia; Hypertension; Lumbar disc herniation;  Parkinson's disease ([address] and Reflux. Depression. Parkinson's disease. Chronic back pain due to DJD lumbar spine.          PAST SURGICAL HISTORY:   has a past surgical history that includes Coronary angioplasty with stent; Appendectomy (1964); Breast surgery (1995); Cholecystectomy (1986); Colonoscopy (2012); Hysterectomy (1994); Tonsillectomy (1955); Vital signs: As in [facility]  GENERAL: - Alert, oriented, pleasant, in no apparent distress. HEENT: - Conjunctiva pink, no scleral icterus. ENT clear. NECK: - Supple. No jugular venous distention noted. No masses felt,  CARDIOVASCULAR: - Normal S1 and S2    PULMONARY: - No respiratory distress. No wheezes or rales. ABDOMEN: - Soft and non-tender,no masses  or organomegaly. EXTREMITIES: - No cyanosis, clubbing, 1+ pedal edema. No calf tenderness  SKIN: Skin is warm and dry. NEUROLOGICAL: - Cranial nerves II through XII are grossly intact. Patient is having difficulty moving the legs and is states she has pain in the legs. Assessment:  .  Pedal edema improved     Multiple falls   . Chronic lower back pain radiating to left leg   . Parkinson's disease   . Atrial fibrillation   . Coronary artery disease status post stents 2014  . Hypertension   . Diabetes mellitus   . Hyperlipidemia   . Anxiety   . Insomnia   .   Depression        Plan:  Keep the leg elevated  Refer patient to the pain clinic  Continue current medications including Lasix

## 2022-02-21 ENCOUNTER — OUTSIDE SERVICES (OUTPATIENT)
Dept: INTERNAL MEDICINE CLINIC | Age: 72
End: 2022-02-21

## 2022-02-22 NOTE — PROGRESS NOTES
xxxxx                                                                                 .TEMPPROGRESSNOTE    ___________________________    Kenneth Berrios's  is 1950  SEEN ON 2022 at [facility] center   ___________________________    S:        Patient is seen today and discussed with the nurses. She has coronary artery disease. She had PCI done in . She had chest pain in 2021 and had a left heart catheterization which showed patent stents LAD, OM1 and RCA with mild stenosis. EF was 65% . Patient states she has pain in different areas including lower back pain. She has rheumatoid arthritis. She has a right shoulder pain. Denies any chest pain, shortness of breath or cough. Chronic occlusion: Clubbing: C  Denies nausea vomiting or diarrhea. No abdominal pain  Pedal edema has improved        ALLERGIES:  Allergies   Allergen Reactions    Tetracyclines & Related Other (See Comments)     hallucinations    Bactrim [Sulfamethoxazole-Trimethoprim] Diarrhea    Codeine Hives        PAST MEDICAL HISTORY:    has a past medical history of A-fib ([address] Anxiety disorder; Arthritis; Atrial fibrillation ([address] Blood transfusion; CAD (coronary artery disease) s/p stents [facility] on 2021 showed mild nonobstructive coronary artery disease with patent stents in LAD, OM1 and RCA. EF 65%; CHF (congestive heart failure) ([address] Depression; Diabetes mellitus ([address] Dysphagia; Fall; Hyperlipidemia; Hypertension; Lumbar disc herniation;  Parkinson's disease ([address] and Reflux. Depression. Parkinson's disease. Chronic back pain due to DJD lumbar spine. PAST SURGICAL HISTORY:   has a past surgical history that includes Coronary angioplasty with stent; Appendectomy (); Breast surgery (); Cholecystectomy (); Colonoscopy (); Hysterectomy (); Tonsillectomy ();            GENERAL: - Alert, oriented, pleasant, in no apparent distress.     HEENT: - Conjunctiva pink, no scleral icterus. ENT clear. NECK: - Supple. No jugular venous distention noted. No masses felt,  CARDIOVASCULAR: - Normal S1 and S2    PULMONARY: - No respiratory distress. No wheezes or rales. ABDOMEN: - Soft and non-tender,no masses  or organomegaly. EXTREMITIES: - No cyanosis, clubbing, trace pedal edema. No calf tenderness  SKIN: Skin is warm and dry. NEUROLOGICAL: - Cranial nerves II through XII are grossly intact. Patient is having difficulty moving the legs and is states she has pain in the legs. Assessment:  .  Pedal edema improved     Multiple falls   . Chronic lower back pain radiating to left leg   . Parkinson's disease   . Atrial fibrillation   . Coronary artery disease status post stents 2014  . Hypertension   . Diabetes mellitus   . Hyperlipidemia   . Anxiety   . Insomnia   . Depression        Plan:  Pain control  Keep leg elevated and  Labs reviewed.   CBC normal  CMP stable creatinine normal

## 2022-03-11 ENCOUNTER — HOSPITAL ENCOUNTER (OUTPATIENT)
Age: 72
Setting detail: SPECIMEN
Discharge: HOME | End: 2022-03-11
Payer: COMMERCIAL

## 2022-03-11 LAB
ALBUMIN SERPL-MCNC: 4.3 GM/DL (ref 3.4–5)
ALP BLD-CCNC: 112 IU/L (ref 40–129)
ALT SERPL-CCNC: 9 U/L (ref 10–40)
ANION GAP SERPL CALCULATED.3IONS-SCNC: 13 MMOL/L (ref 4–16)
AST SERPL-CCNC: 11 IU/L (ref 15–37)
BILIRUB SERPL-MCNC: 0.3 MG/DL (ref 0–1)
BUN BLDV-MCNC: 19 MG/DL (ref 6–23)
C-REACTIVE PROTEIN, HIGH SENSITIVITY: 3.8 MG/L
CALCIUM SERPL-MCNC: 9.6 MG/DL (ref 8.3–10.6)
CHLORIDE BLD-SCNC: 103 MMOL/L (ref 99–110)
CO2: 25 MMOL/L (ref 21–32)
CREAT SERPL-MCNC: 1.1 MG/DL (ref 0.6–1.1)
ERYTHROCYTE SEDIMENTATION RATE: 28 MM/HR (ref 0–30)
GFR AFRICAN AMERICAN: 59 ML/MIN/1.73M2
GFR NON-AFRICAN AMERICAN: 49 ML/MIN/1.73M2
GLUCOSE BLD-MCNC: 94 MG/DL (ref 70–99)
HCT VFR BLD CALC: 40.4 % (ref 37–47)
HEMOGLOBIN: 12.8 GM/DL (ref 12.5–16)
MCH RBC QN AUTO: 29.4 PG (ref 27–31)
MCHC RBC AUTO-ENTMCNC: 31.7 % (ref 32–36)
MCV RBC AUTO: 92.7 FL (ref 78–100)
PDW BLD-RTO: 13.9 % (ref 11.7–14.9)
PLATELET # BLD: 198 K/CU MM (ref 140–440)
PMV BLD AUTO: 10.3 FL (ref 7.5–11.1)
POTASSIUM SERPL-SCNC: 4.3 MMOL/L (ref 3.5–5.1)
RBC # BLD: 4.36 M/CU MM (ref 4.2–5.4)
SODIUM BLD-SCNC: 141 MMOL/L (ref 135–145)
TOTAL PROTEIN: 6.8 GM/DL (ref 6.4–8.2)
WBC # BLD: 7.3 K/CU MM (ref 4–10.5)

## 2022-03-11 PROCEDURE — 86430 RHEUMATOID FACTOR TEST QUAL: CPT

## 2022-03-11 PROCEDURE — 85652 RBC SED RATE AUTOMATED: CPT

## 2022-03-11 PROCEDURE — 36415 COLL VENOUS BLD VENIPUNCTURE: CPT

## 2022-03-11 PROCEDURE — 86038 ANTINUCLEAR ANTIBODIES: CPT

## 2022-03-11 PROCEDURE — 85027 COMPLETE CBC AUTOMATED: CPT

## 2022-03-11 PROCEDURE — 86140 C-REACTIVE PROTEIN: CPT

## 2022-03-11 PROCEDURE — 80053 COMPREHEN METABOLIC PANEL: CPT

## 2022-03-13 LAB — ANTI-NUCLEAR ANTIBODY (ANA): NORMAL

## 2022-03-15 LAB — RHEUMATOID FACTOR: <10 IU/ML (ref 0–14)

## 2022-09-01 ENCOUNTER — HOSPITAL ENCOUNTER (OUTPATIENT)
Dept: NON INVASIVE DIAGNOSTICS | Age: 72
Discharge: HOME | End: 2022-09-01
Payer: COMMERCIAL

## 2022-09-01 VITALS
RESPIRATION RATE: 15 BRPM | SYSTOLIC BLOOD PRESSURE: 130 MMHG | OXYGEN SATURATION: 99 % | DIASTOLIC BLOOD PRESSURE: 78 MMHG | HEART RATE: 60 BPM

## 2022-09-01 PROCEDURE — 7100000001 HC PACU RECOVERY - ADDTL 15 MIN

## 2022-09-01 PROCEDURE — 7100000000 HC PACU RECOVERY - FIRST 15 MIN

## 2022-09-01 PROCEDURE — 93312 ECHO TRANSESOPHAGEAL: CPT

## 2022-09-01 NOTE — H&P
[id number]  ASA 3   Mal 3  Moderate to severe AS  For LUIS today  Hx of CAD     Electronically signed by Howie Gutiérrez MD on 9/1/22 at 9:33 AM EDT

## 2022-09-01 NOTE — PROCEDURES
[address], [address] [place] [address]                                 ECHOCARDIOGRAM    PATIENT NAME: Emanuel Mariee                    :        1950  MED REC NO:   [id number]                          ROOM:  ACCOUNT NO:   [de-identified]                           ADMIT DATE: 2022  PROVIDER:     Robert Leyva MD    INDICATION:  Aortic stenosis. This is a 77-year-old female patient brought to noninvasive lab today. The patient was given 6 mg of Versed and 100 mcg of fentanyl. The  posterior pharynx was anesthetized using lidocaine gel. A mouthguard  was placed. A LUIS probe was advanced to the posterior pharynx and mid  esophagus and images were obtained. The patient remains in sinus rhythm. The patient blood pressures remain  in 170s. The left atrium is more enlarged. No clot or thrombus noted in the left  atrium or left atrial appendage or  LV cavity --  Mitral valve leaflets are thickened. There is a moderate mitral regurgitation noted. MR is posteriorly directed. Aortic valve is sclerotic. Moderate aortic stenosis is noted. No AI is present. Ascending aorta  Is normal size. LV septal mild septal hypertrophy is noted do not think is causing obstruction or increase in gradient. No CAROLYNE is present. Aortic valve measures around 1.2 cm. On the  [place], moderate aortic stenosis present. No ASD or PFO is noted. Color Doppler and bubble study both were negative. Mild-to-moderate  tricuspid regurgitation is noted. Trace pulmonic regurgitation is  noted. LV function is preserved greater than 55%. Ascending aorta is  normal.    IMPRESSION:  1. Ascending aorta is normal.  2.  No clot or thrombus noted in left atrium, left atrial appendage, or  LV cavity. 3.  Moderate mitral regurgitation noted. There is a posterior directed  jet. 4.  LV function is preserved greater than 55%.   5.  Aortic valve is sclerotic. Moderate aortic stenosis is noted. Planimetry aortic valve area is 1.2 cm. No AI present. 6.  No ASD or PFO is noted. Mild tricuspid and pulmonic regurgitation  noted. The patient has moderate aortic stenosis noted. At this time, we will  watch her closely. I think he will need a close followup. We will make  further recommendation. The patient tolerated the procedure well. No complications noted.         Radu Thomas MD    D: 09/01/2022 9:57:00       T: 09/01/2022 10:29:40     [email or web address]  Job#: [id number]     Doc#: [id number]    CC:

## 2022-09-01 NOTE — H&P
[address] [place] [address], [facility]                              HISTORY AND PHYSICAL    PATIENT NAME: Patti Watson                    :        1950  MED REC NO:   [id number]                          ROOM:  ACCOUNT NO:   [de-identified]                           ADMIT DATE: 2022  PROVIDER:     Sabrina Cabello MD    INDICATION:  Aortic stenosis. HISTORY OF PRESENT ILLNESS:  This is a 70-year-old female patient who  lives in _____ nursing home. She has been in the nursing home because  she is unable to get around herself. She has significant back issues  present, and she has been there since March.  _____ has been taking  care of her. The patient had an echo done recently in the office, found to have  moderate-to-severe aortic stenosis noted. Valve area is 0.8, mean  gradient was 37 mmHg present. Therefore, she is here for LUIS to further  evaluate aortic valve. The patient does have dyspnea present. Lightheadedness and dyspnea  present. No syncope present. She did have shortness of breath also  present. I did a heart catheterization back in 2019. Left main was  patent, LAD 50% stenosis. FFR is 0.9. Circ had _____ and mild disease. RCA mild disease present. PAST MEDICAL HISTORY:  The patient has no history of stroke. No  seizures. Moderate coronary artery disease present. History of  hypertension and hyperlipidemia present. Back issues present. Significant spinal stenosis, not a surgical candidate. She gets around  with a walker at this time. She has a history of having paroxysmal  atrial fibrillation. She had angioplasty done in . Last heart cath  was stable. History of diabetes and hyperlipidemia present. History of  depression, GERD, and Parkinson's, osteoarthritis present.     PAST SURGICAL HISTORY:  Appendectomy, gallbladder surgery, fibroids  removed from the breast, hysterectomy, tonsillectomy. Heart  catheterization done in 2019. Moderate coronary artery disease noted. SOCIAL HISTORY:  Does not smoke. Does not drink. Lives in a nursing  home. ALLERGIES:  TETRACYCLINE, CODEINE and BACTRIM. HOME MEDICATIONS:  She is on Xanax, amlodipine, aspirin, carbidopa,  levodopa, Eliquis, folic acid and hydralazine, see the rest of the list.    PHYSICAL EXAMINATION:  GENERAL:  The patient is awake, alert, and answering questions, not in  acute distress. VITAL SIGNS:  Temperature afebrile. Pulse is 65. Blood pressure is  135/80. HEENT:  Head is normocephalic and atraumatic. Pupils are equal and  reactive. CHEST:  Equal expansion. LUNGS:  Clear to auscultation. No wheezing or rhonchi appreciated. HEART:  Regular rate and rhythm. She has a systolic ejection murmur  present. ABDOMEN:  Soft and nontender. Bowel sounds are present. No  hepatosplenomegaly or guarding appreciated. EXTREMITIES:  No cyanosis or clubbing noted. NEUROLOGIC:  Cranial nerves II through II are grossly intact. LABORATORY DATA:  No recent labs are present. DIAGNOSTIC DATA:  Her echo shows that she has severe aortic stenosis  noted. Her Holter in 01/2022 shows sinus rhythm with one episode of  atrial tachycardia, PVCs noted. Stress test in 01/2022 negative for  ischemia. IMPRESSION:  A 77-year-old female patient for LUIS to evaluate aortic  valve. We will make further recommendations based on that. ASA is III. Mallampati is III.         Gloria Vickers MD    D: 09/01/2022 9:32:51       T: 09/01/2022 13:25:12     [email or web address]  Job#: [id number]     Doc#: [id number]    CC:

## 2022-10-14 NOTE — TELEPHONE ENCOUNTER
----- Message from Derick De Jesus PA-C sent at 9/27/2019 12:59 PM EDT -----  Can we call and check to see if she is going to get the echo done? If it is normal we will be able to switch her to eliquis.   ----- Message -----  From: Christiane Loaiza MA  Sent: 8/29/2019   3:55 PM  To: Derick De Jesus PA-C        ----- Message -----  From: Derick De Jesus PA-C  Sent: 8/27/2019   2:42 PM  To: [facility]    Can we check on the cost of eliquis     No 1-2 drinks

## 2023-01-11 ENCOUNTER — TELEPHONE (OUTPATIENT)
Dept: NEUROSURGERY | Age: 73
End: 2023-01-11

## 2023-01-11 ENCOUNTER — OFFICE VISIT (OUTPATIENT)
Dept: ORTHOPEDIC SURGERY | Age: 73
End: 2023-01-11
Payer: COMMERCIAL

## 2023-01-11 VITALS
HEIGHT: 67 IN | OXYGEN SATURATION: 99 % | RESPIRATION RATE: 16 BRPM | WEIGHT: 209 LBS | HEART RATE: 61 BPM | SYSTOLIC BLOOD PRESSURE: 118 MMHG | DIASTOLIC BLOOD PRESSURE: 72 MMHG | BODY MASS INDEX: 32.8 KG/M2

## 2023-01-11 DIAGNOSIS — M41.9 SCOLIOSIS OF THORACOLUMBAR SPINE, UNSPECIFIED SCOLIOSIS TYPE: Primary | ICD-10-CM

## 2023-01-11 PROCEDURE — 1123F ACP DISCUSS/DSCN MKR DOCD: CPT | Performed by: PHYSICIAN ASSISTANT

## 2023-01-11 PROCEDURE — G8417 CALC BMI ABV UP PARAM F/U: HCPCS | Performed by: PHYSICIAN ASSISTANT

## 2023-01-11 PROCEDURE — G8399 PT W/DXA RESULTS DOCUMENT: HCPCS | Performed by: PHYSICIAN ASSISTANT

## 2023-01-11 PROCEDURE — G8484 FLU IMMUNIZE NO ADMIN: HCPCS | Performed by: PHYSICIAN ASSISTANT

## 2023-01-11 PROCEDURE — 1090F PRES/ABSN URINE INCON ASSESS: CPT | Performed by: PHYSICIAN ASSISTANT

## 2023-01-11 PROCEDURE — 3017F COLORECTAL CA SCREEN DOC REV: CPT | Performed by: PHYSICIAN ASSISTANT

## 2023-01-11 PROCEDURE — G8427 DOCREV CUR MEDS BY ELIG CLIN: HCPCS | Performed by: PHYSICIAN ASSISTANT

## 2023-01-11 PROCEDURE — 1036F TOBACCO NON-USER: CPT | Performed by: PHYSICIAN ASSISTANT

## 2023-01-11 PROCEDURE — 99202 OFFICE O/P NEW SF 15 MIN: CPT | Performed by: PHYSICIAN ASSISTANT

## 2023-01-11 ASSESSMENT — ENCOUNTER SYMPTOMS
GASTROINTESTINAL NEGATIVE: 1
RESPIRATORY NEGATIVE: 1
EYES NEGATIVE: 1
BACK PAIN: 1

## 2023-01-11 NOTE — PROGRESS NOTES
Review of Systems   Constitutional: Negative. HENT: Negative. Eyes: Negative. Respiratory: Negative. Cardiovascular: Negative. Gastrointestinal: Negative. Genitourinary: Negative. Musculoskeletal:  Positive for arthralgias and back pain. Skin: Negative. Negative for rash and wound. Neurological: Negative. Psychiatric/Behavioral: Negative. Daniela Terrell is a 67 y.o. female that presents the office today principally for low back pain and mid thoracic spine pain. She comes in today not having any pain in either hip but being referred from the nursing home for \"hip pain\". She states that her pain is actually in her back and has been in her back the whole time. She has seen multiple spine surgeons in the past but they do not want to operate on her as she has scoliosis.       Past Medical History:   Diagnosis Date    A-fib [facility])     ACS (acute coronary syndrome) ([facility] 6/16/2014    TRINO (acute kidney injury) ([facility] 5/21/2017    Anxiety disorder     Arthritis     Atrial fibrillation (HCC)     Blood transfusion     CAD (coronary artery disease)     \"have 6 heart stents- follows with Dr Ginny Arriaga    Cerebral artery occlusion with cerebral infarction [facility])     CHF (congestive heart failure) ([address]     pt. denies this dx    Coronary syndrome, acute ([address]     lesion of lad    Depression     Diabetes mellitus ([address]     \"dx 2 yr ago\"    Dysphagia     pt is unsure about this dx with phone assessment 3/6/2017    Fall     \"fell first week of YYTKR(4120)- reaching glass of water and fell out of bed\"\"have some bruising(on Plavix and Coumadin\" but did not hurt myself\"    Hyperlipidemia     Hypertension     Lumbar disc herniation     following with Dr Micki Rangel- they have to remove the reveal monitor to get a MRI of the back\"    Nausea & vomiting     Parkinson's disease ([address]     Reflux        Past Surgical History:   Procedure Laterality Date    [facility] removal of fibrosis\"right breast\"    CHOLECYSTECTOMY  1986    COLONOSCOPY  2012    CORONARY ANGIOPLASTY WITH STENT PLACEMENT      X 2\"last time was 4-5 yrs ago\"\"total a total of 6 heart stents\"    HYSTERECTOMY (CERVIX STATUS UNKNOWN)  1994    \"took everything\"    OTHER SURGICAL HISTORY  2010    reveal monitor insertion-removed in 2017    TONSILLECTOMY  1955       Family History   Problem Relation Age of Onset    Heart Disease Mother     High Blood Pressure Mother     Heart Disease Father        Social History     Socioeconomic History    Marital status:      Spouse name: None    Number of children: None    Years of education: None    Highest education level: None   Tobacco Use    Smoking status: Never    Smokeless tobacco: Never   Vaping Use    Vaping Use: Never used   Substance and Sexual Activity    Alcohol use: No    Drug use: No       Current Outpatient Medications   Medication Sig Dispense Refill    hydrALAZINE (APRESOLINE) 25 MG tablet Take 1 tablet by mouth 3 times daily 90 tablet 3    ibuprofen (ADVIL;MOTRIN) 600 MG tablet Take 1 tablet by mouth every 6 hours as needed for Pain 30 tablet 0    methocarbamol (ROBAXIN) 500 MG tablet Take 1 tablet by mouth 4 times daily As needed for muscle spasm. 20 tablet 0    baclofen (LIORESAL) 20 MG tablet Take 20 mg by mouth 3 times daily      valsartan (DIOVAN) 320 MG tablet Take 320 mg by mouth daily      gabapentin (NEURONTIN) 300 MG capsule Take 300 mg by mouth 3 times daily. pantoprazole (PROTONIX) 20 MG tablet Take 20 mg by mouth daily      LORazepam (ATIVAN) 0.5 MG tablet Take 0.5 mg by mouth 2 times daily.       zolpidem (AMBIEN) 10 MG tablet Take by mouth nightly as needed for Sleep.      furosemide (LASIX) 20 MG tablet Take 40 mg by mouth daily as needed       warfarin (COUMADIN) 4 MG tablet 1 tab by mouth once daily (Patient taking differently: 4mg Sunday and Wednesday) 15 tablet 0    albuterol sulfate  (90 Base) MCG/ACT inhaler Inhale 2 puffs into the lungs 4 times daily as needed for Wheezing 3 Inhaler 1    ondansetron (ZOFRAN ODT) 4 MG disintegrating tablet Take 1 tablet by mouth every 8 hours as needed for Nausea 15 tablet 0    carvedilol (COREG) 12.5 MG tablet Take 1 tablet by mouth 2 times daily (with meals) Hold for HR< 60 or SBP < 120 60 tablet 0    amLODIPine (NORVASC) 10 MG tablet Take 1 tablet by mouth nightly Hold for SBP < 130 30 tablet 0    donepezil (ARICEPT) 10 MG tablet Take 10 mg by mouth nightly      potassium chloride (KLOR-CON) 10 MEQ extended release tablet Take 10 mEq by mouth daily       QUEtiapine (SEROQUEL) 100 MG tablet Take 100 mg by mouth nightly       PARoxetine (PAXIL) 10 MG tablet Take 20 mg by mouth every morning       acetaminophen (TYLENOL) 325 MG tablet Take 2 tablets by mouth every 4 hours as needed for Pain 120 tablet 3    warfarin (COUMADIN) 1 MG tablet Take 3 tablets by mouth daily Adjust dosing based on INR (Patient taking differently: Take 2 mg by mouth See Admin Instructions Adjust dosing based on INR  4mg on Sun & Wed; 2mg all other days) 30 tablet 3    metFORMIN (GLUCOPHAGE) 500 MG tablet Take 500 mg by mouth daily (with breakfast)      nitroGLYCERIN (NITROSTAT) 0.4 MG SL tablet Place 1 tablet under the tongue every 5 minutes as needed for Chest pain. 25 tablet 3    clopidogrel (PLAVIX) 75 MG tablet Take 75 mg by mouth nightly        No current facility-administered medications for this visit. Allergies   Allergen Reactions    Tetracyclines & Related Other (See Comments)     hallucinations    Bactrim [Sulfamethoxazole-Trimethoprim] Diarrhea    Codeine Hives       Review of Systems:  See above      Physical Exam:   /72 (Site: Right Upper Arm, Position: Sitting, Cuff Size: Medium Adult)   Pulse 61   Resp 16   Ht 5' 7\" (1.702 m)   Wt 209 lb (94.8 kg)   SpO2 99%   BMI 32.73 kg/m²        Gait is Normal.   Gen/Psych:Examination reveals a pleasant individual in no acute distress.  The patient is oriented to time, place and person. The patient's mood and affect are appropriate. Lymph: The lymphatic examination bilaterally reveals all areas to be without enlargement or induration. Skin intact without lymphadenopathy, discoloration, or abnormal temperature. Vascular: There is intact, symmetric circulation in both lower extremities. Bilateral hips. Pain over the lower lumbar spine and over the mid thoracic spine. No pain with range of motion of either hip.  45 degrees external rotation, 30 degrees internal rotation. Impression:      ICD-10-CM    1. Scoliosis of thoracolumbar spine, unspecified scoliosis type  M41.9 Autumn Bentley PA-C, Neurosurgery, [place]     XR LUMBAR SPINE (MIN 4 VIEWS)     XR THORACIC SPINE (3 VIEWS)              Plan:    X-rays of thoracic and lumbar spine ordered today. I explained to her that if this is her back causing the pain that we do not treat backs but I would get her some x-rays and referred to neurosurgery. Patient Instructions   Continue to weight bear as tolerated  Continue range of motion  Follow up as needed with Orthopedics  Follow up with Jason Royal PA-C Neurosurgery    We are committed to providing you the best care possible. If you receive a survey after visiting one of our offices, please take time to share your experience concerning your physician office visit. These surveys are confidential and no health information about you is shared. We are eager to improve for you and we are counting on your feedback to help make that happen.

## 2023-01-11 NOTE — TELEPHONE ENCOUNTER
When calling patient for referral please call patient and son at [phone]  Please and Thank you!

## 2023-01-11 NOTE — TELEPHONE ENCOUNTER
Received a referral from Davon Mendoza PA-C for scoliosis of thoracolumbar spine. Patient seen by Briana Betts in the office today and he has ordered Xrays of thoracic and lumbar spine. Please advise - thank you.

## 2023-01-11 NOTE — PATIENT INSTRUCTIONS
Continue to weight bear as tolerated  Continue range of motion  Follow up as needed with Orthopedics  Follow up with Jazmin Yin PA-C Neurosurgery    We are committed to providing you the best care possible. If you receive a survey after visiting one of our offices, please take time to share your experience concerning your physician office visit. These surveys are confidential and no health information about you is shared. We are eager to improve for you and we are counting on your feedback to help make that happen.

## 2023-01-12 NOTE — TELEPHONE ENCOUNTER
Spoke to patient;s son Mariana Richey [employer] [facility]) @ [phone]. Appointment scheduled on Tuesday, 1/17/23 @ 1 pm with Jhonathan Holley PA-C. Location of appointment given to Mariana Richey. Patient also aware that XR Thoracolumbar Spine needs done prior to appointment (by 1/14/23). Patient's son agreeable and verbalized understanding. Nothing further needed at this time. Patient's son states he will call back to reschedule if patient is not up to getting xray done by 1/14/23. Resides at [facility]. Son states her back pain is off/on.

## 2023-01-13 ENCOUNTER — HOSPITAL ENCOUNTER (OUTPATIENT)
Dept: GENERAL RADIOLOGY | Age: 73
Discharge: HOME | End: 2023-01-13
Payer: COMMERCIAL

## 2023-01-13 ENCOUNTER — HOSPITAL ENCOUNTER (OUTPATIENT)
Age: 73
Discharge: HOME | End: 2023-01-13
Payer: COMMERCIAL

## 2023-01-13 DIAGNOSIS — M41.9 SCOLIOSIS OF THORACOLUMBAR SPINE, UNSPECIFIED SCOLIOSIS TYPE: ICD-10-CM

## 2023-01-13 PROCEDURE — 72110 X-RAY EXAM L-2 SPINE 4/>VWS: CPT

## 2023-01-13 PROCEDURE — 72072 X-RAY EXAM THORAC SPINE 3VWS: CPT

## 2023-01-16 ENCOUNTER — TELEPHONE (OUTPATIENT)
Dept: NEUROSURGERY | Age: 73
End: 2023-01-16

## 2023-01-16 NOTE — PATIENT INSTRUCTIONS
Notify the neurosurgical office urgently if you begin to develop any numbness or tingling in extremities, weakness in extremities, or loss of bowel or bladder control. Schedule an appointment with pain management.     [facility]  Dr Gonzales [facility]  [phone]

## 2023-01-16 NOTE — TELEPHONE ENCOUNTER
Spoke to patient regarding upcoming appointment with Reed Borja PA-C on Tuesday, 1/17/23 @ 1 pm.     This appointment has been confirmed.

## 2023-01-16 NOTE — PROGRESS NOTES
Patient is a 67 y.o. y.o. female who presents to the office today as a new patient for an evaluation of her low back pain and mid-thoracic spine pain (scoliosis of thoracolumbar spine). Patient referred by Brandee Romo PA-C. Patient has seen multiple spine surgeons in the past but they do not want to operate on her as she has scoliosis. Pain scale 7/10 today. States she is taking Percocet  for the pain with mild relief. States she has numbness, tingling and weakness in her BLE that radiates to her hips. She does state she has neuropathy as well and has trouble with her balance. Patient states she had a bladder scan at her nursing facility this morning because she was having trouble urinating. Patient denies any previous surgeries on her back. She has seen pain management for injections in the past.    Patient has been ambulating ambulating devices: with a wheeled walker. XR Thoracolumbar Spine 1/13/23  Impression   Osteopenia with progressive levocurvature thoracolumbar spine with worsening   disc space narrowing particularly left of midline at the L3-4 level and   associated facet arthritis. No acute osseous injury of the thoracic lumbar   spine is seen. MRI Lumbar Spine w/out Contrast 9/10/21  Impression    Fairly advanced scoliotic curvature is noted in the lumbar   spine. There are degenerative disc changes throughout the lumbar disc   spaces. There is a fairly severe acquired type spinal stenosis most   significant at the level of L3-4. There is osteoarthritis in the lumbar   facet joints.

## 2023-01-17 ENCOUNTER — OFFICE VISIT (OUTPATIENT)
Dept: NEUROSURGERY | Age: 73
End: 2023-01-17
Payer: COMMERCIAL

## 2023-01-17 VITALS
SYSTOLIC BLOOD PRESSURE: 126 MMHG | OXYGEN SATURATION: 98 % | WEIGHT: 172.5 LBS | HEART RATE: 69 BPM | DIASTOLIC BLOOD PRESSURE: 62 MMHG | BODY MASS INDEX: 27.07 KG/M2 | HEIGHT: 67 IN | RESPIRATION RATE: 16 BRPM

## 2023-01-17 DIAGNOSIS — M41.85 OTHER FORM OF SCOLIOSIS OF THORACOLUMBAR SPINE: Primary | ICD-10-CM

## 2023-01-17 PROCEDURE — G8417 CALC BMI ABV UP PARAM F/U: HCPCS | Performed by: PHYSICIAN ASSISTANT

## 2023-01-17 PROCEDURE — G8399 PT W/DXA RESULTS DOCUMENT: HCPCS | Performed by: PHYSICIAN ASSISTANT

## 2023-01-17 PROCEDURE — 99203 OFFICE O/P NEW LOW 30 MIN: CPT | Performed by: PHYSICIAN ASSISTANT

## 2023-01-17 PROCEDURE — G8484 FLU IMMUNIZE NO ADMIN: HCPCS | Performed by: PHYSICIAN ASSISTANT

## 2023-01-17 PROCEDURE — 1090F PRES/ABSN URINE INCON ASSESS: CPT | Performed by: PHYSICIAN ASSISTANT

## 2023-01-17 PROCEDURE — 3017F COLORECTAL CA SCREEN DOC REV: CPT | Performed by: PHYSICIAN ASSISTANT

## 2023-01-17 PROCEDURE — 1123F ACP DISCUSS/DSCN MKR DOCD: CPT | Performed by: PHYSICIAN ASSISTANT

## 2023-01-17 PROCEDURE — 1036F TOBACCO NON-USER: CPT | Performed by: PHYSICIAN ASSISTANT

## 2023-01-17 PROCEDURE — G8427 DOCREV CUR MEDS BY ELIG CLIN: HCPCS | Performed by: PHYSICIAN ASSISTANT

## 2023-01-17 RX ORDER — GUAIFENESIN 600 MG/1
600 TABLET, EXTENDED RELEASE ORAL 2 TIMES DAILY
COMMUNITY
Start: 2022-12-27

## 2023-01-17 RX ORDER — DOCUSATE SODIUM 100 MG/1
100 CAPSULE, LIQUID FILLED ORAL 2 TIMES DAILY
COMMUNITY
Start: 2023-01-12

## 2023-01-17 RX ORDER — APIXABAN 5 MG/1
5 TABLET, FILM COATED ORAL 2 TIMES DAILY
COMMUNITY
Start: 2023-01-15

## 2023-01-17 RX ORDER — FUROSEMIDE 40 MG/1
40 TABLET ORAL DAILY
COMMUNITY
Start: 2023-01-02

## 2023-01-17 RX ORDER — CARBIDOPA/LEVODOPA 25MG-250MG
2 TABLET ORAL DAILY
COMMUNITY
Start: 2022-12-28

## 2023-01-17 RX ORDER — ALPRAZOLAM 0.5 MG/1
0.5 TABLET ORAL 2 TIMES DAILY
COMMUNITY
Start: 2023-01-03

## 2023-01-17 RX ORDER — CARBAMIDE PEROXIDE 65 MG/ML
5 LIQUID TOPICAL 2 TIMES DAILY
COMMUNITY
Start: 2023-01-14

## 2023-01-17 RX ORDER — BACLOFEN 10 MG/1
10 TABLET ORAL 3 TIMES DAILY
COMMUNITY
Start: 2023-01-08

## 2023-01-17 RX ORDER — OXYCODONE AND ACETAMINOPHEN 10; 325 MG/1; MG/1
1 TABLET ORAL 4 TIMES DAILY
COMMUNITY
Start: 2023-01-09

## 2023-01-17 NOTE — PROGRESS NOTES
Neurosurgery Office Note    Chief Complaint: Mid to low back pain    HPI:  67 y.o. 1950  Who presents today with complaints of mid to low back pain that has been progressive over the past year. She reports that she has scoliosis, she was not diagnosed with this as a child but was diagnosed with that when she was around 27years old. She has had off-and-on back pain since but has steady worsening of her pain over the past year. She resides at an assisted living facility. She does ambulate with a wheeled walker, states that she feels she is losing mobility and having worsening weakness in her legs. She has had physical therapy, recently completed it at her assisted living facility. She also reports having injections at a [facility] about a year and a half ago with no lasting relief. She has seen many surgeons in [place] where she originally resided until about it year and a half ago. She states that they do not want to operate on her given the severity of her scoliosis. She states that she has neuropathy and does attest to numbness and tingling in her legs that extend up to her hips. She reports worsening gait disturbances. and patient is on warfarin and ASA. PMHx positive for A. fib, MI, CAD, history of stroke, CHF, diabetes, hyperlipidemia, hypertension, Parkinson's disease. Past Surgical history positive for appendectomy, breast surgery, cholecystectomy, coronary angioplasty with stent placement.                Past Medical and Surgical History:       Diagnosis Date    A-fib [facility])     ACS (acute coronary syndrome) ([facility] 6/16/2014    TRINO (acute kidney injury) ([facility] 5/21/2017    Anxiety disorder     Arthritis     Atrial fibrillation ([facility]     Blood transfusion     CAD (coronary artery disease)     \"have 6 heart stents- follows with Dr Lucia Montiel    Cerebral artery occlusion with cerebral infarction [facility])     CHF (congestive heart failure) ([facility]     pt. denies this dx    Coronary syndrome, acute ([facility]     lesion of lad    Depression     Diabetes mellitus ([facility]     \"dx 2 yr ago\"    Dysphagia     pt is unsure about this dx with phone assessment 3/6/2017    Fall     \"fell first week of FOOFW(0475)- reaching glass of water and fell out of bed\"\"have some bruising(on Plavix and Coumadin\" but did not hurt myself\"    Hyperlipidemia     Hypertension     Lumbar disc herniation     following with Dr Yassine Alberto- they have to remove the reveal monitor to get a MRI of the back\"    Nausea & vomiting     Parkinson's disease ([facility]     Reflux          Procedure Laterality Date    [facility]    removal of fibrosis\"right breast\"    [address]  2012    CORONARY ANGIOPLASTY WITH STENT PLACEMENT      X 2\"last time was 4-5 yrs ago\"\"total a total of 6 heart stents\"    HYSTERECTOMY ([facility])  1994    \"took everything\"    OTHER SURGICAL HISTORY  2010    reveal monitor insertion-removed in 2017    TONSILLECTOMY  1955       Social History:    TOBACCO:   reports that she has never smoked. She has never used smokeless tobacco.  ETOH:   reports no history of alcohol use. Family History:       Problem Relation Age of Onset    Heart Disease Mother     High Blood Pressure Mother     Heart Disease Father        Current Medications:    No current facility-administered medications for this visit. Allergies   Allergen Reactions    Morphine Hallucinations    Tetracyclines & Related Other (See Comments)     hallucinations    Bactrim [Sulfamethoxazole-Trimethoprim] Diarrhea    Codeine Hives        REVIEW OF SYSTEMS:    CONSTITUTIONAL:  negative for fevers, chills, diaphoresis, activity change, appetite change, fatigue, night sweats and unexpected weight change.    EYES:  negative for blurred vision, eye discharge, visual disturbance and icterus  HEENT:  negative for hearing loss, tinnitus, ear drainage, sinus pressure, nasal congestion, epistaxis and snoring  RESPIRATORY:  No cough, shortness of breath, hemoptysis  GASTROINTESTINAL:  negative for nausea, vomiting, diarrhea, constipation, blood in stool and abdominal pain  GENITOURINARY:  negative for frequency, dysuria, urinary incontinence, decreased urine volume, and hematuria  HEMATOLOGIC/LYMPHATIC:  negative for easy bruising, bleeding and lymphadenopathy  MUSCULOSKELETAL:  positive for back pain, joint swelling, decreased range of motion and muscle weakness  NEUROLOGICAL:  negative for headaches, slurred speech, unilateral weakness  PSYCHIATRIC/BEHAVIORAL: negative for hallucinations, behavioral problems, confusion and agitation. Objective:   PHYSICAL EXAM:      VITALS:  /62 (Site: Left Upper Arm, Position: Sitting, Cuff Size: Medium Adult)   Pulse 69   Resp 16   Ht 5' 7\" (1.702 m)   Wt 172 lb 8 oz (78.2 kg)   SpO2 98%   BMI 27.02 kg/m²      24HR INTAKE/OUTPUT:  No intake or output data in the 24 hours ending 01/19/23 1323  CONSTITUTIONAL:  Awake, alert, cooperative, no apparent distress, and appears stated age  HEENT: NCAT, PERRL, EOMI. Sclera white, conjunctive full. OP with moist mucosal membranes, no thrush, tongue protrudes midline  NECK:  Supple, symmetrical, trachea midline, no adenopathy  PSYCHIATRIC: Oriented to person place and time. No obvious depression or anxiety. MUSCULOSKELETAL: No obvious misalignment or effusion of the joints. No clubbing, cyanosis of the digits. SKIN:  normal skin color, texture, turgor and no redness, warmth, or swelling.    NEUROLOGIC: Alert and oriented x4, face symmetrical, no obvious droop, speech clear and coherent, tingling present in bilateral feet, steady but antalgic gait with wheeled walker  Upper extremity strength: Bilateral upper extremities able to resist gravity, bilateral handgrip 5/5  Lower extremity strength: Bilateral lower extremities 5/5 throughout, bilateral patellar DTR 2+    DATA:    Old records have been reviewed      Radiology Review:  All pertinent images / reports were reviewed as a part of this visit. X-ray thoracic and lumbar spine 1/13/23  Impression   Osteopenia with progressive levocurvature thoracolumbar spine with worsening   disc space narrowing particularly left of midline at the L3-4 level and   associated facet arthritis. No acute osseous injury of the thoracic lumbar   spine is seen. MRI Lumbar Spine w/out Contrast 9/10/21  Impression     Fairly advanced scoliotic curvature is noted in the lumbar   spine. There are degenerative disc changes throughout the lumbar disc   spaces. There is a fairly severe acquired type spinal stenosis most   significant at the level of L3-4. There is osteoarthritis in the lumbar   facet joints. Assessment and plan:       1. Other form of scoliosis of thoracolumbar spine   -Patient who presents with worsening back pain over the past year. Patient states that she has had chronic back pain for many years. She was diagnosed with scoliosis when she was 27. She has tried physical therapy and injections in her back roughly a year and a half ago with no lasting relief. Her back pain has worsened. I did review her x-rays with her and her son. She does have osteopenia and significant levoscoliosis centered at L2-3. She has worsening disc space narrowing at L3-4. She takes percocet for her pain with minimal relief. On previous MRI imaging in 2021 she is noted to have significant central canal stenosis at L3-4. Patient has met with numerous surgeons in the past who have not opted to perform surgical intervention given her advanced scoliosis. I did explain to her and her son that stabilization surgery would be very invasive. Patient states that she is not interested at this time for further work-up for surgery. I did offer to send her to either Dr. Fartun Sandhu or Dr. Barbara Blanchard or neurosurgery at [facility] given the complexity of her case. She states that she does not want to proceed at this time.   She does complain of some neck pain. She has not seen pain management in over a year and a half. I did send a referral at her request to Dr. Abdifatah Sims for lumbar injections and potential cervical trigger point injections. Patient does not have any neck imaging, I did offer to order x-rays and she has declined at this time. She understands that the pain clinic will most likely order an MRI of the lumbar spine. I did offer to order an MRI of the lumbar spine but she again declines at this time. She understands that she can call my office back anytime and I would be happy to order this for her to prepare for her consultation with pain management. -     External Referral To Pain Clinic       Next steps: Follow-up with Dr. Abdifatah Sims. Notify office if you would like referral sent to [facility] for further work-up. Notify office if you would like follow-up for neck pain. Electronically signed by Lori Houston PA-C on 1/19/2023 at 1:23 PM      Supervising physician: Lawanda Mena. Erum Cade MD.  Dr. Erum Cade was readily and continuously available by phone for direct consultation regarding the care of this patient. Time spent with patient in consultation, education, and collaboration with medical time is >50% of total time spent on case, including time spent in chart review and dictation. Total time spent: 30 minutes    Thank you for the opportunity to participate in the care of your patient.

## 2023-01-18 ENCOUNTER — CLINICAL DOCUMENTATION (OUTPATIENT)
Dept: NEUROSURGERY | Age: 73
End: 2023-01-18

## 2023-01-18 NOTE — PROGRESS NOTES
Thoracolumbar xray results - DOS 1/13/23 faxed to [facility] at [phone] Attn: Afua per patient request.    Updated patient medication list received from [facility]. Scanned into media.

## 2023-01-19 ENCOUNTER — CLINICAL DOCUMENTATION (OUTPATIENT)
Dept: NEUROSURGERY | Age: 73
End: 2023-01-19

## 2023-01-19 RX ORDER — TAMSULOSIN HYDROCHLORIDE 0.4 MG/1
0.4 CAPSULE ORAL DAILY
COMMUNITY
Start: 2022-12-28

## 2023-01-19 RX ORDER — TRAZODONE HYDROCHLORIDE 50 MG/1
50 TABLET ORAL NIGHTLY
COMMUNITY
Start: 2023-01-01

## 2023-01-19 RX ORDER — POTASSIUM CHLORIDE 750 MG/1
10 CAPSULE, EXTENDED RELEASE ORAL 2 TIMES DAILY
COMMUNITY
Start: 2023-01-15

## 2023-01-19 RX ORDER — PREGABALIN 75 MG/1
75 CAPSULE ORAL 2 TIMES DAILY
COMMUNITY
Start: 2023-01-03

## 2023-01-19 RX ORDER — NITROGLYCERIN 80 MG/1
1 PATCH TRANSDERMAL DAILY
COMMUNITY
Start: 2022-12-16

## 2023-01-19 RX ORDER — DULOXETIN HYDROCHLORIDE 30 MG/1
30 CAPSULE, DELAYED RELEASE ORAL DAILY
COMMUNITY

## 2023-01-19 RX ORDER — AMLODIPINE BESYLATE 10 MG/1
10 TABLET ORAL DAILY
COMMUNITY

## 2023-01-19 RX ORDER — PAROXETINE HYDROCHLORIDE 40 MG/1
40 TABLET, FILM COATED ORAL EVERY MORNING
COMMUNITY
Start: 2022-12-28

## 2023-01-19 RX ORDER — FOLIC ACID 1 MG/1
1 TABLET ORAL DAILY
COMMUNITY
Start: 2023-01-16

## 2023-01-19 RX ORDER — PANTOPRAZOLE SODIUM 20 MG/1
20 TABLET, DELAYED RELEASE ORAL DAILY
COMMUNITY

## 2023-01-19 RX ORDER — LIDOCAINE 4 G/G
1 PATCH TOPICAL DAILY
COMMUNITY

## 2023-01-19 RX ORDER — MELOXICAM 15 MG/1
7.5 TABLET ORAL DAILY
COMMUNITY
Start: 2022-12-29

## 2023-01-19 RX ORDER — POLYETHYLENE GLYCOL 3350 17 G/17G
17 POWDER ORAL 2 TIMES DAILY
COMMUNITY
Start: 2023-01-09

## 2023-01-19 NOTE — PROGRESS NOTES
Letter sent to referring provider Arcelia Erickson PA-C via In[employer] ([employer]) - New patient visit 1/17/23.

## 2023-01-19 NOTE — LETTER
[facility]  [facility] [address] [address]  Phone: [phone]  Fax: [phone]           Tima Tong      January 19, 2023     Patient: Therese Patricio   MR Number: [id number]   YOB: 1950   Date of Visit: 1/17/2023       Dear Bing Schrader PA-C:    Thank you for referring Saida Bray to me for evaluation/treatment. The relevant portions of my assessment and plan of care are available for your review in Epic. If you have questions, please do not hesitate to call me. I look forward to following Alejandrina Potts along with you.         Sincerely,        Santos Hernandez PA-C

## 2023-08-06 NOTE — CARE COORDINATION
.CM met with pt for d/c planning. Introduced self and updated white board. Pt lives alone and is independent with ADL's. Pt's son provides her transportation. She has a PCP, has insurance, and is able to afford medication. Pt has a walker, w/c, cane, shower seat, and a hospital bed. [facility] offered and pt refused. Pt denies any needs at this time. D/c plan is home alone, no needs. CM will continue to follow.   TE
Sent a message to Dr Elin Mckoy via PS informing him that pt has been cleared by GI and Cardiac and is a 4 day OBS.   TE
show

## 2023-10-12 ENCOUNTER — OFFICE (OUTPATIENT)
Dept: URBAN - METROPOLITAN AREA CLINIC 18 | Facility: CLINIC | Age: 73
End: 2023-10-12

## 2023-10-12 VITALS
HEART RATE: 105 BPM | HEIGHT: 68 IN | SYSTOLIC BLOOD PRESSURE: 128 MMHG | DIASTOLIC BLOOD PRESSURE: 78 MMHG | WEIGHT: 174 LBS

## 2023-10-12 DIAGNOSIS — R13.10 DYSPHAGIA, UNSPECIFIED: ICD-10-CM

## 2023-10-12 PROCEDURE — 99204 OFFICE O/P NEW MOD 45 MIN: CPT | Performed by: INTERNAL MEDICINE

## 2023-10-17 ENCOUNTER — AMBULATORY SURGICAL CENTER (OUTPATIENT)
Dept: URBAN - METROPOLITAN AREA SURGERY 7 | Facility: SURGERY | Age: 73
End: 2023-10-17

## 2023-10-17 ENCOUNTER — OFFICE (OUTPATIENT)
Dept: URBAN - METROPOLITAN AREA PATHOLOGY 1 | Facility: PATHOLOGY | Age: 73
End: 2023-10-17

## 2023-10-17 VITALS
HEART RATE: 59 BPM | RESPIRATION RATE: 16 BRPM | DIASTOLIC BLOOD PRESSURE: 76 MMHG | HEART RATE: 70 BPM | OXYGEN SATURATION: 100 % | SYSTOLIC BLOOD PRESSURE: 134 MMHG | OXYGEN SATURATION: 99 % | SYSTOLIC BLOOD PRESSURE: 138 MMHG | SYSTOLIC BLOOD PRESSURE: 134 MMHG | SYSTOLIC BLOOD PRESSURE: 155 MMHG | OXYGEN SATURATION: 100 % | HEART RATE: 64 BPM | HEART RATE: 70 BPM | HEIGHT: 68 IN | TEMPERATURE: 97.5 F | DIASTOLIC BLOOD PRESSURE: 76 MMHG | RESPIRATION RATE: 10 BRPM | OXYGEN SATURATION: 98 % | SYSTOLIC BLOOD PRESSURE: 146 MMHG | HEART RATE: 60 BPM | DIASTOLIC BLOOD PRESSURE: 43 MMHG | DIASTOLIC BLOOD PRESSURE: 46 MMHG | OXYGEN SATURATION: 98 % | RESPIRATION RATE: 16 BRPM | SYSTOLIC BLOOD PRESSURE: 155 MMHG | TEMPERATURE: 97.5 F | SYSTOLIC BLOOD PRESSURE: 131 MMHG | WEIGHT: 174 LBS | DIASTOLIC BLOOD PRESSURE: 46 MMHG | DIASTOLIC BLOOD PRESSURE: 58 MMHG | RESPIRATION RATE: 10 BRPM | HEART RATE: 65 BPM | HEART RATE: 59 BPM | SYSTOLIC BLOOD PRESSURE: 131 MMHG | HEIGHT: 68 IN | WEIGHT: 174 LBS | DIASTOLIC BLOOD PRESSURE: 68 MMHG | HEART RATE: 64 BPM | HEART RATE: 60 BPM | HEART RATE: 65 BPM | OXYGEN SATURATION: 99 % | SYSTOLIC BLOOD PRESSURE: 146 MMHG | DIASTOLIC BLOOD PRESSURE: 68 MMHG | DIASTOLIC BLOOD PRESSURE: 43 MMHG | SYSTOLIC BLOOD PRESSURE: 138 MMHG | DIASTOLIC BLOOD PRESSURE: 58 MMHG

## 2023-10-17 DIAGNOSIS — K25.9 GASTRIC ULCER, UNSPECIFIED AS ACUTE OR CHRONIC, WITHOUT HEMO: ICD-10-CM

## 2023-10-17 DIAGNOSIS — R13.10 DYSPHAGIA, UNSPECIFIED: ICD-10-CM

## 2023-10-17 PROCEDURE — 88342 IMHCHEM/IMCYTCHM 1ST ANTB: CPT | Performed by: PATHOLOGY

## 2023-10-17 PROCEDURE — 43239 EGD BIOPSY SINGLE/MULTIPLE: CPT | Performed by: INTERNAL MEDICINE

## 2023-10-17 PROCEDURE — 43450 DILATE ESOPHAGUS 1/MULT PASS: CPT | Performed by: INTERNAL MEDICINE

## 2023-10-17 PROCEDURE — 88305 TISSUE EXAM BY PATHOLOGIST: CPT | Performed by: PATHOLOGY

## 2023-10-17 RX ORDER — PANTOPRAZOLE 40 MG/1
40 TABLET, DELAYED RELEASE ORAL
Qty: 60 | Refills: 2 | Status: ACTIVE

## 2023-10-20 LAB
PDF: PDF REPORT: (no result)
PDF: PDF REPORT: (no result)

## 2025-01-09 ENCOUNTER — HOSPITAL ENCOUNTER (INPATIENT)
Age: 75
LOS: 3 days | Discharge: HOME | DRG: 812 | End: 2025-01-12
Attending: STUDENT IN AN ORGANIZED HEALTH CARE EDUCATION/TRAINING PROGRAM | Admitting: STUDENT IN AN ORGANIZED HEALTH CARE EDUCATION/TRAINING PROGRAM
Payer: COMMERCIAL

## 2025-01-09 ENCOUNTER — APPOINTMENT (OUTPATIENT)
Dept: GENERAL RADIOLOGY | Age: 75
DRG: 812 | End: 2025-01-09
Payer: COMMERCIAL

## 2025-01-09 DIAGNOSIS — E87.5 HYPERKALEMIA: ICD-10-CM

## 2025-01-09 DIAGNOSIS — D64.9 ANEMIA, UNSPECIFIED TYPE: Primary | ICD-10-CM

## 2025-01-09 DIAGNOSIS — N17.9 AKI (ACUTE KIDNEY INJURY) (HCC): ICD-10-CM

## 2025-01-09 LAB
ALBUMIN SERPL-MCNC: 4 G/DL (ref 3.4–5)
ALBUMIN/GLOB SERPL: 1.8 {RATIO} (ref 1.1–2.2)
ALP SERPL-CCNC: 111 U/L (ref 40–129)
ALT SERPL-CCNC: <5 U/L (ref 10–40)
ANION GAP SERPL CALCULATED.3IONS-SCNC: 10 MMOL/L (ref 9–17)
AST SERPL-CCNC: 17 U/L (ref 15–37)
BASOPHILS # BLD: 0.08 K/UL
BASOPHILS NFR BLD: 1 % (ref 0–1)
BILIRUB SERPL-MCNC: <0.2 MG/DL (ref 0–1)
BUN SERPL-MCNC: 49 MG/DL (ref 7–20)
CALCIUM SERPL-MCNC: 8.8 MG/DL (ref 8.3–10.6)
CHLORIDE SERPL-SCNC: 108 MMOL/L (ref 99–110)
CO2 SERPL-SCNC: 23 MMOL/L (ref 21–32)
CREAT SERPL-MCNC: 1.8 MG/DL (ref 0.6–1.2)
EOSINOPHIL # BLD: 1.13 K/UL
EOSINOPHILS RELATIVE PERCENT: 12 % (ref 0–3)
ERYTHROCYTE [DISTWIDTH] IN BLOOD BY AUTOMATED COUNT: 19.3 % (ref 11.7–14.9)
GFR, ESTIMATED: 27 ML/MIN/1.73M2
GLUCOSE SERPL-MCNC: 90 MG/DL (ref 74–99)
HCT VFR BLD AUTO: 16.9 % (ref 37–47)
HGB BLD-MCNC: 4.7 G/DL (ref 12.5–16)
IMM GRANULOCYTES # BLD AUTO: 0.04 K/UL
IMM GRANULOCYTES NFR BLD: 0 %
LYMPHOCYTES NFR BLD: 1.84 K/UL
LYMPHOCYTES RELATIVE PERCENT: 20 % (ref 24–44)
MCH RBC QN AUTO: 19 PG (ref 27–31)
MCHC RBC AUTO-ENTMCNC: 27.8 G/DL (ref 32–36)
MCV RBC AUTO: 68.1 FL (ref 78–100)
MONOCYTES NFR BLD: 0.71 K/UL
MONOCYTES NFR BLD: 8 % (ref 0–4)
NEUTROPHILS NFR BLD: 60 % (ref 36–66)
NEUTS SEG NFR BLD: 5.66 K/UL
PLATELET, FLUORESCENCE: 148 K/UL (ref 140–440)
POTASSIUM SERPL-SCNC: 5.8 MMOL/L (ref 3.5–5.1)
PROT SERPL-MCNC: 6.2 G/DL (ref 6.4–8.2)
RBC # BLD AUTO: 2.48 M/UL (ref 4.2–5.4)
SODIUM SERPL-SCNC: 141 MMOL/L (ref 136–145)
WBC OTHER # BLD: 9.5 K/UL (ref 4–10.5)

## 2025-01-09 PROCEDURE — 83540 ASSAY OF IRON: CPT

## 2025-01-09 PROCEDURE — 86900 BLOOD TYPING SEROLOGIC ABO: CPT

## 2025-01-09 PROCEDURE — 99285 EMERGENCY DEPT VISIT HI MDM: CPT

## 2025-01-09 PROCEDURE — 85025 COMPLETE CBC W/AUTO DIFF WBC: CPT

## 2025-01-09 PROCEDURE — 82607 VITAMIN B-12: CPT

## 2025-01-09 PROCEDURE — 86920 COMPATIBILITY TEST SPIN: CPT

## 2025-01-09 PROCEDURE — 86850 RBC ANTIBODY SCREEN: CPT

## 2025-01-09 PROCEDURE — 2580000003 HC RX 258: Performed by: PHYSICIAN ASSISTANT

## 2025-01-09 PROCEDURE — 83550 IRON BINDING TEST: CPT

## 2025-01-09 PROCEDURE — 82728 ASSAY OF FERRITIN: CPT

## 2025-01-09 PROCEDURE — 86901 BLOOD TYPING SEROLOGIC RH(D): CPT

## 2025-01-09 PROCEDURE — 82746 ASSAY OF FOLIC ACID SERUM: CPT

## 2025-01-09 PROCEDURE — P9016 RBC LEUKOCYTES REDUCED: HCPCS

## 2025-01-09 PROCEDURE — 80053 COMPREHEN METABOLIC PANEL: CPT

## 2025-01-09 PROCEDURE — 71045 X-RAY EXAM CHEST 1 VIEW: CPT

## 2025-01-09 PROCEDURE — 30233N1 TRANSFUSION OF NONAUTOLOGOUS RED BLOOD CELLS INTO PERIPHERAL VEIN, PERCUTANEOUS APPROACH: ICD-10-PCS | Performed by: INTERNAL MEDICINE

## 2025-01-09 PROCEDURE — 93005 ELECTROCARDIOGRAM TRACING: CPT | Performed by: PHYSICIAN ASSISTANT

## 2025-01-09 PROCEDURE — 36415 COLL VENOUS BLD VENIPUNCTURE: CPT

## 2025-01-09 PROCEDURE — 2140000000 HC CCU INTERMEDIATE R&B

## 2025-01-09 RX ORDER — SODIUM CHLORIDE 9 MG/ML
INJECTION, SOLUTION INTRAVENOUS PRN
Status: DISCONTINUED | OUTPATIENT
Start: 2025-01-09 | End: 2025-01-12 | Stop reason: HOSPADM

## 2025-01-09 RX ORDER — ALBUTEROL SULFATE 0.83 MG/ML
2.5 SOLUTION RESPIRATORY (INHALATION) ONCE
Status: COMPLETED | OUTPATIENT
Start: 2025-01-09 | End: 2025-01-10

## 2025-01-09 RX ORDER — 0.9 % SODIUM CHLORIDE 0.9 %
1000 INTRAVENOUS SOLUTION INTRAVENOUS ONCE
Status: COMPLETED | OUTPATIENT
Start: 2025-01-09 | End: 2025-01-10

## 2025-01-09 RX ADMIN — SODIUM CHLORIDE 1000 ML: 9 INJECTION, SOLUTION INTRAVENOUS at 20:19

## 2025-01-09 ASSESSMENT — LIFESTYLE VARIABLES
HOW OFTEN DO YOU HAVE A DRINK CONTAINING ALCOHOL: NEVER
HOW MANY STANDARD DRINKS CONTAINING ALCOHOL DO YOU HAVE ON A TYPICAL DAY: PATIENT DOES NOT DRINK

## 2025-01-09 ASSESSMENT — PAIN - FUNCTIONAL ASSESSMENT: PAIN_FUNCTIONAL_ASSESSMENT: NONE - DENIES PAIN

## 2025-01-09 NOTE — ED PROVIDER NOTES
EMERGENCY DEPARTMENT ENCOUNTER        Pt Name: Nerissa Brewster  MRN: [id number]  Birthdate 1950  Date of evaluation: 1/9/2025  Provider: Xiomy Hameed PA-C  PCP: Andres Henry MD    RONALDO. I have evaluated this patient.        Triage CHIEF COMPLAINT       Chief Complaint   Patient presents with    Abnormal Lab     Low hgb 4.7 from [place]. Patient states she has felt weak for a month and a half          HISTORY OF PRESENT ILLNESS      Chief Complaint: Anemia, generalized weakness    Nerissa Brewster is a 74 y.o. female who presents for anemia.  Patient sent in from [place] after she had outpatient labs that showed a Hgb of 4.7.  Patient says she has been feeling generally weak for about 1.5 months.  She says her legs were so weak they wouldn't hold her up this morning.  She has also felt short of breath.  She denies any known bleeding, including no bloody stools or black/tarry stools.  She is on Eliquis.  She denies any prior history of anemia.       Nursing Notes were all reviewed and agreed with or any disagreements were addressed in the HPI.    REVIEW OF SYSTEMS     CONSTITUTIONAL:  Denies fever.  EYES:  Denies visual changes.  HEAD:  Denies headache.  ENT:  Denies earache, nasal congestion, sore throat.  NECK:  Denies neck pain.  RESPIRATORY:  Denies any shortness of breath.  CARDIOVASCULAR:  Denies chest pain.  GI:  Denies nausea or vomiting.    :  Denies urinary symptoms.  MUSCULOSKELETAL:  Denies extremity pain or swelling.  BACK:  Denies back pain.  INTEGUMENT:  Denies skin changes.  LYMPHATIC:  Denies lymphadenopathy.  NEUROLOGIC:  Denies any numbness/tingling.  PSYCHIATRIC:  Denies SI/HI.    PAST MEDICAL HISTORY     Past Medical History:   Diagnosis Date    A-fib ([facility]     ACS (acute coronary syndrome) ([facility]) 6/16/2014    TRINO (acute kidney injury) ([facility]) 5/21/2017    Anxiety disorder     Arthritis     Atrial fibrillation (HCC)     Blood transfusion     CAD (coronary artery disease)

## 2025-01-09 NOTE — ED TRIAGE NOTES
Pt to the ED with weakness for 1.5 months and had hgb result of 4.7 today. Pt is coming from [place] and was sent to get a transfusion. Pt is alert and oriented, is not in acute distress, and denies any needs

## 2025-01-10 LAB
ANION GAP SERPL CALCULATED.3IONS-SCNC: 9 MMOL/L (ref 9–17)
BASOPHILS # BLD: 0.07 K/UL
BASOPHILS NFR BLD: 1 % (ref 0–1)
BUN SERPL-MCNC: 42 MG/DL (ref 7–20)
CALCIUM SERPL-MCNC: 8.8 MG/DL (ref 8.3–10.6)
CHLORIDE SERPL-SCNC: 110 MMOL/L (ref 99–110)
CO2 SERPL-SCNC: 21 MMOL/L (ref 21–32)
CREAT SERPL-MCNC: 1.6 MG/DL (ref 0.6–1.2)
EKG ATRIAL RATE: 70 BPM
EKG DIAGNOSIS: NORMAL
EKG P AXIS: 49 DEGREES
EKG P-R INTERVAL: 138 MS
EKG Q-T INTERVAL: 404 MS
EKG QRS DURATION: 84 MS
EKG QTC CALCULATION (BAZETT): 436 MS
EKG R AXIS: -5 DEGREES
EKG T AXIS: 21 DEGREES
EKG VENTRICULAR RATE: 70 BPM
EOSINOPHIL # BLD: 0.9 K/UL
EOSINOPHILS RELATIVE PERCENT: 12 % (ref 0–3)
ERYTHROCYTE [DISTWIDTH] IN BLOOD BY AUTOMATED COUNT: 22.4 % (ref 11.7–14.9)
FERRITIN SERPL-MCNC: 12 NG/ML (ref 15–150)
FOLATE SERPL-MCNC: 40 NG/ML (ref 4.8–24.2)
GFR, ESTIMATED: 32 ML/MIN/1.73M2
GLUCOSE SERPL-MCNC: 89 MG/DL (ref 74–99)
HCT VFR BLD AUTO: 20.5 % (ref 37–47)
HCT VFR BLD AUTO: 26.1 % (ref 37–47)
HGB BLD-MCNC: 5.9 G/DL (ref 12.5–16)
HGB BLD-MCNC: 7.7 G/DL (ref 12.5–16)
IMM GRANULOCYTES # BLD AUTO: 0.04 K/UL
IMM GRANULOCYTES NFR BLD: 1 %
IRON SATN MFR SERPL: 2 % (ref 15–50)
IRON SERPL-MCNC: 9 UG/DL (ref 37–145)
LYMPHOCYTES NFR BLD: 1.38 K/UL
LYMPHOCYTES RELATIVE PERCENT: 19 % (ref 24–44)
MCH RBC QN AUTO: 21.1 PG (ref 27–31)
MCHC RBC AUTO-ENTMCNC: 28.8 G/DL (ref 32–36)
MCV RBC AUTO: 73.5 FL (ref 78–100)
MONOCYTES NFR BLD: 0.5 K/UL
MONOCYTES NFR BLD: 7 % (ref 0–4)
NEUTROPHILS NFR BLD: 61 % (ref 36–66)
NEUTS SEG NFR BLD: 4.46 K/UL
PATH REV BLD -IMP: NORMAL
PLATELET, FLUORESCENCE: 140 K/UL (ref 140–440)
POTASSIUM SERPL-SCNC: 5.1 MMOL/L (ref 3.5–5.1)
RBC # BLD AUTO: 2.79 M/UL (ref 4.2–5.4)
SODIUM SERPL-SCNC: 141 MMOL/L (ref 136–145)
TIBC SERPL-MCNC: 441 UG/DL (ref 260–445)
UNSATURATED IRON BINDING CAPACITY: 432 UG/DL (ref 110–370)
VIT B12 SERPL-MCNC: 304 PG/ML (ref 211–911)
WBC OTHER # BLD: 7.4 K/UL (ref 4–10.5)

## 2025-01-10 PROCEDURE — 2500000003 HC RX 250 WO HCPCS: Performed by: STUDENT IN AN ORGANIZED HEALTH CARE EDUCATION/TRAINING PROGRAM

## 2025-01-10 PROCEDURE — 6370000000 HC RX 637 (ALT 250 FOR IP): Performed by: STUDENT IN AN ORGANIZED HEALTH CARE EDUCATION/TRAINING PROGRAM

## 2025-01-10 PROCEDURE — 6370000000 HC RX 637 (ALT 250 FOR IP)

## 2025-01-10 PROCEDURE — 94761 N-INVAS EAR/PLS OXIMETRY MLT: CPT

## 2025-01-10 PROCEDURE — 6360000002 HC RX W HCPCS: Performed by: STUDENT IN AN ORGANIZED HEALTH CARE EDUCATION/TRAINING PROGRAM

## 2025-01-10 PROCEDURE — 2140000000 HC CCU INTERMEDIATE R&B

## 2025-01-10 PROCEDURE — 94640 AIRWAY INHALATION TREATMENT: CPT

## 2025-01-10 PROCEDURE — P9016 RBC LEUKOCYTES REDUCED: HCPCS

## 2025-01-10 PROCEDURE — 36430 TRANSFUSION BLD/BLD COMPNT: CPT

## 2025-01-10 PROCEDURE — 6360000002 HC RX W HCPCS: Performed by: PHYSICIAN ASSISTANT

## 2025-01-10 PROCEDURE — 85014 HEMATOCRIT: CPT

## 2025-01-10 PROCEDURE — 36415 COLL VENOUS BLD VENIPUNCTURE: CPT

## 2025-01-10 PROCEDURE — 2580000003 HC RX 258: Performed by: STUDENT IN AN ORGANIZED HEALTH CARE EDUCATION/TRAINING PROGRAM

## 2025-01-10 PROCEDURE — 94664 DEMO&/EVAL PT USE INHALER: CPT

## 2025-01-10 PROCEDURE — 93010 ELECTROCARDIOGRAM REPORT: CPT | Performed by: INTERNAL MEDICINE

## 2025-01-10 PROCEDURE — 85025 COMPLETE CBC W/AUTO DIFF WBC: CPT

## 2025-01-10 PROCEDURE — 80048 BASIC METABOLIC PNL TOTAL CA: CPT

## 2025-01-10 PROCEDURE — 85018 HEMOGLOBIN: CPT

## 2025-01-10 RX ORDER — POTASSIUM CHLORIDE 7.45 MG/ML
10 INJECTION INTRAVENOUS PRN
Status: ACTIVE | OUTPATIENT
Start: 2025-01-10 | End: 2025-01-10

## 2025-01-10 RX ORDER — SODIUM CHLORIDE 9 MG/ML
INJECTION, SOLUTION INTRAVENOUS PRN
Status: DISCONTINUED | OUTPATIENT
Start: 2025-01-10 | End: 2025-01-12 | Stop reason: HOSPADM

## 2025-01-10 RX ORDER — SODIUM CHLORIDE 0.9 % (FLUSH) 0.9 %
5-40 SYRINGE (ML) INJECTION PRN
Status: DISCONTINUED | OUTPATIENT
Start: 2025-01-10 | End: 2025-01-12 | Stop reason: HOSPADM

## 2025-01-10 RX ORDER — ONDANSETRON 4 MG/1
4 TABLET, ORALLY DISINTEGRATING ORAL EVERY 6 HOURS
Status: COMPLETED | OUTPATIENT
Start: 2025-01-10 | End: 2025-01-11

## 2025-01-10 RX ORDER — SODIUM CHLORIDE 0.9 % (FLUSH) 0.9 %
5-40 SYRINGE (ML) INJECTION EVERY 12 HOURS SCHEDULED
Status: DISCONTINUED | OUTPATIENT
Start: 2025-01-10 | End: 2025-01-12 | Stop reason: HOSPADM

## 2025-01-10 RX ORDER — ALPRAZOLAM 0.5 MG
0.5 TABLET ORAL 3 TIMES DAILY
Status: DISCONTINUED | OUTPATIENT
Start: 2025-01-10 | End: 2025-01-12 | Stop reason: HOSPADM

## 2025-01-10 RX ORDER — BACLOFEN 10 MG/1
10 TABLET ORAL 3 TIMES DAILY
Status: DISCONTINUED | OUTPATIENT
Start: 2025-01-10 | End: 2025-01-12 | Stop reason: HOSPADM

## 2025-01-10 RX ORDER — OXYCODONE HYDROCHLORIDE 10 MG/1
10 TABLET ORAL EVERY 6 HOURS
Status: DISCONTINUED | OUTPATIENT
Start: 2025-01-10 | End: 2025-01-12 | Stop reason: HOSPADM

## 2025-01-10 RX ORDER — ACETAMINOPHEN 325 MG/1
325 TABLET ORAL EVERY 6 HOURS
Status: DISCONTINUED | OUTPATIENT
Start: 2025-01-10 | End: 2025-01-12 | Stop reason: HOSPADM

## 2025-01-10 RX ORDER — MAGNESIUM SULFATE IN WATER 40 MG/ML
2000 INJECTION, SOLUTION INTRAVENOUS PRN
Status: DISCONTINUED | OUTPATIENT
Start: 2025-01-10 | End: 2025-01-12 | Stop reason: HOSPADM

## 2025-01-10 RX ORDER — DOCUSATE SODIUM 100 MG/1
100 CAPSULE, LIQUID FILLED ORAL 2 TIMES DAILY
Status: DISCONTINUED | OUTPATIENT
Start: 2025-01-10 | End: 2025-01-12 | Stop reason: HOSPADM

## 2025-01-10 RX ORDER — DULOXETIN HYDROCHLORIDE 30 MG/1
60 CAPSULE, DELAYED RELEASE ORAL DAILY
Status: DISCONTINUED | OUTPATIENT
Start: 2025-01-10 | End: 2025-01-12 | Stop reason: HOSPADM

## 2025-01-10 RX ORDER — BISACODYL 5 MG/1
10 TABLET, DELAYED RELEASE ORAL ONCE
Status: COMPLETED | OUTPATIENT
Start: 2025-01-10 | End: 2025-01-10

## 2025-01-10 RX ORDER — POTASSIUM CHLORIDE 1500 MG/1
40 TABLET, EXTENDED RELEASE ORAL PRN
Status: ACTIVE | OUTPATIENT
Start: 2025-01-10 | End: 2025-01-10

## 2025-01-10 RX ORDER — PANTOPRAZOLE SODIUM 40 MG/10ML
40 INJECTION, POWDER, LYOPHILIZED, FOR SOLUTION INTRAVENOUS 2 TIMES DAILY
Status: DISCONTINUED | OUTPATIENT
Start: 2025-01-10 | End: 2025-01-10

## 2025-01-10 RX ORDER — POLYETHYLENE GLYCOL 3350 17 G/17G
17 POWDER, FOR SOLUTION ORAL DAILY PRN
Status: DISCONTINUED | OUTPATIENT
Start: 2025-01-10 | End: 2025-01-12 | Stop reason: HOSPADM

## 2025-01-10 RX ORDER — ONDANSETRON 4 MG/1
4 TABLET, ORALLY DISINTEGRATING ORAL EVERY 8 HOURS PRN
Status: DISCONTINUED | OUTPATIENT
Start: 2025-01-10 | End: 2025-01-12 | Stop reason: HOSPADM

## 2025-01-10 RX ORDER — FOLIC ACID 1 MG/1
1 TABLET ORAL DAILY
Status: DISCONTINUED | OUTPATIENT
Start: 2025-01-10 | End: 2025-01-12 | Stop reason: HOSPADM

## 2025-01-10 RX ORDER — CARBIDOPA/LEVODOPA 25MG-250MG
1 TABLET ORAL 2 TIMES DAILY
Status: DISCONTINUED | OUTPATIENT
Start: 2025-01-10 | End: 2025-01-12 | Stop reason: HOSPADM

## 2025-01-10 RX ORDER — TAMSULOSIN HYDROCHLORIDE 0.4 MG/1
0.4 CAPSULE ORAL DAILY
Status: DISCONTINUED | OUTPATIENT
Start: 2025-01-10 | End: 2025-01-12 | Stop reason: HOSPADM

## 2025-01-10 RX ORDER — OXYCODONE AND ACETAMINOPHEN 10; 325 MG/1; MG/1
1 TABLET ORAL 4 TIMES DAILY
Status: DISCONTINUED | OUTPATIENT
Start: 2025-01-10 | End: 2025-01-10 | Stop reason: CLARIF

## 2025-01-10 RX ORDER — ACETAMINOPHEN 325 MG/1
650 TABLET ORAL EVERY 6 HOURS PRN
Status: DISCONTINUED | OUTPATIENT
Start: 2025-01-10 | End: 2025-01-12 | Stop reason: HOSPADM

## 2025-01-10 RX ORDER — TRAZODONE HYDROCHLORIDE 50 MG/1
25 TABLET, FILM COATED ORAL NIGHTLY
Status: DISCONTINUED | OUTPATIENT
Start: 2025-01-10 | End: 2025-01-12 | Stop reason: HOSPADM

## 2025-01-10 RX ORDER — ONDANSETRON 2 MG/ML
4 INJECTION INTRAMUSCULAR; INTRAVENOUS EVERY 6 HOURS PRN
Status: DISCONTINUED | OUTPATIENT
Start: 2025-01-10 | End: 2025-01-12 | Stop reason: HOSPADM

## 2025-01-10 RX ORDER — AMLODIPINE BESYLATE 10 MG/1
10 TABLET ORAL DAILY
Status: DISCONTINUED | OUTPATIENT
Start: 2025-01-10 | End: 2025-01-12 | Stop reason: HOSPADM

## 2025-01-10 RX ORDER — ACETAMINOPHEN 650 MG/1
650 SUPPOSITORY RECTAL EVERY 6 HOURS PRN
Status: DISCONTINUED | OUTPATIENT
Start: 2025-01-10 | End: 2025-01-12 | Stop reason: HOSPADM

## 2025-01-10 RX ORDER — FUROSEMIDE 40 MG/1
40 TABLET ORAL DAILY
Status: DISCONTINUED | OUTPATIENT
Start: 2025-01-10 | End: 2025-01-12 | Stop reason: HOSPADM

## 2025-01-10 RX ORDER — SIMETHICONE 80 MG
80 TABLET,CHEWABLE ORAL EVERY 6 HOURS
Status: COMPLETED | OUTPATIENT
Start: 2025-01-10 | End: 2025-01-11

## 2025-01-10 RX ADMIN — OXYCODONE HYDROCHLORIDE 10 MG: 10 TABLET ORAL at 06:12

## 2025-01-10 RX ADMIN — OXYCODONE HYDROCHLORIDE 10 MG: 10 TABLET ORAL at 18:11

## 2025-01-10 RX ADMIN — BACLOFEN 10 MG: 10 TABLET ORAL at 08:46

## 2025-01-10 RX ADMIN — OXYCODONE HYDROCHLORIDE 10 MG: 10 TABLET ORAL at 12:50

## 2025-01-10 RX ADMIN — ALPRAZOLAM 0.5 MG: 0.5 TABLET ORAL at 22:02

## 2025-01-10 RX ADMIN — AMLODIPINE BESYLATE 10 MG: 10 TABLET ORAL at 08:46

## 2025-01-10 RX ADMIN — ACETAMINOPHEN 325 MG: 325 TABLET ORAL at 12:50

## 2025-01-10 RX ADMIN — SODIUM CHLORIDE, PRESERVATIVE FREE 10 ML: 5 INJECTION INTRAVENOUS at 22:20

## 2025-01-10 RX ADMIN — CARBIDOPA AND LEVODOPA 1 TABLET: 25; 250 TABLET ORAL at 08:49

## 2025-01-10 RX ADMIN — PANTOPRAZOLE SODIUM 40 MG: 40 INJECTION, POWDER, FOR SOLUTION INTRAVENOUS at 22:02

## 2025-01-10 RX ADMIN — DULOXETINE HYDROCHLORIDE 60 MG: 30 CAPSULE, DELAYED RELEASE ORAL at 08:47

## 2025-01-10 RX ADMIN — CARBIDOPA AND LEVODOPA 1 TABLET: 25; 250 TABLET ORAL at 22:21

## 2025-01-10 RX ADMIN — ALPRAZOLAM 0.5 MG: 0.5 TABLET ORAL at 15:15

## 2025-01-10 RX ADMIN — PANTOPRAZOLE SODIUM 40 MG: 40 INJECTION, POWDER, FOR SOLUTION INTRAVENOUS at 11:48

## 2025-01-10 RX ADMIN — TRAZODONE HYDROCHLORIDE 25 MG: 50 TABLET ORAL at 22:02

## 2025-01-10 RX ADMIN — ACETAMINOPHEN 325 MG: 325 TABLET ORAL at 18:11

## 2025-01-10 RX ADMIN — PANTOPRAZOLE SODIUM 40 MG: 40 INJECTION, POWDER, FOR SOLUTION INTRAVENOUS at 03:45

## 2025-01-10 RX ADMIN — BISACODYL 10 MG: 5 TABLET, COATED ORAL at 12:50

## 2025-01-10 RX ADMIN — SIMETHICONE 80 MG: 80 TABLET, CHEWABLE ORAL at 15:15

## 2025-01-10 RX ADMIN — POLYETHYLENE GLYCOL 3350, SODIUM SULFATE ANHYDROUS, SODIUM BICARBONATE, SODIUM CHLORIDE, POTASSIUM CHLORIDE 4000 ML: 236; 22.74; 6.74; 5.86; 2.97 POWDER, FOR SOLUTION ORAL at 15:26

## 2025-01-10 RX ADMIN — SODIUM CHLORIDE, PRESERVATIVE FREE 10 ML: 5 INJECTION INTRAVENOUS at 11:49

## 2025-01-10 RX ADMIN — ONDANSETRON 4 MG: 4 TABLET, ORALLY DISINTEGRATING ORAL at 22:02

## 2025-01-10 RX ADMIN — FOLIC ACID 1 MG: 1 TABLET ORAL at 08:46

## 2025-01-10 RX ADMIN — FUROSEMIDE 40 MG: 40 TABLET ORAL at 08:46

## 2025-01-10 RX ADMIN — ACETAMINOPHEN 325 MG: 325 TABLET ORAL at 06:12

## 2025-01-10 RX ADMIN — IRON SUCROSE 200 MG: 20 INJECTION, SOLUTION INTRAVENOUS at 06:16

## 2025-01-10 RX ADMIN — SERTRALINE HYDROCHLORIDE 100 MG: 50 TABLET ORAL at 08:46

## 2025-01-10 RX ADMIN — ALPRAZOLAM 0.5 MG: 0.5 TABLET ORAL at 08:46

## 2025-01-10 RX ADMIN — TAMSULOSIN HYDROCHLORIDE 0.4 MG: 0.4 CAPSULE ORAL at 08:46

## 2025-01-10 RX ADMIN — BACLOFEN 10 MG: 10 TABLET ORAL at 15:15

## 2025-01-10 RX ADMIN — DOCUSATE SODIUM 100 MG: 100 CAPSULE, LIQUID FILLED ORAL at 08:46

## 2025-01-10 RX ADMIN — ONDANSETRON 4 MG: 4 TABLET, ORALLY DISINTEGRATING ORAL at 15:14

## 2025-01-10 RX ADMIN — ALBUTEROL SULFATE 2.5 MG: 2.5 SOLUTION RESPIRATORY (INHALATION) at 00:17

## 2025-01-10 RX ADMIN — BACLOFEN 10 MG: 10 TABLET ORAL at 22:02

## 2025-01-10 RX ADMIN — SIMETHICONE 80 MG: 80 TABLET, CHEWABLE ORAL at 22:02

## 2025-01-10 ASSESSMENT — PAIN SCALES - GENERAL
PAINLEVEL_OUTOF10: 8
PAINLEVEL_OUTOF10: 0

## 2025-01-10 NOTE — CONSENT
Informed Consent for Blood Component Transfusion Note    I have discussed with the patient the rationale for blood component transfusion; its benefits in treating or preventing fatigue, organ damage, or death; and its risk which includes mild transfusion reactions, rare risk of blood borne infection, or more serious but rare reactions. I have discussed the alternatives to transfusion, including the risk and consequences of not receiving transfusion. The patient had an opportunity to ask questions and had agreed to proceed with transfusion of blood components.    Electronically signed by Xiomy Hameed PA-C on 1/9/25 at 7:14 PM EST

## 2025-01-10 NOTE — PROGRESS NOTES
V2.0    [facility] [place]      Name:  Nerissa Brewster /Age/Sex: 1950  (74 y.o. female)   MRN & CSN:  [id number] & [id number] Encounter Date/Time: 1/10/2025 10:15 AM EST   [facility] PCP: Andres Henry MD     Attending:Bill Gray MD       Hospital Day: 2    Assessment and Recommendations   Nerissa Brewster is a 74 y.o. female who presents with Acute on chronic anemia      Plan:       Acute microcytic anemia  -Secondary to iron deficiency as evidenced on blood work  - 2 units pRBC ordered recheck transfuse if HgB < 7 g/dl  -Started on Protonix twice daily.  -Check CBCs every 12 hours and transfuse to maintain hemoglobin more than 7.  -GI consulted.  Plans noted for EGD and colonoscopy tomorrow.  Clear liquid diet for today.     Acute kidney injury with hyperkalemia:   -Hold spironolactone.  Is on meloxicam as well.  Was given IV fluids.  Creatinine improved somewhat.  Will continue gentle hydration in the setting of colonoscopy prep and recheck labs tomorrow morning.     H/o TAVR in 2023     Coronary artery disease s/p remote PCI to mid LAD, RCA and OM 2  Paroxysmal atrial fibrillation-anticoagulated with Eliquis hold in thesetting of suspected GI bleed due to anemia  Hyperlipidemia: Continue home meds  Parkinson's disease  Chronic thoracic/lumbar pain       Diet ADULT DIET; Clear Liquid  Diet NPO   DVT Prophylaxis [] Lovenox, []  Heparin, [x] SCDs, [] Ambulation,  [] Eliquis, [] Xarelto  [] Coumadin   Code Status DNR-CCA   Disposition From: Home  Expected Disposition: Home  Estimated Date of Discharge: 3 days patient requires continued admission due to acute on chronic anemia, GI bleed   Surrogate Decision Maker/ Chava Rowe (Child)        Personally reviewed Lab Studies and Imaging           Subjective:     Chief Complaint:   Chief Complaint   Patient presents with    Abnormal Lab     Low hgb 4.7 from [place]. Patient states she has felt weak for a month and a half

## 2025-01-10 NOTE — ED NOTES
ED TO INPATIENT SBAR HANDOFF    Patient Name: Nerissa Brewster   :  1950  74 y.o.   Preferred Name  Audrey  Family/Caregiver Present no   Restraints no   C-SSRS: Risk of Suicide: No Risk  Sitter no   Sepsis Risk Score        Situation  Chief Complaint   Patient presents with    Abnormal Lab     Low hgb 4.7 from [place]. Patient states she has felt weak for a month and a half      Brief Description of Patient's Condition: Pt came in from [facility] by EMS for low Hgb and weakness for over a month.   Mental Status: oriented  Arrived from: nursing home    Imaging:   No orders to display     Abnormal labs:   Abnormal Labs Reviewed   CBC WITH AUTO DIFFERENTIAL - Abnormal; Notable for the following components:       Result Value    RBC 2.48 (*)     Hemoglobin 4.7 (*)     Hematocrit 16.9 (*)     MCV 68.1 (*)     MCH 19.0 (*)     MCHC 27.8 (*)     RDW 19.3 (*)     Lymphocytes % 20 (*)     Monocytes % 8 (*)     Eosinophils % 12 (*)     All other components within normal limits   COMPREHENSIVE METABOLIC PANEL - Abnormal; Notable for the following components:    Potassium 5.8 (*)     BUN 49 (*)     Creatinine 1.8 (*)     Est, Glom Filt Rate 27 (*)     Total Protein 6.2 (*)     ALT <5 (*)     All other components within normal limits        Background  History:   Past Medical History:   Diagnosis Date    A-fib ([facility])     ACS (acute coronary syndrome) ([facility]) 2014    TRINO (acute kidney injury) ([facility]) 2017    Anxiety disorder     Arthritis     Atrial fibrillation ([facility])     Blood transfusion     CAD (coronary artery disease)     \"have 6 heart stents- follows with Dr Ramana Leon    Cerebral artery occlusion with cerebral infarction ([facility])     CHF (congestive heart failure) ([facility])     pt. denies this dx    Coronary syndrome, acute ([facility])     lesion of lad    Depression     Diabetes mellitus ([facility])     \"dx 2 yr ago\"    Dysphagia     pt is unsure about this dx with phone assessment 3/6/2017    Fall     \"fell first week of

## 2025-01-10 NOTE — CARE COORDINATION
CM reviewed pt's medical record and discussed in IDR. CM introduced self and the role of case management. CM in to see pt to discuss discharge plans. Pt is from [facility] and plans to return when medically ready. CM called [facility] and verified that pt is able to return on discharge.       01/10/25 3136   Service Assessment   Patient Orientation Alert and Oriented   Cognition Alert   History Provided By Patient;Medical Record   Primary Caregiver Other (Comment)  ([facility])   Support Systems Children   Patient's Healthcare Decision Maker is: Legal Next of Kin   PCP Verified by CM Yes   Prior Functional Level Assistance with the following:;Bathing;Other (see comment)  (uses walker to ambulate in her room and uses wheelchair for longer distance mobility)   Can patient return to prior living arrangement Yes   Ability to make needs known: Good   Family able to assist with home care needs: Other (comment)  ([facility] staff)   Would you like for me to discuss the discharge plan with any other family members/significant others, and if so, who? Yes  (family if needed)   Financial Resources Medicare;Medicaid   Community Resources Assisted Living

## 2025-01-10 NOTE — H&P
V2.0  History and Physical      Name:  Nerissa Brewster /Age/Sex: 1950  (74 y.o. female)   MRN & CSN:  [id number] & [id number] Encounter Date/Time: 2025 11:47 PM EST   Location[facility]  [facility] PCP: Andres Henry MD       Hospital Day: 1    Assessment and Plan:     Patient is a 74 y.o. female who presented with fatigue and generalized weakness    Acute microcytic anemia  -Secondary to iron deficiency as evidenced on blood work  -No clear evidence so far of GI bleed, check FOBT, hold eliquis for now  -Start Protonix IV twice daily empirically  -Consult GI if FOBT positive  -Start Venofer  -Check peripheral smear  -Check B12 and folic acid    Acute kidney injury with hyperkalemia: was recently started on spironolactone per history which may be contributing  hold spirobnolactone recheck BMP in AM, was given IV fluid in the ED, hold anymore IV fluids due to risk of volume overload    H/o TAVR in 2023    Coronary artery disease s/p remote PCI to mid LAD, RCA and OM 2  Paroxysmal atrial fibrillation-anticoagulated with Eliquis hold in thesetting of suspected GI bleed due to anemia  Hyperlipidemia: Continue home meds  Parkinson's disease  Chronic thoracic/lumbar pain         Checklist:  Advanced directive: full  Diet: cardiac  DVT ppx: SCD  Sugar: BG goal of 140-180 while inpatient    Disposition: admit to inpatient.  Estimated discharge: 3 day(s).  Current living situation: [facility]  Expected disposition: same    Spoke with ED provider who recommended admission for the patient and I agree with that plan.  Personally reviewed lab studies and imaging.  EKG interpreted personally and results as stated above.  Imaging that was interpreted personally and results as stated above.      Comment: Please note this report has been produced using speech recognition software and may contain errors related to that system including errors in grammar, punctuation, and spelling, as well as words and phrases that may be

## 2025-01-10 NOTE — PLAN OF CARE
Problem: Chronic Conditions and Co-morbidities  Goal: Patient's chronic conditions and co-morbidity symptoms are monitored and maintained or improved  1/10/2025 1638 by Ashli Miguel RN  Outcome: Progressing  1/10/2025 1201 by Rimma Mckeon RN  Outcome: Progressing  1/10/2025 0530 by Sagrario Huang RN  Outcome: Progressing  1/10/2025 0529 by Sagrario Huang RN  Outcome: Progressing     Problem: Discharge Planning  Goal: Discharge to home or other facility with appropriate resources  1/10/2025 1638 by Ashli Miguel RN  Outcome: Progressing  1/10/2025 1201 by Rimma Mckeon RN  Outcome: Progressing  1/10/2025 0530 by Sagrario Huang RN  Outcome: Progressing  1/10/2025 0529 by Sagrario Huang RN  Outcome: Progressing  Flowsheets (Taken 1/10/2025 0452)  Discharge to home or other facility with appropriate resources: Identify barriers to discharge with patient and caregiver     Problem: Safety - Adult  Goal: Free from fall injury  1/10/2025 1638 by Ashli Miguel RN  Outcome: Progressing  1/10/2025 1201 by Rimma Mckeon RN  Outcome: Progressing  1/10/2025 0530 by Sagrario Huang RN  Outcome: Progressing  1/10/2025 0529 by Sagrario Huang RN  Outcome: Progressing     Problem: ABCDS Injury Assessment  Goal: Absence of physical injury  1/10/2025 1638 by Ashli Miguel RN  Outcome: Progressing  1/10/2025 1201 by Rimma Mckeon RN  Outcome: Progressing  1/10/2025 0530 by Sagrario Huang RN  Outcome: Progressing  1/10/2025 0529 by Sagrario Huang RN  Outcome: Progressing  Flowsheets (Taken 1/10/2025 0420)  Absence of Physical Injury: Implement safety measures based on patient assessment     Problem: Skin/Tissue Integrity  Goal: Absence of new skin breakdown  Description: 1.  Monitor for areas of redness and/or skin breakdown  2.  Assess vascular access sites hourly  3.  Every 4-6 hours minimum:  Change oxygen saturation probe site  4.  Every 4-6 hours:  If on nasal continuous positive airway pressure,

## 2025-01-10 NOTE — PROGRESS NOTES
4 Eyes Skin Assessment     NAME:  Nerissa Brewster  YOB: 1950  MEDICAL RECORD NUMBER:  [id number]    The patient is being assessed for  Admission    I agree that at least one RN has performed a thorough Head to Toe Skin Assessment on the patient. ALL assessment sites listed below have been assessed.      Areas assessed by both nurses:    Entire body        Does the Patient have a Wound? No noted wound(s)       Andriy Prevention initiated by RN: Yes  Wound Care Orders initiated by RN: No    Pressure Injury (Stage 3,4, Unstageable, DTI, NWPT, and Complex wounds) if present, place Wound referral order by RN under : No    New Ostomies, if present place, Ostomy referral order under : No     Nurse 1 eSignature: Electronically signed by Sagrario Huang RN on 1/10/25 at 3:10 AM EST    **SHARE this note so that the co-signing nurse can place an eSignature**    Nurse 2 eSignature: Electronically signed by Sonya Jaimes RN on 1/10/25 at 3:11 AM EST

## 2025-01-10 NOTE — PROGRESS NOTES
Patient arrived to unit at 0300 per stretcher from ED, reviewed SBAR, admission complete.  Writer called son Chava and advised him patient at this hospital and to be admitted, writer offered patient to speak to son, patient accepted.  Sagrario (on call) notified of H/H results; new orders received.  Await blood to arrive.  Patient aware.

## 2025-01-10 NOTE — CONSULTS
Acute on chronic anemia  Admitted after outpatient labs showed hemoglobin of 4.7 patient denies overt GI bleed denies abdominal pain  But does feel generally tired fatigued  Denies GI eval in the recent past  Abdomen soft  Transfuse to keep hemoglobin more than 7 keep PPI twice daily for gastric mucosal protection recommend EGD colonoscopy to evaluate further patient agrees    I saw and evaluated the patient myself.  I personally reviewed the chart, labs, and imaging studies and provided a substantive portion of the care for this patient.  I personally performed all aspects of medical decision making for this encounter. I have spoken with the patient, nursing staff and provided them with appropriate verbal and written instructions.      I have reviewed and verified this documentation done by Renetta Costello and it accurately reflects our care and I have added a separate note to reflect my clinical impression and suggestions.    Abbie [facility]      Please note that time of note may not reflect time of encounter    Please note this report has been partially produced using speech recognition software and may contain errors related to that system including errors in grammar, punctuation, and spelling, as well as words and phrases that may be inappropriate. If there are any questions or concerns please feel free to contact the dictating provider for clarification.      [facility]    [facility]  Gastroenterology Consultation    1/10/2025  8:35 AM  ________________________________________________________________________  Patient:    Nerissa Brewster : 1950   74 y.o.         MRN: [id number]  Admitted: 2025  5:15 PM ATT: Bill Gray MD  3120/3120-A  AdmitDx: Hyperkalemia [E87.5]  TRINO (acute kidney injury) (HCC) [N17.9]  Anemia, unspecified type [D64.9]  Acute on chronic anemia [D64.9] PCP: Andres Henry

## 2025-01-10 NOTE — PLAN OF CARE
Problem: Chronic Conditions and Co-morbidities  Goal: Patient's chronic conditions and co-morbidity symptoms are monitored and maintained or improved  1/10/2025 1201 by Rimma Mckeon RN  Outcome: Progressing  1/10/2025 0530 by Sagrario Huang RN  Outcome: Progressing  1/10/2025 0529 by Sagrario Huang RN  Outcome: Progressing     Problem: Discharge Planning  Goal: Discharge to home or other facility with appropriate resources  1/10/2025 1201 by Rimma Mckeon RN  Outcome: Progressing  1/10/2025 0530 by Sagrario Huang RN  Outcome: Progressing  1/10/2025 0529 by Sagrario Huang RN  Outcome: Progressing  Flowsheets (Taken 1/10/2025 0452)  Discharge to home or other facility with appropriate resources: Identify barriers to discharge with patient and caregiver     Problem: Safety - Adult  Goal: Free from fall injury  1/10/2025 1201 by Rimma Mckeon RN  Outcome: Progressing  1/10/2025 0530 by Sagrario Huang RN  Outcome: Progressing  1/10/2025 0529 by Sagrario Huang RN  Outcome: Progressing     Problem: ABCDS Injury Assessment  Goal: Absence of physical injury  1/10/2025 1201 by Rimma Mckeon RN  Outcome: Progressing  1/10/2025 0530 by Sagrario Huang RN  Outcome: Progressing  1/10/2025 0529 by Sagrario Huang RN  Outcome: Progressing  Flowsheets (Taken 1/10/2025 0420)  Absence of Physical Injury: Implement safety measures based on patient assessment

## 2025-01-11 ENCOUNTER — ANESTHESIA EVENT (OUTPATIENT)
Dept: ENDOSCOPY | Age: 75
End: 2025-01-11
Payer: COMMERCIAL

## 2025-01-11 ENCOUNTER — APPOINTMENT (OUTPATIENT)
Dept: GENERAL RADIOLOGY | Age: 75
DRG: 812 | End: 2025-01-11
Payer: COMMERCIAL

## 2025-01-11 ENCOUNTER — ANESTHESIA (OUTPATIENT)
Dept: ENDOSCOPY | Age: 75
End: 2025-01-11
Payer: COMMERCIAL

## 2025-01-11 LAB
ABO/RH: NORMAL
ANION GAP SERPL CALCULATED.3IONS-SCNC: 12 MMOL/L (ref 9–17)
ANTIBODY SCREEN: NEGATIVE
BASOPHILS # BLD: 0.13 K/UL
BASOPHILS # BLD: 0.14 K/UL
BASOPHILS NFR BLD: 1 % (ref 0–1)
BASOPHILS NFR BLD: 1 % (ref 0–1)
BLOOD BANK BLOOD PRODUCT EXPIRATION DATE: NORMAL
BLOOD BANK BLOOD PRODUCT EXPIRATION DATE: NORMAL
BLOOD BANK DISPENSE STATUS: NORMAL
BLOOD BANK DISPENSE STATUS: NORMAL
BLOOD BANK ISBT PRODUCT BLOOD TYPE: 9500
BLOOD BANK ISBT PRODUCT BLOOD TYPE: 9500
BLOOD BANK PRODUCT CODE: NORMAL
BLOOD BANK PRODUCT CODE: NORMAL
BLOOD BANK SAMPLE EXPIRATION: NORMAL
BLOOD BANK UNIT TYPE AND RH: NORMAL
BLOOD BANK UNIT TYPE AND RH: NORMAL
BPU ID: NORMAL
BPU ID: NORMAL
BUN SERPL-MCNC: 24 MG/DL (ref 7–20)
CALCIUM SERPL-MCNC: 9 MG/DL (ref 8.3–10.6)
CHLORIDE SERPL-SCNC: 110 MMOL/L (ref 99–110)
CO2 SERPL-SCNC: 21 MMOL/L (ref 21–32)
COMPONENT: NORMAL
COMPONENT: NORMAL
CREAT SERPL-MCNC: 1 MG/DL (ref 0.6–1.2)
CROSSMATCH RESULT: NORMAL
CROSSMATCH RESULT: NORMAL
EOSINOPHIL # BLD: 1.09 K/UL
EOSINOPHIL # BLD: 1.28 K/UL
EOSINOPHILS RELATIVE PERCENT: 12 % (ref 0–3)
EOSINOPHILS RELATIVE PERCENT: 13 % (ref 0–3)
ERYTHROCYTE [DISTWIDTH] IN BLOOD BY AUTOMATED COUNT: 23.4 % (ref 11.7–14.9)
ERYTHROCYTE [DISTWIDTH] IN BLOOD BY AUTOMATED COUNT: 23.6 % (ref 11.7–14.9)
GFR, ESTIMATED: 54 ML/MIN/1.73M2
GLUCOSE BLD-MCNC: 89 MG/DL (ref 74–99)
GLUCOSE SERPL-MCNC: 90 MG/DL (ref 74–99)
HCT VFR BLD AUTO: 25.6 % (ref 37–47)
HCT VFR BLD AUTO: 26.3 % (ref 37–47)
HGB BLD-MCNC: 7.4 G/DL (ref 12.5–16)
HGB BLD-MCNC: 7.7 G/DL (ref 12.5–16)
IMM GRANULOCYTES # BLD AUTO: 0.06 K/UL
IMM GRANULOCYTES # BLD AUTO: 0.1 K/UL
IMM GRANULOCYTES NFR BLD: 1 %
IMM GRANULOCYTES NFR BLD: 1 %
LYMPHOCYTES NFR BLD: 1 K/UL
LYMPHOCYTES NFR BLD: 1.62 K/UL
LYMPHOCYTES RELATIVE PERCENT: 11 % (ref 24–44)
LYMPHOCYTES RELATIVE PERCENT: 16 % (ref 24–44)
MCH RBC QN AUTO: 22 PG (ref 27–31)
MCH RBC QN AUTO: 22.1 PG (ref 27–31)
MCHC RBC AUTO-ENTMCNC: 28.9 G/DL (ref 32–36)
MCHC RBC AUTO-ENTMCNC: 29.3 G/DL (ref 32–36)
MCV RBC AUTO: 75.4 FL (ref 78–100)
MCV RBC AUTO: 76 FL (ref 78–100)
MONOCYTES NFR BLD: 0.57 K/UL
MONOCYTES NFR BLD: 0.87 K/UL
MONOCYTES NFR BLD: 6 % (ref 0–4)
MONOCYTES NFR BLD: 9 % (ref 0–4)
NEUTROPHILS NFR BLD: 61 % (ref 36–66)
NEUTROPHILS NFR BLD: 68 % (ref 36–66)
NEUTS SEG NFR BLD: 6.17 K/UL
NEUTS SEG NFR BLD: 6.18 K/UL
PLATELET, FLUORESCENCE: 153 K/UL (ref 140–440)
PLATELET, FLUORESCENCE: 156 K/UL (ref 140–440)
POTASSIUM SERPL-SCNC: 4.5 MMOL/L (ref 3.5–5.1)
RBC # BLD AUTO: 3.37 M/UL (ref 4.2–5.4)
RBC # BLD AUTO: 3.49 M/UL (ref 4.2–5.4)
SODIUM SERPL-SCNC: 142 MMOL/L (ref 136–145)
TRANSFUSION STATUS: NORMAL
TRANSFUSION STATUS: NORMAL
UNIT DIVISION: 0
UNIT DIVISION: 0
UNIT ISSUE DATE/TIME: NORMAL
UNIT ISSUE DATE/TIME: NORMAL
WBC OTHER # BLD: 10.2 K/UL (ref 4–10.5)
WBC OTHER # BLD: 9.1 K/UL (ref 4–10.5)

## 2025-01-11 PROCEDURE — 0DBK8ZX EXCISION OF ASCENDING COLON, VIA NATURAL OR ARTIFICIAL OPENING ENDOSCOPIC, DIAGNOSTIC: ICD-10-PCS | Performed by: INTERNAL MEDICINE

## 2025-01-11 PROCEDURE — 2709999900 HC NON-CHARGEABLE SUPPLY: Performed by: INTERNAL MEDICINE

## 2025-01-11 PROCEDURE — 6370000000 HC RX 637 (ALT 250 FOR IP): Performed by: STUDENT IN AN ORGANIZED HEALTH CARE EDUCATION/TRAINING PROGRAM

## 2025-01-11 PROCEDURE — 2700000000 HC OXYGEN THERAPY PER DAY

## 2025-01-11 PROCEDURE — 85025 COMPLETE CBC W/AUTO DIFF WBC: CPT

## 2025-01-11 PROCEDURE — 88305 TISSUE EXAM BY PATHOLOGIST: CPT | Performed by: PATHOLOGY

## 2025-01-11 PROCEDURE — 2580000003 HC RX 258: Performed by: INTERNAL MEDICINE

## 2025-01-11 PROCEDURE — 2580000003 HC RX 258

## 2025-01-11 PROCEDURE — 3609012400 HC EGD TRANSORAL BIOPSY SINGLE/MULTIPLE: Performed by: INTERNAL MEDICINE

## 2025-01-11 PROCEDURE — 3700000000 HC ANESTHESIA ATTENDED CARE: Performed by: INTERNAL MEDICINE

## 2025-01-11 PROCEDURE — 6370000000 HC RX 637 (ALT 250 FOR IP): Performed by: INTERNAL MEDICINE

## 2025-01-11 PROCEDURE — 2500000003 HC RX 250 WO HCPCS: Performed by: INTERNAL MEDICINE

## 2025-01-11 PROCEDURE — 88112 CYTOPATH CELL ENHANCE TECH: CPT | Performed by: PATHOLOGY

## 2025-01-11 PROCEDURE — 6360000002 HC RX W HCPCS: Performed by: INTERNAL MEDICINE

## 2025-01-11 PROCEDURE — 88342 IMHCHEM/IMCYTCHM 1ST ANTB: CPT | Performed by: PATHOLOGY

## 2025-01-11 PROCEDURE — 2140000000 HC CCU INTERMEDIATE R&B

## 2025-01-11 PROCEDURE — 6360000002 HC RX W HCPCS: Performed by: STUDENT IN AN ORGANIZED HEALTH CARE EDUCATION/TRAINING PROGRAM

## 2025-01-11 PROCEDURE — 0DB68ZX EXCISION OF STOMACH, VIA NATURAL OR ARTIFICIAL OPENING ENDOSCOPIC, DIAGNOSTIC: ICD-10-PCS | Performed by: INTERNAL MEDICINE

## 2025-01-11 PROCEDURE — 82962 GLUCOSE BLOOD TEST: CPT

## 2025-01-11 PROCEDURE — 6370000000 HC RX 637 (ALT 250 FOR IP)

## 2025-01-11 PROCEDURE — 80048 BASIC METABOLIC PNL TOTAL CA: CPT

## 2025-01-11 PROCEDURE — 71045 X-RAY EXAM CHEST 1 VIEW: CPT

## 2025-01-11 PROCEDURE — 36415 COLL VENOUS BLD VENIPUNCTURE: CPT

## 2025-01-11 PROCEDURE — 6360000002 HC RX W HCPCS

## 2025-01-11 PROCEDURE — 3609027000 HC COLONOSCOPY: Performed by: INTERNAL MEDICINE

## 2025-01-11 PROCEDURE — 2580000003 HC RX 258: Performed by: STUDENT IN AN ORGANIZED HEALTH CARE EDUCATION/TRAINING PROGRAM

## 2025-01-11 PROCEDURE — 94761 N-INVAS EAR/PLS OXIMETRY MLT: CPT

## 2025-01-11 PROCEDURE — 0DBM8ZX EXCISION OF DESCENDING COLON, VIA NATURAL OR ARTIFICIAL OPENING ENDOSCOPIC, DIAGNOSTIC: ICD-10-PCS | Performed by: INTERNAL MEDICINE

## 2025-01-11 PROCEDURE — 3700000001 HC ADD 15 MINUTES (ANESTHESIA): Performed by: INTERNAL MEDICINE

## 2025-01-11 RX ORDER — PROPOFOL 10 MG/ML
INJECTION, EMULSION INTRAVENOUS
Status: DISCONTINUED | OUTPATIENT
Start: 2025-01-11 | End: 2025-01-11 | Stop reason: SDUPTHER

## 2025-01-11 RX ORDER — FLUCONAZOLE 100 MG/1
200 TABLET ORAL DAILY
Status: DISCONTINUED | OUTPATIENT
Start: 2025-01-11 | End: 2025-01-12 | Stop reason: HOSPADM

## 2025-01-11 RX ORDER — LIDOCAINE HYDROCHLORIDE 40 MG/ML
SOLUTION TOPICAL
Status: DISCONTINUED | OUTPATIENT
Start: 2025-01-11 | End: 2025-01-11 | Stop reason: SDUPTHER

## 2025-01-11 RX ORDER — LIDOCAINE HYDROCHLORIDE 20 MG/ML
INJECTION, SOLUTION INFILTRATION; PERINEURAL
Status: DISCONTINUED | OUTPATIENT
Start: 2025-01-11 | End: 2025-01-11 | Stop reason: SDUPTHER

## 2025-01-11 RX ADMIN — CARBIDOPA AND LEVODOPA 1 TABLET: 25; 250 TABLET ORAL at 21:14

## 2025-01-11 RX ADMIN — ONDANSETRON 4 MG: 4 TABLET, ORALLY DISINTEGRATING ORAL at 05:08

## 2025-01-11 RX ADMIN — ALPRAZOLAM 0.5 MG: 0.5 TABLET ORAL at 15:30

## 2025-01-11 RX ADMIN — OXYCODONE HYDROCHLORIDE 10 MG: 10 TABLET ORAL at 00:26

## 2025-01-11 RX ADMIN — LIDOCAINE HYDROCHLORIDE 100 MG: 20 INJECTION, SOLUTION INFILTRATION; PERINEURAL at 09:19

## 2025-01-11 RX ADMIN — BACLOFEN 10 MG: 10 TABLET ORAL at 21:14

## 2025-01-11 RX ADMIN — BACLOFEN 10 MG: 10 TABLET ORAL at 15:30

## 2025-01-11 RX ADMIN — DOCUSATE SODIUM 100 MG: 100 CAPSULE, LIQUID FILLED ORAL at 21:14

## 2025-01-11 RX ADMIN — SODIUM CHLORIDE, PRESERVATIVE FREE 10 ML: 5 INJECTION INTRAVENOUS at 21:15

## 2025-01-11 RX ADMIN — PROPOFOL 250 MG: 10 INJECTION, EMULSION INTRAVENOUS at 09:19

## 2025-01-11 RX ADMIN — ONDANSETRON 4 MG: 4 TABLET, ORALLY DISINTEGRATING ORAL at 15:30

## 2025-01-11 RX ADMIN — IRON SUCROSE 200 MG: 20 INJECTION, SOLUTION INTRAVENOUS at 05:16

## 2025-01-11 RX ADMIN — FUROSEMIDE 40 MG: 40 TABLET ORAL at 10:58

## 2025-01-11 RX ADMIN — SERTRALINE HYDROCHLORIDE 100 MG: 50 TABLET ORAL at 10:58

## 2025-01-11 RX ADMIN — AMLODIPINE BESYLATE 10 MG: 10 TABLET ORAL at 10:58

## 2025-01-11 RX ADMIN — TAMSULOSIN HYDROCHLORIDE 0.4 MG: 0.4 CAPSULE ORAL at 10:58

## 2025-01-11 RX ADMIN — TRAZODONE HYDROCHLORIDE 25 MG: 50 TABLET ORAL at 21:14

## 2025-01-11 RX ADMIN — PANTOPRAZOLE SODIUM 40 MG: 40 INJECTION, POWDER, FOR SOLUTION INTRAVENOUS at 10:59

## 2025-01-11 RX ADMIN — SIMETHICONE 80 MG: 80 TABLET, CHEWABLE ORAL at 15:30

## 2025-01-11 RX ADMIN — ACETAMINOPHEN 325 MG: 325 TABLET ORAL at 05:09

## 2025-01-11 RX ADMIN — ONDANSETRON 4 MG: 4 TABLET, ORALLY DISINTEGRATING ORAL at 10:58

## 2025-01-11 RX ADMIN — ACETAMINOPHEN 325 MG: 325 TABLET ORAL at 17:36

## 2025-01-11 RX ADMIN — ALPRAZOLAM 0.5 MG: 0.5 TABLET ORAL at 10:58

## 2025-01-11 RX ADMIN — LIDOCAINE HYDROCHLORIDE 2 ML: 40 SOLUTION TOPICAL at 09:18

## 2025-01-11 RX ADMIN — OXYCODONE HYDROCHLORIDE 10 MG: 10 TABLET ORAL at 12:15

## 2025-01-11 RX ADMIN — SODIUM CHLORIDE, PRESERVATIVE FREE 10 ML: 5 INJECTION INTRAVENOUS at 10:59

## 2025-01-11 RX ADMIN — OXYCODONE HYDROCHLORIDE 10 MG: 10 TABLET ORAL at 17:36

## 2025-01-11 RX ADMIN — ACETAMINOPHEN 325 MG: 325 TABLET ORAL at 12:15

## 2025-01-11 RX ADMIN — FOLIC ACID 1 MG: 1 TABLET ORAL at 10:58

## 2025-01-11 RX ADMIN — FLUCONAZOLE 200 MG: 100 TABLET ORAL at 12:15

## 2025-01-11 RX ADMIN — OXYCODONE HYDROCHLORIDE 10 MG: 10 TABLET ORAL at 05:08

## 2025-01-11 RX ADMIN — BACLOFEN 10 MG: 10 TABLET ORAL at 10:58

## 2025-01-11 RX ADMIN — SIMETHICONE 80 MG: 80 TABLET, CHEWABLE ORAL at 05:09

## 2025-01-11 RX ADMIN — PANTOPRAZOLE SODIUM 40 MG: 40 INJECTION, POWDER, FOR SOLUTION INTRAVENOUS at 21:14

## 2025-01-11 RX ADMIN — SIMETHICONE 80 MG: 80 TABLET, CHEWABLE ORAL at 10:58

## 2025-01-11 RX ADMIN — PHENYLEPHRINE HYDROCHLORIDE 250 MCG: 10 INJECTION INTRAVENOUS at 09:19

## 2025-01-11 RX ADMIN — CARBIDOPA AND LEVODOPA 1 TABLET: 25; 250 TABLET ORAL at 10:59

## 2025-01-11 RX ADMIN — ALPRAZOLAM 0.5 MG: 0.5 TABLET ORAL at 21:14

## 2025-01-11 RX ADMIN — ACETAMINOPHEN 325 MG: 325 TABLET ORAL at 00:26

## 2025-01-11 RX ADMIN — DULOXETINE HYDROCHLORIDE 60 MG: 30 CAPSULE, DELAYED RELEASE ORAL at 10:58

## 2025-01-11 ASSESSMENT — PAIN - FUNCTIONAL ASSESSMENT
PAIN_FUNCTIONAL_ASSESSMENT: ACTIVITIES ARE NOT PREVENTED
PAIN_FUNCTIONAL_ASSESSMENT: ACTIVITIES ARE NOT PREVENTED

## 2025-01-11 ASSESSMENT — PAIN DESCRIPTION - LOCATION
LOCATION: BACK
LOCATION: GENERALIZED

## 2025-01-11 ASSESSMENT — PAIN DESCRIPTION - DESCRIPTORS: DESCRIPTORS: ACHING

## 2025-01-11 ASSESSMENT — ENCOUNTER SYMPTOMS: SHORTNESS OF BREATH: 1

## 2025-01-11 ASSESSMENT — PAIN DESCRIPTION - ORIENTATION: ORIENTATION: LOWER

## 2025-01-11 ASSESSMENT — PAIN SCALES - GENERAL
PAINLEVEL_OUTOF10: 7
PAINLEVEL_OUTOF10: 6

## 2025-01-11 NOTE — PROGRESS NOTES
V2.0    [facility] Progress Note      Name:  Nerissa Brewster /Age/Sex: 1950  (74 y.o. female)   MRN & CSN:  [id number] & [id number] Encounter Date/Time: 2025 10:15 AM EST   Location:  ENDO/NONE PCP: Andres Henry MD     Attending:Bill Gray MD       Hospital Day: 3    Assessment and Recommendations   Nerissa Brewster is a 74 y.o. female who presents with Acute on chronic anemia      Plan:       Acute microcytic anemia  -Secondary to iron deficiency as evidenced on blood work  - 2 units pRBC ordered recheck transfuse if HgB < 7 g/dl  -Started on Protonix twice daily.  -Check CBCs every 12 hours and transfuse to maintain hemoglobin more than 7.  -GI consulted.  EGD with imaging evidence for Candida esophagitis although biopsies are still pending.  Mild gastritis noted as well.  Colonoscopy with internal hemorrhoids and left-sided diverticulosis.  -Will trend hemoglobins for 1 more day and if stable will DC.  -Will need capsule endoscopy as outpatient.     Acute kidney injury with hyperkalemia:   -Hold meloxicam.  Was given IV fluids.  Creatinine improved somewhat.  Improved with    H/o TAVR in 2023     Coronary artery disease s/p remote PCI to mid LAD, RCA and OM 2  Paroxysmal atrial fibrillation-anticoagulated with Eliquis hold in thesetting of suspected GI bleed due to anemia  Hyperlipidemia: Continue home meds  Parkinson's disease  Chronic thoracic/lumbar pain       Diet Diet NPO   DVT Prophylaxis [] Lovenox, []  Heparin, [x] SCDs, [] Ambulation,  [] Eliquis, [] Xarelto  [] Coumadin   Code Status DNR-CCA   Disposition From: Home  Expected Disposition: Home  Estimated Date of Discharge: 3 days patient requires continued admission due to acute on chronic anemia, GI bleed   Surrogate Decision Maker/ Chava Rowe (Child)        Personally reviewed Lab Studies and Imaging           Subjective:     Chief Complaint:   Chief Complaint   Patient presents with    Abnormal Lab     Low hgb 4.7 from

## 2025-01-11 NOTE — ANESTHESIA PRE PROCEDURE
fibrosis\"right breast\"   • CHOLECYSTECTOMY  1986   • COLONOSCOPY  2012   • CORONARY ANGIOPLASTY WITH STENT PLACEMENT      X 2\"last time was 4-5 yrs ago\"\"total a total of 6 heart stents\"   • HYSTERECTOMY (CERVIX STATUS UNKNOWN)  1994    \"took everything\"   • OTHER SURGICAL HISTORY  2010    reveal monitor insertion-removed in 2017   • TONSILLECTOMY  1955       Social History:    Social History     Tobacco Use   • Smoking status: Never   • Smokeless tobacco: Never   Substance Use Topics   • Alcohol use: No                                Counseling given: Not Answered      Vital Signs (Current):   Vitals:    01/11/25 0508 01/11/25 0518 01/11/25 0538 01/11/25 0823   BP:    (!) 162/64   Pulse:    71   Resp: 17  18 12   Temp:    98.6 °F (37 °C)   TempSrc:    Oral   SpO2:       Weight:  93 kg (205 lb 1.6 oz)     Height:                                                  BP Readings from Last 3 Encounters:   01/11/25 (!) 162/64   01/17/23 126/62   01/11/23 118/72       NPO Status: Time of last liquid consumption: 2330                        Time of last solid consumption: 0930                        Date of last liquid consumption: 01/10/25                        Date of last solid food consumption: 01/10/25    BMI:   Wt Readings from Last 3 Encounters:   01/11/25 93 kg (205 lb 1.6 oz)   01/17/23 78.2 kg (172 lb 8 oz)   01/11/23 94.8 kg (209 lb)     Body mass index is 30.29 kg/m².    CBC:   Lab Results   Component Value Date/Time    WBC 7.4 01/10/2025 04:11 AM    RBC 2.79 01/10/2025 04:11 AM    HGB 7.7 01/10/2025 04:20 PM    HCT 26.1 01/10/2025 04:20 PM    MCV 73.5 01/10/2025 04:11 AM    RDW 22.4 01/10/2025 04:11 AM     03/11/2022 07:00 AM       CMP:   Lab Results   Component Value Date/Time     01/11/2025 04:34 AM    K 4.5 01/11/2025 04:34 AM     01/11/2025 04:34 AM    CO2 21 01/11/2025 04:34 AM    BUN 24 01/11/2025 04:34 AM    CREATININE 1.0 01/11/2025 04:34 AM    GFRAA 59 03/11/2022 07:00 AM    LABGLOM 54

## 2025-01-11 NOTE — ANESTHESIA POSTPROCEDURE EVALUATION
Department of Anesthesiology  Postprocedure Note    Patient: Nerissa Brewster  MRN: [id number]  YOB: 1950  Date of evaluation: 1/11/2025    Procedure Summary       Date: 01/11/25 Room / Location: [place] [address] / [facility]    Anesthesia Start: 0909 Anesthesia Stop: 0950    Procedures:       COLONOSCOPY DIAGNOSTIC      ESOPHAGOGASTRODUODENOSCOPY BIOPSY Diagnosis:       Anemia, unspecified type      (Anemia, unspecified type [D64.9])    Surgeons: Abbie Bustos MD Responsible Provider: Misael Lindo MD    Anesthesia Type: MAC ASA Status: 4            Anesthesia Type: MAC    Leyla Phase I:      Leyla Phase II:      Anesthesia Post Evaluation    Patient location during evaluation: bedside  Patient participation: complete - patient participated  Level of consciousness: sleepy but conscious  Airway patency: patent  Nausea & Vomiting: no nausea and no vomiting  Cardiovascular status: hemodynamically stable  Respiratory status: acceptable, spontaneous ventilation and room air  Hydration status: stable  Pain management: adequate    No notable events documented.

## 2025-01-11 NOTE — OP NOTE
Operative Note      Patient: Nerissa Brewster  YOB: 1950  MRN: [id number]    Date of Procedure: 1/11/2025    Pre-Op Diagnosis Codes:      * Anemia, unspecified type [D64.9]    [facility]      BRIEF OP REPORT:    Impression:    1) EGD showing white curdy spotty exudates in the esophagus consistent with candidiasis brushings done  Rest of the esophagus nondiagnostic  Stomach normal apart from mild antral gastritis biopsy for H. pylori done  Duodenum normal biopsy for celiac done   2) colonoscopy showing grade 2 to grade 3 internal hemorrhoids  Mild to moderate left-sided diverticulosis  Dark staining of the entire mucosa consistent with melanosis biopsy of the ascending and descending colon done  Rest of the colon nondiagnostic           Suggest:   1) advance diet as tolerated, in general would recommend high-fiber diet with 64 ounces of water every day and probiotics every day   2) can be discharged from GI standpoint follow-up as outpatient   3) would recommend video capsule endoscopy as outpatient to evaluate small bowel     Full EGD/COLONOSCOPY report available by going to \"chart review\" then \"procedures\" then  \"EGD/Colonoscopy\"  then \"View Endoscopy Report\"       Please note that time of note may not reflect time of procedure     Electronically signed by Abbie Bustos MD on 1/11/2025 at 9:49 AM

## 2025-01-12 VITALS
HEART RATE: 73 BPM | BODY MASS INDEX: 29.86 KG/M2 | RESPIRATION RATE: 14 BRPM | OXYGEN SATURATION: 96 % | DIASTOLIC BLOOD PRESSURE: 71 MMHG | HEIGHT: 69 IN | WEIGHT: 201.6 LBS | TEMPERATURE: 98 F | SYSTOLIC BLOOD PRESSURE: 144 MMHG

## 2025-01-12 LAB
ANION GAP SERPL CALCULATED.3IONS-SCNC: 9 MMOL/L (ref 9–17)
BASOPHILS # BLD: 0.12 K/UL
BASOPHILS NFR BLD: 1 % (ref 0–1)
BUN SERPL-MCNC: 21 MG/DL (ref 7–20)
CALCIUM SERPL-MCNC: 8.9 MG/DL (ref 8.3–10.6)
CHLORIDE SERPL-SCNC: 108 MMOL/L (ref 99–110)
CO2 SERPL-SCNC: 26 MMOL/L (ref 21–32)
CREAT SERPL-MCNC: 1.2 MG/DL (ref 0.6–1.2)
EOSINOPHIL # BLD: 1.31 K/UL
EOSINOPHILS RELATIVE PERCENT: 13 % (ref 0–3)
ERYTHROCYTE [DISTWIDTH] IN BLOOD BY AUTOMATED COUNT: 23.7 % (ref 11.7–14.9)
GFR, ESTIMATED: 42 ML/MIN/1.73M2
GLUCOSE SERPL-MCNC: 94 MG/DL (ref 74–99)
HCT VFR BLD AUTO: 25.7 % (ref 37–47)
HGB BLD-MCNC: 7.4 G/DL (ref 12.5–16)
IMM GRANULOCYTES # BLD AUTO: 0.07 K/UL
IMM GRANULOCYTES NFR BLD: 1 %
LYMPHOCYTES NFR BLD: 1.61 K/UL
LYMPHOCYTES RELATIVE PERCENT: 16 % (ref 24–44)
MCH RBC QN AUTO: 21.9 PG (ref 27–31)
MCHC RBC AUTO-ENTMCNC: 28.8 G/DL (ref 32–36)
MCV RBC AUTO: 76 FL (ref 78–100)
MONOCYTES NFR BLD: 0.82 K/UL
MONOCYTES NFR BLD: 8 % (ref 0–4)
NEUTROPHILS NFR BLD: 62 % (ref 36–66)
NEUTS SEG NFR BLD: 6.46 K/UL
PLATELET, FLUORESCENCE: 157 K/UL (ref 140–440)
POTASSIUM SERPL-SCNC: 4.3 MMOL/L (ref 3.5–5.1)
PROCALCITONIN SERPL-MCNC: 0.12 NG/ML
RBC # BLD AUTO: 3.38 M/UL (ref 4.2–5.4)
SODIUM SERPL-SCNC: 144 MMOL/L (ref 136–145)
WBC OTHER # BLD: 10.4 K/UL (ref 4–10.5)

## 2025-01-12 PROCEDURE — 36415 COLL VENOUS BLD VENIPUNCTURE: CPT

## 2025-01-12 PROCEDURE — 6370000000 HC RX 637 (ALT 250 FOR IP): Performed by: INTERNAL MEDICINE

## 2025-01-12 PROCEDURE — 2580000003 HC RX 258: Performed by: INTERNAL MEDICINE

## 2025-01-12 PROCEDURE — 6360000002 HC RX W HCPCS: Performed by: INTERNAL MEDICINE

## 2025-01-12 PROCEDURE — 85025 COMPLETE CBC W/AUTO DIFF WBC: CPT

## 2025-01-12 PROCEDURE — 2500000003 HC RX 250 WO HCPCS: Performed by: INTERNAL MEDICINE

## 2025-01-12 PROCEDURE — 94761 N-INVAS EAR/PLS OXIMETRY MLT: CPT

## 2025-01-12 PROCEDURE — 80048 BASIC METABOLIC PNL TOTAL CA: CPT

## 2025-01-12 PROCEDURE — 84145 PROCALCITONIN (PCT): CPT

## 2025-01-12 RX ORDER — FLUCONAZOLE 200 MG/1
200 TABLET ORAL DAILY
Qty: 8 TABLET | Refills: 0 | Status: SHIPPED | OUTPATIENT
Start: 2025-01-13 | End: 2025-01-21

## 2025-01-12 RX ORDER — APIXABAN 5 MG/1
5 TABLET, FILM COATED ORAL 2 TIMES DAILY
Qty: 60 TABLET | Refills: 1
Start: 2025-01-15

## 2025-01-12 RX ADMIN — IRON SUCROSE 200 MG: 20 INJECTION, SOLUTION INTRAVENOUS at 06:27

## 2025-01-12 RX ADMIN — FUROSEMIDE 40 MG: 40 TABLET ORAL at 08:20

## 2025-01-12 RX ADMIN — BACLOFEN 10 MG: 10 TABLET ORAL at 08:20

## 2025-01-12 RX ADMIN — ALPRAZOLAM 0.5 MG: 0.5 TABLET ORAL at 08:20

## 2025-01-12 RX ADMIN — AMLODIPINE BESYLATE 10 MG: 10 TABLET ORAL at 08:20

## 2025-01-12 RX ADMIN — FOLIC ACID 1 MG: 1 TABLET ORAL at 08:20

## 2025-01-12 RX ADMIN — TAMSULOSIN HYDROCHLORIDE 0.4 MG: 0.4 CAPSULE ORAL at 08:20

## 2025-01-12 RX ADMIN — DOCUSATE SODIUM 100 MG: 100 CAPSULE, LIQUID FILLED ORAL at 08:20

## 2025-01-12 RX ADMIN — SODIUM CHLORIDE, PRESERVATIVE FREE 5 ML: 5 INJECTION INTRAVENOUS at 08:22

## 2025-01-12 RX ADMIN — DULOXETINE HYDROCHLORIDE 60 MG: 30 CAPSULE, DELAYED RELEASE ORAL at 08:20

## 2025-01-12 RX ADMIN — OXYCODONE HYDROCHLORIDE 10 MG: 10 TABLET ORAL at 00:16

## 2025-01-12 RX ADMIN — ACETAMINOPHEN 325 MG: 325 TABLET ORAL at 06:21

## 2025-01-12 RX ADMIN — ACETAMINOPHEN 325 MG: 325 TABLET ORAL at 00:16

## 2025-01-12 RX ADMIN — ACETAMINOPHEN 325 MG: 325 TABLET ORAL at 12:48

## 2025-01-12 RX ADMIN — PANTOPRAZOLE SODIUM 40 MG: 40 INJECTION, POWDER, FOR SOLUTION INTRAVENOUS at 08:20

## 2025-01-12 RX ADMIN — OXYCODONE HYDROCHLORIDE 10 MG: 10 TABLET ORAL at 06:17

## 2025-01-12 RX ADMIN — OXYCODONE HYDROCHLORIDE 10 MG: 10 TABLET ORAL at 12:48

## 2025-01-12 RX ADMIN — SERTRALINE HYDROCHLORIDE 100 MG: 50 TABLET ORAL at 08:20

## 2025-01-12 RX ADMIN — FLUCONAZOLE 200 MG: 100 TABLET ORAL at 08:20

## 2025-01-12 RX ADMIN — CARBIDOPA AND LEVODOPA 1 TABLET: 25; 250 TABLET ORAL at 08:19

## 2025-01-12 ASSESSMENT — PAIN DESCRIPTION - LOCATION: LOCATION: GENERALIZED

## 2025-01-12 ASSESSMENT — PAIN - FUNCTIONAL ASSESSMENT: PAIN_FUNCTIONAL_ASSESSMENT: ACTIVITIES ARE NOT PREVENTED

## 2025-01-12 ASSESSMENT — PAIN SCALES - GENERAL: PAINLEVEL_OUTOF10: 0

## 2025-01-12 NOTE — PLAN OF CARE
Problem: Chronic Conditions and Co-morbidities  Goal: Patient's chronic conditions and co-morbidity symptoms are monitored and maintained or improved  Outcome: Progressing  Flowsheets (Taken 1/12/2025 0815)  Care Plan - Patient's Chronic Conditions and Co-Morbidity Symptoms are Monitored and Maintained or Improved: Monitor and assess patient's chronic conditions and comorbid symptoms for stability, deterioration, or improvement     Problem: Discharge Planning  Goal: Discharge to home or other facility with appropriate resources  Outcome: Progressing  Flowsheets (Taken 1/12/2025 0815)  Discharge to home or other facility with appropriate resources: Identify barriers to discharge with patient and caregiver     Problem: Safety - Adult  Goal: Free from fall injury  Outcome: Progressing     Problem: ABCDS Injury Assessment  Goal: Absence of physical injury  Outcome: Progressing     Problem: Skin/Tissue Integrity  Goal: Absence of new skin breakdown  Description: 1.  Monitor for areas of redness and/or skin breakdown  2.  Assess vascular access sites hourly  3.  Every 4-6 hours minimum:  Change oxygen saturation probe site  4.  Every 4-6 hours:  If on nasal continuous positive airway pressure, respiratory therapy assess nares and determine need for appliance change or resting period.  Outcome: Progressing

## 2025-01-12 NOTE — DISCHARGE SUMMARY
V2.0  Discharge Summary    Name:  Nerissa Brewster /Age/Sex: 1950 (74 y.o. female)   Admit Date: 2025  Discharge Date: 25    MRN & CSN:  [id number] & [id number] Encounter Date and Time 25 1:41 PM EST    Attending:  Bill Gray MD Discharging Provider: Bill Gray MD       Hospital Course:     Brief HPI:Nerissa Brewster is a 74 y.o. female with pmh of as above who presents with fatigue and abnormal labs as outpatient. Patient states that she has been feeling weak for months and last few days it was gradually worsening she has been having episode of dizziness and falls also occasional sob denies any blood in stool or darker stools, denies nausea and vomiting or heart burn does not remember if she has had c-scope she has had EGD atleast 5 years ago. Denies smoking alcohol or recreational drug use     Brief Problem Based Course:     Acute microcytic anemia  -Secondary to iron deficiency as evidenced on blood work  - 2 units pRBC ordered recheck transfuse if HgB < 7 g/dl  -Started on Protonix twice daily.  -Check CBCs every 12 hours and transfuse to maintain hemoglobin more than 7.  -GI consulted.  EGD with imaging evidence for Candida esophagitis although biopsies are still pending started on fluconazole as per GI recommendations.  Mild gastritis noted as well.  Colonoscopy with internal hemorrhoids and left-sided diverticulosis.  -Was given iron infusions while in hospital.  Hemoglobin stabilized better discharge.  Discussed with GI as per the recommendations okay for discharge home and plan for capsule endoscopy as outpatient.  Patient given strict written instructions to follow-up with GI for biopsy results from EGD and colonoscopy and to schedule Capsule endoscopy.  As per GI recommendations okay to resume Eliquis 3 days after these procedure.     Acute kidney injury with hyperkalemia:   -Hold meloxicam.  Was given IV fluids.  Creatinine improved somewhat.  Repeat labs with PCP as

## 2025-01-12 NOTE — DISCHARGE INSTRUCTIONS
Restart taking Eliquis on Wednesday January 15.   Follow-up with GI in office within 2 to 3 weeks to discuss capsule endoscopy.

## 2025-01-12 NOTE — PROGRESS NOTES
Comprehensive Nutrition Assessment    Type and Reason for Visit:  Initial, Positive nutrition screen (unsure of wt loss)    Nutrition Recommendations/Plan:   Continue Regular Diet  Begin low geraldine high protein supplement daily, as needed       Nutrition Assessment:    Pt admitted with acute microcytic anemia, acute kidney injury with hyperkalemia. H/O CAD, paroxysmal atrial fibrillation, HLD, Parkinson's disease, chronic thoracic/lumbar pain. S/P EGD/colonoscopy yesterday. GI recommends advance diet as tolerated, in general a high-fiber diet with 64 ounces of water and probiotics every day. Eating well on Regular Diet at this time. No wt loss over the past couple yrs per Epic history. Some loss over stay noted with bowel prep. No wounds. Will continue to follow as low nutrition risk.    Nutrition Related Findings:    BUN 21, GFR 42, H/H 7.7/26.3   Wound Type: None       Current Nutrition Intake & Therapies:    Average Meal Intake: %  Average Supplements Intake: None Ordered  ADULT DIET; Regular    Anthropometric Measures:  Height: 175.3 cm (5' 9\")  Ideal Body Weight (IBW): 145 lbs (66 kg)    Admission Body Weight: 97.1 kg (214 lb 1.1 oz)  Current Body Weight: 91.4 kg (201 lb 8 oz),   IBW. Weight Source: Bed scale  Current BMI (kg/m2): 29.7  Usual Body Weight: 78.2 kg (172 lb 8 oz) (1/17/23)     % Weight Change (Calculated): 16.8                    BMI Categories: Overweight (BMI 25.0-29.9)      Nutrition Diagnosis:   No nutrition diagnosis at this time     Nutrition Interventions:   Food and/or Nutrient Delivery: Continue Current Diet, Start Oral Nutrition Supplement, Mineral Supplement, Vitamin Supplement  Nutrition Education/Counseling: No recommendation at this time  Coordination of Nutrition Care: Continue to monitor while inpatient       Goals:  Goals: Meet at least 75% of estimated needs          Nutrition Monitoring and Evaluation:   Behavioral-Environmental Outcomes: None Identified  Food/Nutrient Intake

## 2025-01-14 LAB — NON-GYN CYTOLOGY REPORT: NORMAL

## 2025-01-15 LAB — SURGICAL PATHOLOGY REPORT: NORMAL

## 2025-01-23 ENCOUNTER — APPOINTMENT (OUTPATIENT)
Dept: GENERAL RADIOLOGY | Age: 75
DRG: 682 | End: 2025-01-23
Payer: COMMERCIAL

## 2025-01-23 ENCOUNTER — HOSPITAL ENCOUNTER (INPATIENT)
Age: 75
LOS: 5 days | Discharge: NURSING, REHAB OR LONG-TERM CARE | DRG: 682 | End: 2025-01-28
Attending: EMERGENCY MEDICINE | Admitting: STUDENT IN AN ORGANIZED HEALTH CARE EDUCATION/TRAINING PROGRAM
Payer: COMMERCIAL

## 2025-01-23 ENCOUNTER — APPOINTMENT (OUTPATIENT)
Dept: CT IMAGING | Age: 75
DRG: 682 | End: 2025-01-23
Payer: COMMERCIAL

## 2025-01-23 DIAGNOSIS — M54.42 CHRONIC LEFT-SIDED LOW BACK PAIN WITH LEFT-SIDED SCIATICA: ICD-10-CM

## 2025-01-23 DIAGNOSIS — N17.9 AKI (ACUTE KIDNEY INJURY) (HCC): Primary | ICD-10-CM

## 2025-01-23 DIAGNOSIS — F41.9 ANXIETY: ICD-10-CM

## 2025-01-23 DIAGNOSIS — E87.5 HYPERKALEMIA: ICD-10-CM

## 2025-01-23 DIAGNOSIS — G89.29 CHRONIC LEFT-SIDED LOW BACK PAIN WITH LEFT-SIDED SCIATICA: ICD-10-CM

## 2025-01-23 LAB
ABO + RH BLD: NORMAL
ALBUMIN SERPL-MCNC: 4.1 G/DL (ref 3.4–5)
ALBUMIN/GLOB SERPL: 1.5 {RATIO} (ref 1.1–2.2)
ALP SERPL-CCNC: 117 U/L (ref 40–129)
ALT SERPL-CCNC: 7 U/L (ref 10–40)
ANION GAP SERPL CALCULATED.3IONS-SCNC: 10 MMOL/L (ref 9–17)
AST SERPL-CCNC: 19 U/L (ref 15–37)
BACTERIA URNS QL MICRO: ABNORMAL
BASOPHILS # BLD: 0.11 K/UL
BASOPHILS NFR BLD: 1 % (ref 0–1)
BILIRUB SERPL-MCNC: <0.2 MG/DL (ref 0–1)
BILIRUB UR QL STRIP: NEGATIVE
BLOOD BANK SAMPLE EXPIRATION: NORMAL
BLOOD GROUP ANTIBODIES SERPL: NEGATIVE
BNP SERPL-MCNC: 724 PG/ML (ref 0–125)
BUN SERPL-MCNC: 37 MG/DL (ref 7–20)
CALCIUM SERPL-MCNC: 9.1 MG/DL (ref 8.3–10.6)
CHLORIDE SERPL-SCNC: 108 MMOL/L (ref 99–110)
CHP ED QC CHECK: YES
CHP ED QC CHECK: YES
CLARITY UR: CLEAR
CO2 SERPL-SCNC: 23 MMOL/L (ref 21–32)
COLOR UR: YELLOW
CREAT SERPL-MCNC: 1.9 MG/DL (ref 0.6–1.2)
EOSINOPHIL # BLD: 1.79 K/UL
EOSINOPHILS RELATIVE PERCENT: 19 % (ref 0–3)
EPI CELLS #/AREA URNS HPF: <1 /HPF
ERYTHROCYTE [DISTWIDTH] IN BLOOD BY AUTOMATED COUNT: 30.6 % (ref 11.7–14.9)
GFR, ESTIMATED: 26 ML/MIN/1.73M2
GLUCOSE BLD-MCNC: 57 MG/DL
GLUCOSE BLD-MCNC: 57 MG/DL (ref 74–99)
GLUCOSE BLD-MCNC: 75 MG/DL
GLUCOSE BLD-MCNC: 75 MG/DL (ref 74–99)
GLUCOSE BLD-MCNC: 99 MG/DL (ref 74–99)
GLUCOSE SERPL-MCNC: 83 MG/DL (ref 74–99)
GLUCOSE UR STRIP-MCNC: NEGATIVE MG/DL
HCT VFR BLD AUTO: 30.5 % (ref 37–47)
HGB BLD-MCNC: 8.9 G/DL (ref 12.5–16)
HGB UR QL STRIP.AUTO: NEGATIVE
IMM GRANULOCYTES # BLD AUTO: 0.04 K/UL
IMM GRANULOCYTES NFR BLD: 0 %
INR PPP: 1.1
KETONES UR STRIP-MCNC: NEGATIVE MG/DL
LACTATE BLDV-SCNC: 0.6 MMOL/L (ref 0.4–2)
LEUKOCYTE ESTERASE UR QL STRIP: ABNORMAL
LYMPHOCYTES NFR BLD: 1.78 K/UL
LYMPHOCYTES RELATIVE PERCENT: 19 % (ref 24–44)
MAGNESIUM SERPL-MCNC: 2.6 MG/DL (ref 1.8–2.4)
MCH RBC QN AUTO: 23.9 PG (ref 27–31)
MCHC RBC AUTO-ENTMCNC: 29.2 G/DL (ref 32–36)
MCV RBC AUTO: 82 FL (ref 78–100)
MONOCYTES NFR BLD: 0.63 K/UL
MONOCYTES NFR BLD: 7 % (ref 0–4)
NEUTROPHILS NFR BLD: 53 % (ref 36–66)
NEUTS SEG NFR BLD: 4.97 K/UL
NITRITE UR QL STRIP: NEGATIVE
PARTIAL THROMBOPLASTIN TIME: 32 SEC (ref 25.1–37.1)
PH UR STRIP: 6 [PH] (ref 5–8)
PLATELET, FLUORESCENCE: 169 K/UL (ref 140–440)
POTASSIUM SERPL-SCNC: 5.7 MMOL/L (ref 3.5–5.1)
PROT SERPL-MCNC: 6.8 G/DL (ref 6.4–8.2)
PROT UR STRIP-MCNC: 30 MG/DL
PROTHROMBIN TIME: 14.5 SEC (ref 11.7–14.5)
RBC # BLD AUTO: 3.72 M/UL (ref 4.2–5.4)
RBC #/AREA URNS HPF: 9 /HPF (ref 0–2)
SODIUM SERPL-SCNC: 141 MMOL/L (ref 136–145)
SP GR UR STRIP: 1.01 (ref 1–1.03)
TROPONIN I SERPL HS-MCNC: 15 NG/L (ref 0–14)
TROPONIN I SERPL HS-MCNC: 16 NG/L (ref 0–14)
UROBILINOGEN UR STRIP-ACNC: 0.2 EU/DL (ref 0–1)
WBC #/AREA URNS HPF: 241 /HPF (ref 0–5)
WBC OTHER # BLD: 9.3 K/UL (ref 4–10.5)

## 2025-01-23 PROCEDURE — 86900 BLOOD TYPING SEROLOGIC ABO: CPT

## 2025-01-23 PROCEDURE — 71045 X-RAY EXAM CHEST 1 VIEW: CPT

## 2025-01-23 PROCEDURE — 82962 GLUCOSE BLOOD TEST: CPT

## 2025-01-23 PROCEDURE — 2500000003 HC RX 250 WO HCPCS: Performed by: EMERGENCY MEDICINE

## 2025-01-23 PROCEDURE — 85610 PROTHROMBIN TIME: CPT

## 2025-01-23 PROCEDURE — 36415 COLL VENOUS BLD VENIPUNCTURE: CPT

## 2025-01-23 PROCEDURE — 86850 RBC ANTIBODY SCREEN: CPT

## 2025-01-23 PROCEDURE — 96375 TX/PRO/DX INJ NEW DRUG ADDON: CPT

## 2025-01-23 PROCEDURE — 6370000000 HC RX 637 (ALT 250 FOR IP): Performed by: EMERGENCY MEDICINE

## 2025-01-23 PROCEDURE — 80053 COMPREHEN METABOLIC PANEL: CPT

## 2025-01-23 PROCEDURE — 1200000000 HC SEMI PRIVATE

## 2025-01-23 PROCEDURE — 99285 EMERGENCY DEPT VISIT HI MDM: CPT

## 2025-01-23 PROCEDURE — 93005 ELECTROCARDIOGRAM TRACING: CPT | Performed by: EMERGENCY MEDICINE

## 2025-01-23 PROCEDURE — 85730 THROMBOPLASTIN TIME PARTIAL: CPT

## 2025-01-23 PROCEDURE — 85025 COMPLETE CBC W/AUTO DIFF WBC: CPT

## 2025-01-23 PROCEDURE — 2580000003 HC RX 258: Performed by: EMERGENCY MEDICINE

## 2025-01-23 PROCEDURE — 81001 URINALYSIS AUTO W/SCOPE: CPT

## 2025-01-23 PROCEDURE — 83605 ASSAY OF LACTIC ACID: CPT

## 2025-01-23 PROCEDURE — 83880 ASSAY OF NATRIURETIC PEPTIDE: CPT

## 2025-01-23 PROCEDURE — 96374 THER/PROPH/DIAG INJ IV PUSH: CPT

## 2025-01-23 PROCEDURE — 70450 CT HEAD/BRAIN W/O DYE: CPT

## 2025-01-23 PROCEDURE — 86901 BLOOD TYPING SEROLOGIC RH(D): CPT

## 2025-01-23 PROCEDURE — 84484 ASSAY OF TROPONIN QUANT: CPT

## 2025-01-23 PROCEDURE — 94761 N-INVAS EAR/PLS OXIMETRY MLT: CPT

## 2025-01-23 PROCEDURE — 83735 ASSAY OF MAGNESIUM: CPT

## 2025-01-23 RX ORDER — PANTOPRAZOLE SODIUM 20 MG/1
20 TABLET, DELAYED RELEASE ORAL DAILY
Status: DISCONTINUED | OUTPATIENT
Start: 2025-01-24 | End: 2025-01-28 | Stop reason: HOSPADM

## 2025-01-23 RX ORDER — SODIUM CHLORIDE 0.9 % (FLUSH) 0.9 %
5-40 SYRINGE (ML) INJECTION PRN
Status: DISCONTINUED | OUTPATIENT
Start: 2025-01-23 | End: 2025-01-28 | Stop reason: HOSPADM

## 2025-01-23 RX ORDER — SPIRONOLACTONE 50 MG/1
25 TABLET, FILM COATED ORAL DAILY
Status: DISCONTINUED | OUTPATIENT
Start: 2025-01-24 | End: 2025-01-28 | Stop reason: HOSPADM

## 2025-01-23 RX ORDER — ONDANSETRON 4 MG/1
4 TABLET, ORALLY DISINTEGRATING ORAL EVERY 8 HOURS PRN
Status: DISCONTINUED | OUTPATIENT
Start: 2025-01-23 | End: 2025-01-28 | Stop reason: HOSPADM

## 2025-01-23 RX ORDER — POTASSIUM CHLORIDE 7.45 MG/ML
10 INJECTION INTRAVENOUS PRN
Status: CANCELLED | OUTPATIENT
Start: 2025-01-23 | End: 2025-01-28

## 2025-01-23 RX ORDER — OXYCODONE AND ACETAMINOPHEN 10; 325 MG/1; MG/1
1 TABLET ORAL EVERY 6 HOURS PRN
Status: DISCONTINUED | OUTPATIENT
Start: 2025-01-23 | End: 2025-01-23

## 2025-01-23 RX ORDER — ASPIRIN 81 MG/1
81 TABLET, CHEWABLE ORAL DAILY
Status: DISCONTINUED | OUTPATIENT
Start: 2025-01-24 | End: 2025-01-28 | Stop reason: HOSPADM

## 2025-01-23 RX ORDER — GLUCAGON 1 MG/ML
1 KIT INJECTION PRN
Status: DISCONTINUED | OUTPATIENT
Start: 2025-01-23 | End: 2025-01-28 | Stop reason: HOSPADM

## 2025-01-23 RX ORDER — SENNA AND DOCUSATE SODIUM 50; 8.6 MG/1; MG/1
2 TABLET, FILM COATED ORAL 2 TIMES DAILY
Status: DISCONTINUED | OUTPATIENT
Start: 2025-01-24 | End: 2025-01-28 | Stop reason: HOSPADM

## 2025-01-23 RX ORDER — ACETAMINOPHEN 325 MG/1
650 TABLET ORAL EVERY 6 HOURS PRN
Status: DISCONTINUED | OUTPATIENT
Start: 2025-01-23 | End: 2025-01-28 | Stop reason: HOSPADM

## 2025-01-23 RX ORDER — HYDRALAZINE HYDROCHLORIDE 25 MG/1
25 TABLET, FILM COATED ORAL 3 TIMES DAILY
Status: DISCONTINUED | OUTPATIENT
Start: 2025-01-24 | End: 2025-01-28

## 2025-01-23 RX ORDER — SODIUM CHLORIDE, SODIUM LACTATE, POTASSIUM CHLORIDE, CALCIUM CHLORIDE 600; 310; 30; 20 MG/100ML; MG/100ML; MG/100ML; MG/100ML
INJECTION, SOLUTION INTRAVENOUS CONTINUOUS
Status: DISPENSED | OUTPATIENT
Start: 2025-01-23 | End: 2025-01-24

## 2025-01-23 RX ORDER — ACETAMINOPHEN 650 MG/1
650 SUPPOSITORY RECTAL EVERY 6 HOURS PRN
Status: DISCONTINUED | OUTPATIENT
Start: 2025-01-23 | End: 2025-01-28 | Stop reason: HOSPADM

## 2025-01-23 RX ORDER — FOLIC ACID 1 MG/1
1 TABLET ORAL DAILY
Status: DISCONTINUED | OUTPATIENT
Start: 2025-01-24 | End: 2025-01-28 | Stop reason: HOSPADM

## 2025-01-23 RX ORDER — DULOXETIN HYDROCHLORIDE 30 MG/1
60 CAPSULE, DELAYED RELEASE ORAL DAILY
Status: DISCONTINUED | OUTPATIENT
Start: 2025-01-24 | End: 2025-01-28 | Stop reason: HOSPADM

## 2025-01-23 RX ORDER — ACETAMINOPHEN 325 MG/1
325 TABLET ORAL EVERY 8 HOURS PRN
Status: DISCONTINUED | OUTPATIENT
Start: 2025-01-23 | End: 2025-01-24

## 2025-01-23 RX ORDER — PREGABALIN 75 MG/1
75 CAPSULE ORAL 2 TIMES DAILY
Status: DISCONTINUED | OUTPATIENT
Start: 2025-01-24 | End: 2025-01-28 | Stop reason: HOSPADM

## 2025-01-23 RX ORDER — POTASSIUM CHLORIDE 750 MG/1
10 TABLET, EXTENDED RELEASE ORAL 2 TIMES DAILY
Status: DISCONTINUED | OUTPATIENT
Start: 2025-01-23 | End: 2025-01-28 | Stop reason: HOSPADM

## 2025-01-23 RX ORDER — ONDANSETRON 2 MG/ML
4 INJECTION INTRAMUSCULAR; INTRAVENOUS EVERY 6 HOURS PRN
Status: DISCONTINUED | OUTPATIENT
Start: 2025-01-23 | End: 2025-01-28 | Stop reason: HOSPADM

## 2025-01-23 RX ORDER — MAGNESIUM SULFATE IN WATER 40 MG/ML
2000 INJECTION, SOLUTION INTRAVENOUS PRN
Status: DISCONTINUED | OUTPATIENT
Start: 2025-01-23 | End: 2025-01-28 | Stop reason: HOSPADM

## 2025-01-23 RX ORDER — LOSARTAN POTASSIUM 100 MG/1
100 TABLET ORAL DAILY
Status: DISCONTINUED | OUTPATIENT
Start: 2025-01-24 | End: 2025-01-28

## 2025-01-23 RX ORDER — OXYCODONE HYDROCHLORIDE 10 MG/1
10 TABLET ORAL EVERY 6 HOURS PRN
Status: DISCONTINUED | OUTPATIENT
Start: 2025-01-23 | End: 2025-01-24

## 2025-01-23 RX ORDER — CARBIDOPA/LEVODOPA 25MG-250MG
1 TABLET ORAL 2 TIMES DAILY
Status: DISCONTINUED | OUTPATIENT
Start: 2025-01-24 | End: 2025-01-28 | Stop reason: HOSPADM

## 2025-01-23 RX ORDER — DEXTROSE MONOHYDRATE 100 MG/ML
INJECTION, SOLUTION INTRAVENOUS CONTINUOUS PRN
Status: DISCONTINUED | OUTPATIENT
Start: 2025-01-23 | End: 2025-01-28 | Stop reason: HOSPADM

## 2025-01-23 RX ORDER — 0.9 % SODIUM CHLORIDE 0.9 %
1000 INTRAVENOUS SOLUTION INTRAVENOUS ONCE
Status: COMPLETED | OUTPATIENT
Start: 2025-01-23 | End: 2025-01-23

## 2025-01-23 RX ORDER — SODIUM CHLORIDE 9 MG/ML
INJECTION, SOLUTION INTRAVENOUS PRN
Status: DISCONTINUED | OUTPATIENT
Start: 2025-01-23 | End: 2025-01-28 | Stop reason: HOSPADM

## 2025-01-23 RX ORDER — BACLOFEN 10 MG/1
10 TABLET ORAL 3 TIMES DAILY
Status: DISCONTINUED | OUTPATIENT
Start: 2025-01-24 | End: 2025-01-28 | Stop reason: HOSPADM

## 2025-01-23 RX ORDER — BISACODYL 10 MG
10 SUPPOSITORY, RECTAL RECTAL DAILY PRN
Status: DISCONTINUED | OUTPATIENT
Start: 2025-01-23 | End: 2025-01-28 | Stop reason: HOSPADM

## 2025-01-23 RX ORDER — BUMETANIDE 1 MG/1
1 TABLET ORAL DAILY
Status: DISCONTINUED | OUTPATIENT
Start: 2025-01-24 | End: 2025-01-28

## 2025-01-23 RX ORDER — POLYETHYLENE GLYCOL 3350 17 G/17G
17 POWDER, FOR SOLUTION ORAL 2 TIMES DAILY
Status: DISCONTINUED | OUTPATIENT
Start: 2025-01-24 | End: 2025-01-28 | Stop reason: HOSPADM

## 2025-01-23 RX ORDER — DOCUSATE SODIUM 100 MG/1
100 CAPSULE, LIQUID FILLED ORAL 2 TIMES DAILY
Status: DISCONTINUED | OUTPATIENT
Start: 2025-01-24 | End: 2025-01-28 | Stop reason: HOSPADM

## 2025-01-23 RX ORDER — ATORVASTATIN CALCIUM 10 MG/1
20 TABLET, FILM COATED ORAL NIGHTLY
Status: DISCONTINUED | OUTPATIENT
Start: 2025-01-24 | End: 2025-01-28 | Stop reason: HOSPADM

## 2025-01-23 RX ORDER — SODIUM CHLORIDE 0.9 % (FLUSH) 0.9 %
5-40 SYRINGE (ML) INJECTION 2 TIMES DAILY
Status: DISCONTINUED | OUTPATIENT
Start: 2025-01-24 | End: 2025-01-28 | Stop reason: HOSPADM

## 2025-01-23 RX ADMIN — DEXTROSE MONOHYDRATE 250 ML: 100 INJECTION, SOLUTION INTRAVENOUS at 19:30

## 2025-01-23 RX ADMIN — SODIUM CHLORIDE 1000 ML: 9 INJECTION, SOLUTION INTRAVENOUS at 19:30

## 2025-01-23 RX ADMIN — SODIUM BICARBONATE 50 MEQ: 84 INJECTION, SOLUTION INTRAVENOUS at 19:33

## 2025-01-23 RX ADMIN — SODIUM ZIRCONIUM CYCLOSILICATE 10 G: 5 POWDER, FOR SUSPENSION ORAL at 19:29

## 2025-01-23 RX ADMIN — INSULIN HUMAN 10 UNITS: 100 INJECTION, SOLUTION PARENTERAL at 19:33

## 2025-01-23 ASSESSMENT — PAIN DESCRIPTION - DESCRIPTORS: DESCRIPTORS: ACHING

## 2025-01-23 ASSESSMENT — PAIN SCALES - GENERAL: PAINLEVEL_OUTOF10: 6

## 2025-01-23 ASSESSMENT — PAIN DESCRIPTION - LOCATION: LOCATION: BACK

## 2025-01-23 ASSESSMENT — PAIN - FUNCTIONAL ASSESSMENT: PAIN_FUNCTIONAL_ASSESSMENT: 0-10

## 2025-01-23 ASSESSMENT — PAIN DESCRIPTION - PAIN TYPE: TYPE: CHRONIC PAIN

## 2025-01-23 ASSESSMENT — PAIN DESCRIPTION - ORIENTATION: ORIENTATION: RIGHT;LEFT;LOWER

## 2025-01-23 NOTE — ED PROVIDER NOTES
Triage Chief Complaint:   Fatigue    [place]:  Nerissa Brewster is a 74 y.o. female that presents with increased fatigue.  Patient is concerned that she might be anemic again as she has exact same symptoms she had on recent mission earlier this month when she was found to have a hemoglobin of 4.7.  Patient denies any overt bleeding.  Patient reports lightheadedness and generalized weakness.  Patient actually fell 1 week ago because of this.  No fall signs.  Patient is supposed to follow-up with GI tomorrow.    Patient actually had EGD while hospitalized earlier this month with she did demonstrate Candida esophagitis.  Patient also received iron infusions and 2 units of blood.  Hemoglobin stabilized.  Patient is on Eliquis restarted following her last discharge.    Patient denies any recent illnesses.  Patient has been tolerating normal diet.  No pain at this time.    ROS:  General:  No fevers, no chills, + generalized weakness, + fatigue  Eyes:  No recent vison changes, no discharge  ENT:  No sore throat, no nasal congestion, no hearing changes  Cardiovascular:  No chest pain, no palpitations  Respiratory:  No shortness of breath, no cough, no wheezing  Gastrointestinal:  No pain, no nausea, no vomiting, no diarrhea  Musculoskeletal:  No muscle pain, no joint pain  Skin:  No rash, no pruritis, no easy bruising  Neurologic:  No speech problems, no headache, no extremity numbness, no extremity tingling, no extremity weakness  Psychiatric:  No anxiety  Genitourinary:  No dysuria, no hematuria  Extremities:  no edema, no pain    Past Medical History:   Diagnosis Date    A-fib ([facility])     ACS (acute coronary syndrome) ([facility]) 6/16/2014    TRINO (acute kidney injury) ([facility]) 5/21/2017    Anxiety disorder     Arthritis     Atrial fibrillation ([facility])     Blood transfusion     CAD (coronary artery disease)     \"have 6 heart stents- follows with Dr Ramana Leon    Cerebral artery occlusion with cerebral infarction ([facility])     CHF (congestive

## 2025-01-24 LAB
ALBUMIN SERPL-MCNC: 3.6 G/DL (ref 3.4–5)
ALBUMIN/GLOB SERPL: 1.6 {RATIO} (ref 1.1–2.2)
ALP SERPL-CCNC: 102 U/L (ref 40–129)
ALT SERPL-CCNC: 7 U/L (ref 10–40)
ANION GAP SERPL CALCULATED.3IONS-SCNC: 9 MMOL/L (ref 9–17)
AST SERPL-CCNC: 18 U/L (ref 15–37)
BASOPHILS # BLD: 0.1 K/UL
BASOPHILS NFR BLD: 1 % (ref 0–1)
BILIRUB SERPL-MCNC: <0.2 MG/DL (ref 0–1)
BUN SERPL-MCNC: 34 MG/DL (ref 7–20)
CALCIUM SERPL-MCNC: 8.9 MG/DL (ref 8.3–10.6)
CHLORIDE SERPL-SCNC: 110 MMOL/L (ref 99–110)
CO2 SERPL-SCNC: 24 MMOL/L (ref 21–32)
CREAT SERPL-MCNC: 1.5 MG/DL (ref 0.6–1.2)
EKG ATRIAL RATE: 54 BPM
EKG DIAGNOSIS: NORMAL
EKG P AXIS: 46 DEGREES
EKG P-R INTERVAL: 164 MS
EKG Q-T INTERVAL: 450 MS
EKG QRS DURATION: 98 MS
EKG QTC CALCULATION (BAZETT): 426 MS
EKG R AXIS: -40 DEGREES
EKG T AXIS: 8 DEGREES
EKG VENTRICULAR RATE: 54 BPM
EOSINOPHIL # BLD: 1.31 K/UL
EOSINOPHILS RELATIVE PERCENT: 16 % (ref 0–3)
ERYTHROCYTE [DISTWIDTH] IN BLOOD BY AUTOMATED COUNT: 30.9 % (ref 11.7–14.9)
GFR, ESTIMATED: 33 ML/MIN/1.73M2
GLUCOSE BLD-MCNC: 156 MG/DL (ref 74–99)
GLUCOSE BLD-MCNC: 70 MG/DL (ref 74–99)
GLUCOSE SERPL-MCNC: 140 MG/DL (ref 74–99)
HCT VFR BLD AUTO: 30.2 % (ref 37–47)
HGB BLD-MCNC: 8.7 G/DL (ref 12.5–16)
IMM GRANULOCYTES # BLD AUTO: 0.04 K/UL
IMM GRANULOCYTES NFR BLD: 1 %
LYMPHOCYTES NFR BLD: 1.31 K/UL
LYMPHOCYTES RELATIVE PERCENT: 16 % (ref 24–44)
MCH RBC QN AUTO: 23.7 PG (ref 27–31)
MCHC RBC AUTO-ENTMCNC: 28.8 G/DL (ref 32–36)
MCV RBC AUTO: 82.3 FL (ref 78–100)
MONOCYTES NFR BLD: 0.56 K/UL
MONOCYTES NFR BLD: 7 % (ref 0–4)
NEUTROPHILS NFR BLD: 60 % (ref 36–66)
NEUTS SEG NFR BLD: 4.98 K/UL
PLATELET, FLUORESCENCE: 148 K/UL (ref 140–440)
POTASSIUM SERPL-SCNC: 5.1 MMOL/L (ref 3.5–5.1)
PROT SERPL-MCNC: 5.9 G/DL (ref 6.4–8.2)
RBC # BLD AUTO: 3.67 M/UL (ref 4.2–5.4)
SODIUM SERPL-SCNC: 143 MMOL/L (ref 136–145)
TSH SERPL DL<=0.05 MIU/L-ACNC: 2.01 UIU/ML (ref 0.27–4.2)
WBC OTHER # BLD: 8.3 K/UL (ref 4–10.5)

## 2025-01-24 PROCEDURE — 36415 COLL VENOUS BLD VENIPUNCTURE: CPT

## 2025-01-24 PROCEDURE — 80053 COMPREHEN METABOLIC PANEL: CPT

## 2025-01-24 PROCEDURE — 6370000000 HC RX 637 (ALT 250 FOR IP): Performed by: STUDENT IN AN ORGANIZED HEALTH CARE EDUCATION/TRAINING PROGRAM

## 2025-01-24 PROCEDURE — 82962 GLUCOSE BLOOD TEST: CPT

## 2025-01-24 PROCEDURE — 97535 SELF CARE MNGMENT TRAINING: CPT

## 2025-01-24 PROCEDURE — 84443 ASSAY THYROID STIM HORMONE: CPT

## 2025-01-24 PROCEDURE — 1200000000 HC SEMI PRIVATE

## 2025-01-24 PROCEDURE — 94761 N-INVAS EAR/PLS OXIMETRY MLT: CPT

## 2025-01-24 PROCEDURE — 2500000003 HC RX 250 WO HCPCS: Performed by: STUDENT IN AN ORGANIZED HEALTH CARE EDUCATION/TRAINING PROGRAM

## 2025-01-24 PROCEDURE — 2580000003 HC RX 258: Performed by: STUDENT IN AN ORGANIZED HEALTH CARE EDUCATION/TRAINING PROGRAM

## 2025-01-24 PROCEDURE — 85025 COMPLETE CBC W/AUTO DIFF WBC: CPT

## 2025-01-24 PROCEDURE — 93010 ELECTROCARDIOGRAM REPORT: CPT | Performed by: INTERNAL MEDICINE

## 2025-01-24 PROCEDURE — 2580000003 HC RX 258: Performed by: INTERNAL MEDICINE

## 2025-01-24 PROCEDURE — 6360000002 HC RX W HCPCS: Performed by: STUDENT IN AN ORGANIZED HEALTH CARE EDUCATION/TRAINING PROGRAM

## 2025-01-24 PROCEDURE — 97166 OT EVAL MOD COMPLEX 45 MIN: CPT

## 2025-01-24 RX ORDER — OXYCODONE HYDROCHLORIDE 10 MG/1
10 TABLET ORAL EVERY 6 HOURS PRN
Status: DISCONTINUED | OUTPATIENT
Start: 2025-01-24 | End: 2025-01-28 | Stop reason: HOSPADM

## 2025-01-24 RX ORDER — AMLODIPINE BESYLATE 5 MG/1
5 TABLET ORAL DAILY
Status: DISCONTINUED | OUTPATIENT
Start: 2025-01-24 | End: 2025-01-28

## 2025-01-24 RX ORDER — SODIUM CHLORIDE 9 MG/ML
INJECTION, SOLUTION INTRAVENOUS CONTINUOUS
Status: DISPENSED | OUTPATIENT
Start: 2025-01-24 | End: 2025-01-25

## 2025-01-24 RX ORDER — ACETAMINOPHEN 325 MG/1
325 TABLET ORAL EVERY 6 HOURS PRN
Status: DISCONTINUED | OUTPATIENT
Start: 2025-01-24 | End: 2025-01-28 | Stop reason: HOSPADM

## 2025-01-24 RX ADMIN — BACLOFEN 10 MG: 10 TABLET ORAL at 14:56

## 2025-01-24 RX ADMIN — MELATONIN TAB 3 MG 3 MG: 3 TAB at 21:42

## 2025-01-24 RX ADMIN — SODIUM CHLORIDE, POTASSIUM CHLORIDE, SODIUM LACTATE AND CALCIUM CHLORIDE: 600; 310; 30; 20 INJECTION, SOLUTION INTRAVENOUS at 00:55

## 2025-01-24 RX ADMIN — PANTOPRAZOLE SODIUM 20 MG: 20 TABLET, DELAYED RELEASE ORAL at 07:00

## 2025-01-24 RX ADMIN — APIXABAN 5 MG: 5 TABLET, FILM COATED ORAL at 21:42

## 2025-01-24 RX ADMIN — PREGABALIN 75 MG: 75 CAPSULE ORAL at 21:42

## 2025-01-24 RX ADMIN — SENNOSIDES AND DOCUSATE SODIUM 2 TABLET: 50; 8.6 TABLET ORAL at 00:44

## 2025-01-24 RX ADMIN — HYDRALAZINE HYDROCHLORIDE 25 MG: 25 TABLET ORAL at 09:13

## 2025-01-24 RX ADMIN — ASPIRIN 81 MG: 81 TABLET, CHEWABLE ORAL at 09:13

## 2025-01-24 RX ADMIN — ATORVASTATIN CALCIUM 20 MG: 10 TABLET, FILM COATED ORAL at 21:42

## 2025-01-24 RX ADMIN — SENNOSIDES AND DOCUSATE SODIUM 2 TABLET: 50; 8.6 TABLET ORAL at 21:42

## 2025-01-24 RX ADMIN — DOCUSATE SODIUM 100 MG: 100 CAPSULE, LIQUID FILLED ORAL at 00:44

## 2025-01-24 RX ADMIN — SERTRALINE HYDROCHLORIDE 100 MG: 50 TABLET ORAL at 09:12

## 2025-01-24 RX ADMIN — SODIUM CHLORIDE, PRESERVATIVE FREE 10 ML: 5 INJECTION INTRAVENOUS at 00:54

## 2025-01-24 RX ADMIN — DULOXETINE HYDROCHLORIDE 60 MG: 30 CAPSULE, DELAYED RELEASE ORAL at 09:12

## 2025-01-24 RX ADMIN — DOCUSATE SODIUM 100 MG: 100 CAPSULE, LIQUID FILLED ORAL at 21:42

## 2025-01-24 RX ADMIN — OXYCODONE HYDROCHLORIDE 10 MG: 10 TABLET ORAL at 09:13

## 2025-01-24 RX ADMIN — PREGABALIN 75 MG: 75 CAPSULE ORAL at 00:44

## 2025-01-24 RX ADMIN — SODIUM CHLORIDE, PRESERVATIVE FREE 10 ML: 5 INJECTION INTRAVENOUS at 09:12

## 2025-01-24 RX ADMIN — OXYCODONE HYDROCHLORIDE 10 MG: 10 TABLET ORAL at 00:44

## 2025-01-24 RX ADMIN — HYDRALAZINE HYDROCHLORIDE 25 MG: 25 TABLET ORAL at 14:56

## 2025-01-24 RX ADMIN — APIXABAN 5 MG: 5 TABLET, FILM COATED ORAL at 00:44

## 2025-01-24 RX ADMIN — BACLOFEN 10 MG: 10 TABLET ORAL at 21:42

## 2025-01-24 RX ADMIN — ATORVASTATIN CALCIUM 20 MG: 10 TABLET, FILM COATED ORAL at 00:44

## 2025-01-24 RX ADMIN — HYDRALAZINE HYDROCHLORIDE 25 MG: 25 TABLET ORAL at 21:42

## 2025-01-24 RX ADMIN — AMLODIPINE BESYLATE 5 MG: 5 TABLET ORAL at 01:30

## 2025-01-24 RX ADMIN — POLYETHYLENE GLYCOL (3350) 17 G: 17 POWDER, FOR SOLUTION ORAL at 00:48

## 2025-01-24 RX ADMIN — CARBIDOPA AND LEVODOPA 1 TABLET: 25; 250 TABLET ORAL at 21:41

## 2025-01-24 RX ADMIN — MELATONIN TAB 3 MG 3 MG: 3 TAB at 00:45

## 2025-01-24 RX ADMIN — SODIUM CHLORIDE 50 ML/HR: 9 INJECTION, SOLUTION INTRAVENOUS at 22:00

## 2025-01-24 RX ADMIN — PREGABALIN 75 MG: 75 CAPSULE ORAL at 09:12

## 2025-01-24 RX ADMIN — BACLOFEN 10 MG: 10 TABLET ORAL at 09:13

## 2025-01-24 RX ADMIN — APIXABAN 5 MG: 5 TABLET, FILM COATED ORAL at 09:12

## 2025-01-24 RX ADMIN — WATER 1000 MG: 1 INJECTION INTRAMUSCULAR; INTRAVENOUS; SUBCUTANEOUS at 00:45

## 2025-01-24 RX ADMIN — FOLIC ACID 1 MG: 1 TABLET ORAL at 09:13

## 2025-01-24 RX ADMIN — CARBIDOPA AND LEVODOPA 1 TABLET: 25; 250 TABLET ORAL at 09:13

## 2025-01-24 ASSESSMENT — PAIN DESCRIPTION - LOCATION
LOCATION: BACK

## 2025-01-24 ASSESSMENT — PAIN DESCRIPTION - ONSET
ONSET: ON-GOING
ONSET: ON-GOING

## 2025-01-24 ASSESSMENT — PAIN DESCRIPTION - PAIN TYPE
TYPE: CHRONIC PAIN
TYPE: CHRONIC PAIN

## 2025-01-24 ASSESSMENT — PAIN DESCRIPTION - DESCRIPTORS
DESCRIPTORS: ACHING

## 2025-01-24 ASSESSMENT — PAIN DESCRIPTION - FREQUENCY
FREQUENCY: CONTINUOUS
FREQUENCY: CONTINUOUS

## 2025-01-24 ASSESSMENT — PAIN DESCRIPTION - ORIENTATION
ORIENTATION: LOWER

## 2025-01-24 ASSESSMENT — PAIN SCALES - GENERAL
PAINLEVEL_OUTOF10: 7
PAINLEVEL_OUTOF10: 7
PAINLEVEL_OUTOF10: 6
PAINLEVEL_OUTOF10: 4

## 2025-01-24 NOTE — PLAN OF CARE
Problem: Chronic Conditions and Co-morbidities  Goal: Patient's chronic conditions and co-morbidity symptoms are monitored and maintained or improved  Outcome: Progressing     Problem: Discharge Planning  Goal: Discharge to home or other facility with appropriate resources  Outcome: Progressing     Problem: Pain  Goal: Verbalizes/displays adequate comfort level or baseline comfort level  Outcome: Progressing     Problem: Skin/Tissue Integrity  Goal: Skin integrity remains intact  Description: 1.  Monitor for areas of redness and/or skin breakdown  2.  Assess vascular access sites hourly  3.  Every 4-6 hours minimum:  Change oxygen saturation probe site  4.  Every 4-6 hours:  If on nasal continuous positive airway pressure, respiratory therapy assess nares and determine need for appliance change or resting period  Outcome: Progressing     Problem: ABCDS Injury Assessment  Goal: Absence of physical injury  Outcome: Progressing     Problem: Safety - Adult  Goal: Free from fall injury  Outcome: Progressing

## 2025-01-24 NOTE — ED NOTES
ED TO INPATIENT SBAR HANDOFF    Patient Name: Nerissa Brewster   :  1950  74 y.o.   Preferred Name  Nerissa  Family/Caregiver Present no   Restraints no   C-SSRS: Risk of Suicide: No Risk  Sitter no   Sepsis Risk Score        Situation  Chief Complaint   Patient presents with    Fatigue     Brief Description of Patient's Condition: 74 y.o. female that presents with increased fatigue.  Patient is concerned that she might be anemic again as she has exact same symptoms she had on recent mission earlier this month when she was found to have a hemoglobin of 4.7.  Patient denies any overt bleeding.  Patient reports lightheadedness and generalized weakness.  Patient actually fell 1 week ago because of this.  No fall signs.  Patient is supposed to follow-up with GI tomorrow.     Patient actually had EGD while hospitalized earlier this month with she did demonstrate Candida esophagitis.  Patient also received iron infusions and 2 units of blood.  Hemoglobin stabilized.  Patient is on Eliquis restarted following her last discharge.  Mental Status: alert  Arrived from: nursing home    Imaging:   XR CHEST PORTABLE   Final Result      CT HEAD WO CONTRAST   Final Result        Abnormal labs:   Abnormal Labs Reviewed   COMPREHENSIVE METABOLIC PANEL - Abnormal; Notable for the following components:       Result Value    Potassium 5.7 (*)     BUN 37 (*)     Creatinine 1.9 (*)     Est, Glom Filt Rate 26 (*)     ALT 7 (*)     All other components within normal limits   CBC WITH AUTO DIFFERENTIAL - Abnormal; Notable for the following components:    RBC 3.72 (*)     Hemoglobin 8.9 (*)     Hematocrit 30.5 (*)     MCH 23.9 (*)     MCHC 29.2 (*)     RDW 30.6 (*)     Lymphocytes % 19 (*)     Monocytes % 7 (*)     Eosinophils % 19 (*)     All other components within normal limits   TROPONIN - Abnormal; Notable for the following components:    Troponin, High Sensitivity 16 (*)     All other components within normal limits   TROPONIN -

## 2025-01-24 NOTE — CARE COORDINATION
Discharge planning initiated. From Ronal RODRIGUEZ. Patient states she has all needed DME at home. Patient has PCP and insurance. Pt's son provides transportation to all appointments. AL staff helps with ADLs as needed. Patient plans to return to AL at discharge. Denies any needs at this time. CM will continue to follow for any new needs that may arise.       01/24/25 0510   Service Assessment   Patient Orientation Alert and Oriented   Cognition Alert   History Provided By Patient;Child/Family;Medical Record   Primary Caregiver Other (Comment)  (Ronal RODRIGUEZ)   Support Systems Children   Patient's Healthcare Decision Maker is: Legal Next of Kin   PCP Verified by CM Yes   Prior Functional Level Assistance with the following:;Bathing   Current Functional Level Assistance with the following:;Bathing;Toileting;Mobility   Can patient return to prior living arrangement Yes   Ability to make needs known: Good   Family able to assist with home care needs: Other (comment)   Would you like for me to discuss the discharge plan with any other family members/significant others, and if so, who? Yes   Financial Resources Medicaid;Medicare   Community Resources Assisted Living

## 2025-01-24 NOTE — H&P
Vaping status: Never Used   Substance and Sexual Activity    Alcohol use: No    Drug use: No     Social Determinants of Health     Food Insecurity: No Food Insecurity (1/10/2025)    Hunger Vital Sign     Worried About Running Out of Food in the Last Year: Never true     Ran Out of Food in the Last Year: Never true   Transportation Needs: No Transportation Needs (1/10/2025)    PRAPARE - Transportation     Lack of Transportation (Medical): No     Lack of Transportation (Non-Medical): No    Received from [facility]    Family and Community Support    Received from [facility]    Abuse Screen   Housing Stability: Low Risk  (1/10/2025)    Housing Stability Vital Sign     Unable to Pay for Housing in the Last Year: No     Number of Times Moved in the Last Year: 0     Homeless in the Last Year: No       Medications Prior to Admission     Current Outpatient Medications   Medication Instructions    baclofen (LIORESAL) 10 mg, Oral, 3 TIMES DAILY    carbidopa-levodopa (SINEMET)  MG per tablet 2 tablets, Oral, DAILY    docusate sodium (COLACE) 100 mg, Oral, 2 TIMES DAILY    DULoxetine (CYMBALTA) 30 mg, Oral, DAILY    Eliquis 5 mg, Oral, 2 TIMES DAILY    folic acid (FOLVITE) 1 mg, Oral, DAILY    hydrALAZINE (APRESOLINE) 25 mg, Oral, 3 TIMES DAILY    magnesium hydroxide (MILK OF MAGNESIA) 400 MG/5ML suspension 30 mLs, Oral, DAILY PRN    Mucus Relief 600 mg, Oral, 2 TIMES DAILY    nitroGLYCERIN (NITRODUR) 0.4 MG/HR 1 patch, TransDERmal, DAILY    oxyCODONE-acetaminophen (PERCOCET)  MG per tablet 1 tablet, Oral, 4 TIMES DAILY    pantoprazole (PROTONIX) 20 mg, Oral, DAILY    polyethylene glycol (MIRALAX) 17 g, Oral, 2 TIMES DAILY    potassium chloride (MICRO-K) 10 MEQ extended release capsule 10 mEq, Oral, 2 TIMES DAILY    pregabalin (LYRICA) 75 mg, Oral, 2 TIMES DAILY    sertraline (ZOLOFT) 50 mg, Oral, DAILY    traZODone (DESYREL) 50 mg, Oral, NIGHTLY       Medications:     Medications:

## 2025-01-25 LAB
AMMONIA PLAS-SCNC: 27 UMOL/L (ref 11–51)
ANION GAP SERPL CALCULATED.3IONS-SCNC: 8 MMOL/L (ref 9–17)
ARTERIAL PATENCY WRIST A: ABNORMAL
BASOPHILS # BLD: 0.07 K/UL
BASOPHILS NFR BLD: 1 % (ref 0–1)
BODY TEMPERATURE: 37
BUN SERPL-MCNC: 26 MG/DL (ref 7–20)
CALCIUM SERPL-MCNC: 9.2 MG/DL (ref 8.3–10.6)
CHLORIDE SERPL-SCNC: 110 MMOL/L (ref 99–110)
CO2 SERPL-SCNC: 24 MMOL/L (ref 21–32)
COHGB MFR BLD: 1 % (ref 0.5–1.5)
CREAT SERPL-MCNC: 1.2 MG/DL (ref 0.6–1.2)
EOSINOPHIL # BLD: 1.07 K/UL
EOSINOPHILS RELATIVE PERCENT: 11 % (ref 0–3)
ERYTHROCYTE [DISTWIDTH] IN BLOOD BY AUTOMATED COUNT: 31.6 % (ref 11.7–14.9)
FOLATE SERPL-MCNC: 34 NG/ML (ref 4.8–24.2)
GFR, ESTIMATED: 46 ML/MIN/1.73M2
GLUCOSE BLD-MCNC: 106 MG/DL (ref 74–99)
GLUCOSE BLD-MCNC: 174 MG/DL (ref 74–99)
GLUCOSE BLD-MCNC: 80 MG/DL (ref 74–99)
GLUCOSE BLD-MCNC: 96 MG/DL (ref 74–99)
GLUCOSE SERPL-MCNC: 108 MG/DL (ref 74–99)
HCO3 VENOUS: 24.5 MMOL/L (ref 22–29)
HCT VFR BLD AUTO: 30.4 % (ref 37–47)
HGB BLD-MCNC: 8.9 G/DL (ref 12.5–16)
IMM GRANULOCYTES # BLD AUTO: 0.04 K/UL
IMM GRANULOCYTES NFR BLD: 0 %
LYMPHOCYTES NFR BLD: 1.45 K/UL
LYMPHOCYTES RELATIVE PERCENT: 14 % (ref 24–44)
MCH RBC QN AUTO: 23.7 PG (ref 27–31)
MCHC RBC AUTO-ENTMCNC: 29.3 G/DL (ref 32–36)
MCV RBC AUTO: 81.1 FL (ref 78–100)
METHEMOGLOBIN: 0.3 % (ref 0.5–1.5)
MONOCYTES NFR BLD: 0.47 K/UL
MONOCYTES NFR BLD: 5 % (ref 0–4)
NEGATIVE BASE EXCESS, VEN: 0.7 MMOL/L (ref 0–3)
NEUTROPHILS NFR BLD: 69 % (ref 36–66)
NEUTS SEG NFR BLD: 6.95 K/UL
OXYHGB MFR BLD: 76 %
PCO2 VENOUS: 42.2 MM HG (ref 38–54)
PH VENOUS: 7.38 (ref 7.32–7.43)
PLATELET, FLUORESCENCE: 156 K/UL (ref 140–440)
PO2 VENOUS: 43.5 MM HG (ref 23–48)
POTASSIUM SERPL-SCNC: 5.2 MMOL/L (ref 3.5–5.1)
RBC # BLD AUTO: 3.75 M/UL (ref 4.2–5.4)
SODIUM SERPL-SCNC: 143 MMOL/L (ref 136–145)
TSH SERPL DL<=0.05 MIU/L-ACNC: 1.15 UIU/ML (ref 0.27–4.2)
VIT B12 SERPL-MCNC: 364 PG/ML (ref 211–911)
WBC OTHER # BLD: 10.1 K/UL (ref 4–10.5)

## 2025-01-25 PROCEDURE — 82805 BLOOD GASES W/O2 SATURATION: CPT

## 2025-01-25 PROCEDURE — 85025 COMPLETE CBC W/AUTO DIFF WBC: CPT

## 2025-01-25 PROCEDURE — 82962 GLUCOSE BLOOD TEST: CPT

## 2025-01-25 PROCEDURE — 84443 ASSAY THYROID STIM HORMONE: CPT

## 2025-01-25 PROCEDURE — 82607 VITAMIN B-12: CPT

## 2025-01-25 PROCEDURE — 97162 PT EVAL MOD COMPLEX 30 MIN: CPT

## 2025-01-25 PROCEDURE — 6370000000 HC RX 637 (ALT 250 FOR IP): Performed by: INTERNAL MEDICINE

## 2025-01-25 PROCEDURE — 6370000000 HC RX 637 (ALT 250 FOR IP): Performed by: STUDENT IN AN ORGANIZED HEALTH CARE EDUCATION/TRAINING PROGRAM

## 2025-01-25 PROCEDURE — 94761 N-INVAS EAR/PLS OXIMETRY MLT: CPT

## 2025-01-25 PROCEDURE — 82140 ASSAY OF AMMONIA: CPT

## 2025-01-25 PROCEDURE — 80048 BASIC METABOLIC PNL TOTAL CA: CPT

## 2025-01-25 PROCEDURE — 6360000002 HC RX W HCPCS: Performed by: STUDENT IN AN ORGANIZED HEALTH CARE EDUCATION/TRAINING PROGRAM

## 2025-01-25 PROCEDURE — 82746 ASSAY OF FOLIC ACID SERUM: CPT

## 2025-01-25 PROCEDURE — 97530 THERAPEUTIC ACTIVITIES: CPT

## 2025-01-25 PROCEDURE — 36415 COLL VENOUS BLD VENIPUNCTURE: CPT

## 2025-01-25 PROCEDURE — 1200000000 HC SEMI PRIVATE

## 2025-01-25 PROCEDURE — 2500000003 HC RX 250 WO HCPCS: Performed by: STUDENT IN AN ORGANIZED HEALTH CARE EDUCATION/TRAINING PROGRAM

## 2025-01-25 RX ORDER — AMLODIPINE BESYLATE 5 MG/1
5 TABLET ORAL ONCE
Status: COMPLETED | OUTPATIENT
Start: 2025-01-25 | End: 2025-01-25

## 2025-01-25 RX ADMIN — WATER 1000 MG: 1 INJECTION INTRAMUSCULAR; INTRAVENOUS; SUBCUTANEOUS at 00:12

## 2025-01-25 RX ADMIN — DOCUSATE SODIUM 100 MG: 100 CAPSULE, LIQUID FILLED ORAL at 09:39

## 2025-01-25 RX ADMIN — ASPIRIN 81 MG: 81 TABLET, CHEWABLE ORAL at 09:39

## 2025-01-25 RX ADMIN — BACLOFEN 10 MG: 10 TABLET ORAL at 09:39

## 2025-01-25 RX ADMIN — HYDRALAZINE HYDROCHLORIDE 25 MG: 25 TABLET ORAL at 20:28

## 2025-01-25 RX ADMIN — OXYCODONE HYDROCHLORIDE 10 MG: 10 TABLET ORAL at 03:49

## 2025-01-25 RX ADMIN — BACLOFEN 10 MG: 10 TABLET ORAL at 20:28

## 2025-01-25 RX ADMIN — CARBIDOPA AND LEVODOPA 1 TABLET: 25; 250 TABLET ORAL at 20:27

## 2025-01-25 RX ADMIN — SENNOSIDES AND DOCUSATE SODIUM 2 TABLET: 50; 8.6 TABLET ORAL at 09:38

## 2025-01-25 RX ADMIN — CARBIDOPA AND LEVODOPA 1 TABLET: 25; 250 TABLET ORAL at 09:37

## 2025-01-25 RX ADMIN — SODIUM CHLORIDE, PRESERVATIVE FREE 10 ML: 5 INJECTION INTRAVENOUS at 20:28

## 2025-01-25 RX ADMIN — HYDRALAZINE HYDROCHLORIDE 25 MG: 25 TABLET ORAL at 13:08

## 2025-01-25 RX ADMIN — AMLODIPINE BESYLATE 5 MG: 5 TABLET ORAL at 13:08

## 2025-01-25 RX ADMIN — HYDRALAZINE HYDROCHLORIDE 25 MG: 25 TABLET ORAL at 09:38

## 2025-01-25 RX ADMIN — ATORVASTATIN CALCIUM 20 MG: 10 TABLET, FILM COATED ORAL at 20:27

## 2025-01-25 RX ADMIN — AMLODIPINE BESYLATE 5 MG: 5 TABLET ORAL at 09:39

## 2025-01-25 RX ADMIN — FOLIC ACID 1 MG: 1 TABLET ORAL at 09:39

## 2025-01-25 RX ADMIN — SERTRALINE HYDROCHLORIDE 100 MG: 50 TABLET ORAL at 09:38

## 2025-01-25 RX ADMIN — APIXABAN 5 MG: 5 TABLET, FILM COATED ORAL at 20:27

## 2025-01-25 RX ADMIN — BACLOFEN 10 MG: 10 TABLET ORAL at 13:08

## 2025-01-25 RX ADMIN — SODIUM CHLORIDE, PRESERVATIVE FREE 10 ML: 5 INJECTION INTRAVENOUS at 09:38

## 2025-01-25 RX ADMIN — APIXABAN 5 MG: 5 TABLET, FILM COATED ORAL at 09:39

## 2025-01-25 RX ADMIN — OXYCODONE HYDROCHLORIDE 10 MG: 10 TABLET ORAL at 20:35

## 2025-01-25 RX ADMIN — DULOXETINE HYDROCHLORIDE 60 MG: 30 CAPSULE, DELAYED RELEASE ORAL at 09:38

## 2025-01-25 RX ADMIN — PREGABALIN 75 MG: 75 CAPSULE ORAL at 09:38

## 2025-01-25 RX ADMIN — PREGABALIN 75 MG: 75 CAPSULE ORAL at 20:27

## 2025-01-25 ASSESSMENT — PAIN SCALES - GENERAL
PAINLEVEL_OUTOF10: 5
PAINLEVEL_OUTOF10: 7
PAINLEVEL_OUTOF10: 5
PAINLEVEL_OUTOF10: 7
PAINLEVEL_OUTOF10: 3
PAINLEVEL_OUTOF10: 7

## 2025-01-25 ASSESSMENT — PAIN DESCRIPTION - LOCATION
LOCATION: BACK

## 2025-01-25 ASSESSMENT — PAIN DESCRIPTION - PAIN TYPE
TYPE: CHRONIC PAIN

## 2025-01-25 ASSESSMENT — PAIN SCALES - WONG BAKER: WONGBAKER_NUMERICALRESPONSE: NO HURT

## 2025-01-25 ASSESSMENT — PAIN DESCRIPTION - DESCRIPTORS
DESCRIPTORS: ACHING
DESCRIPTORS: ACHING
DESCRIPTORS: DISCOMFORT;ACHING

## 2025-01-25 ASSESSMENT — PAIN DESCRIPTION - ORIENTATION
ORIENTATION: LOWER
ORIENTATION: LOWER

## 2025-01-25 ASSESSMENT — PAIN DESCRIPTION - FREQUENCY
FREQUENCY: CONTINUOUS
FREQUENCY: CONTINUOUS

## 2025-01-25 ASSESSMENT — PAIN DESCRIPTION - ONSET
ONSET: ON-GOING
ONSET: ON-GOING

## 2025-01-25 ASSESSMENT — PAIN - FUNCTIONAL ASSESSMENT
PAIN_FUNCTIONAL_ASSESSMENT: PREVENTS OR INTERFERES SOME ACTIVE ACTIVITIES AND ADLS
PAIN_FUNCTIONAL_ASSESSMENT: PREVENTS OR INTERFERES SOME ACTIVE ACTIVITIES AND ADLS

## 2025-01-25 NOTE — PLAN OF CARE
Problem: Chronic Conditions and Co-morbidities  Goal: Patient's chronic conditions and co-morbidity symptoms are monitored and maintained or improved  Outcome: Progressing     Problem: Pain  Goal: Verbalizes/displays adequate comfort level or baseline comfort level  Outcome: Progressing     Problem: Skin/Tissue Integrity  Goal: Skin integrity remains intact  Description: 1.  Monitor for areas of redness and/or skin breakdown  2.  Assess vascular access sites hourly  3.  Every 4-6 hours minimum:  Change oxygen saturation probe site  4.  Every 4-6 hours:  If on nasal continuous positive airway pressure, respiratory therapy assess nares and determine need for appliance change or resting period  Outcome: Progressing

## 2025-01-25 NOTE — CONSULTS
[facility] ACUTE CARE PHYSICAL THERAPY EVALUATION  Nerissa Brewster, 1950, 1113/1113-A, 1/25/2025    History  [place]:  The primary encounter diagnosis was TRINO (acute kidney injury) (HCC). A diagnosis of Hyperkalemia was also pertinent to this visit.  Patient  has a past medical history of A-fib (HCC), ACS (acute coronary syndrome) (HCC), TRINO (acute kidney injury) (HCC), Anxiety disorder, Arthritis, Atrial fibrillation (HCC), Blood transfusion, CAD (coronary artery disease), Cerebral artery occlusion with cerebral infarction (HCC), CHF (congestive heart failure) (HCC), Coronary syndrome, acute (HCC), Depression, Diabetes mellitus (HCC), Dysphagia, Fall, Hyperlipidemia, Hypertension, Lumbar disc herniation, Nausea & vomiting, Parkinson's disease (HCC), and Reflux.  Patient  has a past surgical history that includes Coronary angioplasty with stent; Appendectomy (1964); Breast surgery (1995); Cholecystectomy (1986); Colonoscopy (2012); Hysterectomy (1994); Tonsillectomy (1955); other surgical history (2010); Colonoscopy (N/A, 1/11/2025); and Upper gastrointestinal endoscopy (N/A, 1/11/2025).    Discharge Recommendation: Facility for moderate post-acute rehabilitation, anticipate 1-2 hours per day and 5 days per week.     Equipment: TBD at next level of care    Subjective:    Patient states:  \"I don't feel great today.\"      Pain:  denies pain.      Communication with other providers:  Handoff to RN    Restrictions: general precautions, fall risk    Home Setup/Prior level of function  Social/Functional History  Lives With: Alone  Type of Home: Assisted living  Home Layout: One level  Home Access: Level entry  Bathroom Shower/Tub: Walk-in shower  Bathroom Toilet: Standard  Bathroom Equipment: Grab bars in shower, Shower chair  Bathroom Accessibility: Accessible  Home Equipment: Wheelchair - Manual, Walker - Rolling  Has the patient had two or more falls in the past year or any fall with injury in the past

## 2025-01-26 LAB
ANION GAP SERPL CALCULATED.3IONS-SCNC: 10 MMOL/L (ref 9–17)
BASOPHILS # BLD: 0.07 K/UL
BASOPHILS NFR BLD: 1 % (ref 0–1)
BUN SERPL-MCNC: 18 MG/DL (ref 7–20)
CALCIUM SERPL-MCNC: 9.4 MG/DL (ref 8.3–10.6)
CHLORIDE SERPL-SCNC: 110 MMOL/L (ref 99–110)
CO2 SERPL-SCNC: 22 MMOL/L (ref 21–32)
CREAT SERPL-MCNC: 0.8 MG/DL (ref 0.6–1.2)
EOSINOPHIL # BLD: 0.67 K/UL
EOSINOPHILS RELATIVE PERCENT: 7 % (ref 0–3)
ERYTHROCYTE [DISTWIDTH] IN BLOOD BY AUTOMATED COUNT: 31.7 % (ref 11.7–14.9)
GFR, ESTIMATED: 68 ML/MIN/1.73M2
GLUCOSE BLD-MCNC: 134 MG/DL (ref 74–99)
GLUCOSE BLD-MCNC: 88 MG/DL (ref 74–99)
GLUCOSE BLD-MCNC: 88 MG/DL (ref 74–99)
GLUCOSE BLD-MCNC: 96 MG/DL (ref 74–99)
GLUCOSE SERPL-MCNC: 87 MG/DL (ref 74–99)
HCT VFR BLD AUTO: 31.8 % (ref 37–47)
HGB BLD-MCNC: 9.3 G/DL (ref 12.5–16)
IMM GRANULOCYTES # BLD AUTO: 0.05 K/UL
IMM GRANULOCYTES NFR BLD: 1 %
LYMPHOCYTES NFR BLD: 2.05 K/UL
LYMPHOCYTES RELATIVE PERCENT: 22 % (ref 24–44)
MAGNESIUM SERPL-MCNC: 2 MG/DL (ref 1.8–2.4)
MCH RBC QN AUTO: 23.5 PG (ref 27–31)
MCHC RBC AUTO-ENTMCNC: 29.2 G/DL (ref 32–36)
MCV RBC AUTO: 80.3 FL (ref 78–100)
MONOCYTES NFR BLD: 0.63 K/UL
MONOCYTES NFR BLD: 7 % (ref 0–4)
NEUTROPHILS NFR BLD: 64 % (ref 36–66)
NEUTS SEG NFR BLD: 6.07 K/UL
PLATELET, FLUORESCENCE: 164 K/UL (ref 140–440)
POTASSIUM SERPL-SCNC: 4.1 MMOL/L (ref 3.5–5.1)
RBC # BLD AUTO: 3.96 M/UL (ref 4.2–5.4)
SODIUM SERPL-SCNC: 142 MMOL/L (ref 136–145)
WBC OTHER # BLD: 9.5 K/UL (ref 4–10.5)

## 2025-01-26 PROCEDURE — 6370000000 HC RX 637 (ALT 250 FOR IP): Performed by: STUDENT IN AN ORGANIZED HEALTH CARE EDUCATION/TRAINING PROGRAM

## 2025-01-26 PROCEDURE — 2500000003 HC RX 250 WO HCPCS: Performed by: STUDENT IN AN ORGANIZED HEALTH CARE EDUCATION/TRAINING PROGRAM

## 2025-01-26 PROCEDURE — 94761 N-INVAS EAR/PLS OXIMETRY MLT: CPT

## 2025-01-26 PROCEDURE — 6360000002 HC RX W HCPCS: Performed by: STUDENT IN AN ORGANIZED HEALTH CARE EDUCATION/TRAINING PROGRAM

## 2025-01-26 PROCEDURE — 1200000000 HC SEMI PRIVATE

## 2025-01-26 RX ADMIN — DOCUSATE SODIUM 100 MG: 100 CAPSULE, LIQUID FILLED ORAL at 20:40

## 2025-01-26 RX ADMIN — CARBIDOPA AND LEVODOPA 1 TABLET: 25; 250 TABLET ORAL at 10:25

## 2025-01-26 RX ADMIN — APIXABAN 5 MG: 5 TABLET, FILM COATED ORAL at 20:39

## 2025-01-26 RX ADMIN — HYDRALAZINE HYDROCHLORIDE 25 MG: 25 TABLET ORAL at 07:46

## 2025-01-26 RX ADMIN — CARBIDOPA AND LEVODOPA 1 TABLET: 25; 250 TABLET ORAL at 22:30

## 2025-01-26 RX ADMIN — PREGABALIN 75 MG: 75 CAPSULE ORAL at 20:39

## 2025-01-26 RX ADMIN — DOCUSATE SODIUM 100 MG: 100 CAPSULE, LIQUID FILLED ORAL at 07:45

## 2025-01-26 RX ADMIN — POLYETHYLENE GLYCOL (3350) 17 G: 17 POWDER, FOR SOLUTION ORAL at 07:44

## 2025-01-26 RX ADMIN — OXYCODONE HYDROCHLORIDE 10 MG: 10 TABLET ORAL at 13:52

## 2025-01-26 RX ADMIN — AMLODIPINE BESYLATE 5 MG: 5 TABLET ORAL at 07:47

## 2025-01-26 RX ADMIN — SERTRALINE HYDROCHLORIDE 100 MG: 50 TABLET ORAL at 07:46

## 2025-01-26 RX ADMIN — BACLOFEN 10 MG: 10 TABLET ORAL at 13:52

## 2025-01-26 RX ADMIN — PREGABALIN 75 MG: 75 CAPSULE ORAL at 07:46

## 2025-01-26 RX ADMIN — OXYCODONE HYDROCHLORIDE 10 MG: 10 TABLET ORAL at 07:02

## 2025-01-26 RX ADMIN — SENNOSIDES AND DOCUSATE SODIUM 2 TABLET: 50; 8.6 TABLET ORAL at 07:47

## 2025-01-26 RX ADMIN — HYDRALAZINE HYDROCHLORIDE 25 MG: 25 TABLET ORAL at 20:40

## 2025-01-26 RX ADMIN — BACLOFEN 10 MG: 10 TABLET ORAL at 07:45

## 2025-01-26 RX ADMIN — WATER 1000 MG: 1 INJECTION INTRAMUSCULAR; INTRAVENOUS; SUBCUTANEOUS at 00:57

## 2025-01-26 RX ADMIN — SODIUM CHLORIDE, PRESERVATIVE FREE 10 ML: 5 INJECTION INTRAVENOUS at 00:58

## 2025-01-26 RX ADMIN — SODIUM CHLORIDE, PRESERVATIVE FREE 10 ML: 5 INJECTION INTRAVENOUS at 20:40

## 2025-01-26 RX ADMIN — POLYETHYLENE GLYCOL (3350) 17 G: 17 POWDER, FOR SOLUTION ORAL at 20:40

## 2025-01-26 RX ADMIN — SODIUM CHLORIDE, PRESERVATIVE FREE 10 ML: 5 INJECTION INTRAVENOUS at 07:46

## 2025-01-26 RX ADMIN — BACLOFEN 10 MG: 10 TABLET ORAL at 20:39

## 2025-01-26 RX ADMIN — ATORVASTATIN CALCIUM 20 MG: 10 TABLET, FILM COATED ORAL at 20:39

## 2025-01-26 RX ADMIN — HYDRALAZINE HYDROCHLORIDE 25 MG: 25 TABLET ORAL at 13:52

## 2025-01-26 RX ADMIN — MELATONIN TAB 3 MG 3 MG: 3 TAB at 20:39

## 2025-01-26 RX ADMIN — FOLIC ACID 1 MG: 1 TABLET ORAL at 07:45

## 2025-01-26 RX ADMIN — PANTOPRAZOLE SODIUM 20 MG: 20 TABLET, DELAYED RELEASE ORAL at 05:42

## 2025-01-26 RX ADMIN — OXYCODONE HYDROCHLORIDE 10 MG: 10 TABLET ORAL at 20:39

## 2025-01-26 RX ADMIN — SENNOSIDES AND DOCUSATE SODIUM 2 TABLET: 50; 8.6 TABLET ORAL at 20:39

## 2025-01-26 RX ADMIN — ASPIRIN 81 MG: 81 TABLET, CHEWABLE ORAL at 07:47

## 2025-01-26 RX ADMIN — APIXABAN 5 MG: 5 TABLET, FILM COATED ORAL at 07:44

## 2025-01-26 RX ADMIN — DULOXETINE HYDROCHLORIDE 60 MG: 30 CAPSULE, DELAYED RELEASE ORAL at 07:45

## 2025-01-26 ASSESSMENT — PAIN DESCRIPTION - ONSET
ONSET: ON-GOING

## 2025-01-26 ASSESSMENT — PAIN - FUNCTIONAL ASSESSMENT
PAIN_FUNCTIONAL_ASSESSMENT: PREVENTS OR INTERFERES SOME ACTIVE ACTIVITIES AND ADLS
PAIN_FUNCTIONAL_ASSESSMENT: PREVENTS OR INTERFERES SOME ACTIVE ACTIVITIES AND ADLS
PAIN_FUNCTIONAL_ASSESSMENT: ACTIVITIES ARE NOT PREVENTED

## 2025-01-26 ASSESSMENT — PAIN DESCRIPTION - PAIN TYPE
TYPE: CHRONIC PAIN

## 2025-01-26 ASSESSMENT — PAIN DESCRIPTION - DESCRIPTORS
DESCRIPTORS: ACHING
DESCRIPTORS: ACHING;SHARP
DESCRIPTORS: THROBBING
DESCRIPTORS: ACHING;SHARP

## 2025-01-26 ASSESSMENT — PAIN SCALES - GENERAL
PAINLEVEL_OUTOF10: 8
PAINLEVEL_OUTOF10: 6
PAINLEVEL_OUTOF10: 8
PAINLEVEL_OUTOF10: 7
PAINLEVEL_OUTOF10: 7

## 2025-01-26 ASSESSMENT — PAIN DESCRIPTION - ORIENTATION
ORIENTATION: LOWER

## 2025-01-26 ASSESSMENT — PAIN DESCRIPTION - FREQUENCY
FREQUENCY: CONTINUOUS
FREQUENCY: INTERMITTENT

## 2025-01-26 ASSESSMENT — PAIN SCALES - WONG BAKER: WONGBAKER_NUMERICALRESPONSE: HURTS WHOLE LOT

## 2025-01-26 ASSESSMENT — PAIN DESCRIPTION - LOCATION
LOCATION: BACK

## 2025-01-26 NOTE — PLAN OF CARE
Problem: Skin/Tissue Integrity  Goal: Skin integrity remains intact  Description: 1.  Monitor for areas of redness and/or skin breakdown  2.  Assess vascular access sites hourly  3.  Every 4-6 hours minimum:  Change oxygen saturation probe site  4.  Every 4-6 hours:  If on nasal continuous positive airway pressure, respiratory therapy assess nares and determine need for appliance change or resting period  Outcome: Progressing     Problem: Safety - Adult  Goal: Free from fall injury  Outcome: Progressing     Problem: Pain  Goal: Verbalizes/displays adequate comfort level or baseline comfort level  Outcome: Progressing

## 2025-01-27 LAB
ANION GAP SERPL CALCULATED.3IONS-SCNC: 12 MMOL/L (ref 9–17)
BASOPHILS # BLD: 0.11 K/UL
BASOPHILS NFR BLD: 1 % (ref 0–1)
BUN SERPL-MCNC: 17 MG/DL (ref 7–20)
CALCIUM SERPL-MCNC: 9.9 MG/DL (ref 8.3–10.6)
CHLORIDE SERPL-SCNC: 110 MMOL/L (ref 99–110)
CO2 SERPL-SCNC: 22 MMOL/L (ref 21–32)
CREAT SERPL-MCNC: 0.8 MG/DL (ref 0.6–1.2)
EOSINOPHIL # BLD: 0.85 K/UL
EOSINOPHILS RELATIVE PERCENT: 8 % (ref 0–3)
ERYTHROCYTE [DISTWIDTH] IN BLOOD BY AUTOMATED COUNT: 31.9 % (ref 11.7–14.9)
GFR, ESTIMATED: 68 ML/MIN/1.73M2
GLUCOSE BLD-MCNC: 102 MG/DL (ref 74–99)
GLUCOSE BLD-MCNC: 98 MG/DL (ref 74–99)
GLUCOSE SERPL-MCNC: 100 MG/DL (ref 74–99)
HCT VFR BLD AUTO: 34.4 % (ref 37–47)
HGB BLD-MCNC: 10 G/DL (ref 12.5–16)
IMM GRANULOCYTES # BLD AUTO: 0.05 K/UL
IMM GRANULOCYTES NFR BLD: 0 %
LYMPHOCYTES NFR BLD: 2.34 K/UL
LYMPHOCYTES RELATIVE PERCENT: 21 % (ref 24–44)
MCH RBC QN AUTO: 23.7 PG (ref 27–31)
MCHC RBC AUTO-ENTMCNC: 29.1 G/DL (ref 32–36)
MCV RBC AUTO: 81.5 FL (ref 78–100)
MONOCYTES NFR BLD: 0.8 K/UL
MONOCYTES NFR BLD: 7 % (ref 0–4)
NEUTROPHILS NFR BLD: 63 % (ref 36–66)
NEUTS SEG NFR BLD: 7.06 K/UL
PLATELET, FLUORESCENCE: 184 K/UL (ref 140–440)
POTASSIUM SERPL-SCNC: 3.9 MMOL/L (ref 3.5–5.1)
RBC # BLD AUTO: 4.22 M/UL (ref 4.2–5.4)
SODIUM SERPL-SCNC: 145 MMOL/L (ref 136–145)
WBC OTHER # BLD: 11.2 K/UL (ref 4–10.5)

## 2025-01-27 PROCEDURE — 2500000003 HC RX 250 WO HCPCS: Performed by: STUDENT IN AN ORGANIZED HEALTH CARE EDUCATION/TRAINING PROGRAM

## 2025-01-27 PROCEDURE — 6370000000 HC RX 637 (ALT 250 FOR IP): Performed by: STUDENT IN AN ORGANIZED HEALTH CARE EDUCATION/TRAINING PROGRAM

## 2025-01-27 PROCEDURE — 82962 GLUCOSE BLOOD TEST: CPT

## 2025-01-27 PROCEDURE — 6360000002 HC RX W HCPCS: Performed by: STUDENT IN AN ORGANIZED HEALTH CARE EDUCATION/TRAINING PROGRAM

## 2025-01-27 PROCEDURE — 97116 GAIT TRAINING THERAPY: CPT

## 2025-01-27 PROCEDURE — 97530 THERAPEUTIC ACTIVITIES: CPT

## 2025-01-27 PROCEDURE — 80048 BASIC METABOLIC PNL TOTAL CA: CPT

## 2025-01-27 PROCEDURE — 85025 COMPLETE CBC W/AUTO DIFF WBC: CPT

## 2025-01-27 PROCEDURE — 36415 COLL VENOUS BLD VENIPUNCTURE: CPT

## 2025-01-27 PROCEDURE — 94761 N-INVAS EAR/PLS OXIMETRY MLT: CPT

## 2025-01-27 PROCEDURE — 87086 URINE CULTURE/COLONY COUNT: CPT

## 2025-01-27 PROCEDURE — 1200000000 HC SEMI PRIVATE

## 2025-01-27 PROCEDURE — 6360000002 HC RX W HCPCS: Performed by: NURSE PRACTITIONER

## 2025-01-27 RX ORDER — HYDRALAZINE HYDROCHLORIDE 20 MG/ML
10 INJECTION INTRAMUSCULAR; INTRAVENOUS EVERY 6 HOURS PRN
Status: DISCONTINUED | OUTPATIENT
Start: 2025-01-27 | End: 2025-01-28 | Stop reason: HOSPADM

## 2025-01-27 RX ADMIN — WATER 1000 MG: 1 INJECTION INTRAMUSCULAR; INTRAVENOUS; SUBCUTANEOUS at 01:02

## 2025-01-27 RX ADMIN — ATORVASTATIN CALCIUM 20 MG: 10 TABLET, FILM COATED ORAL at 20:23

## 2025-01-27 RX ADMIN — POLYETHYLENE GLYCOL (3350) 17 G: 17 POWDER, FOR SOLUTION ORAL at 08:33

## 2025-01-27 RX ADMIN — SENNOSIDES AND DOCUSATE SODIUM 2 TABLET: 50; 8.6 TABLET ORAL at 08:34

## 2025-01-27 RX ADMIN — OXYCODONE HYDROCHLORIDE 10 MG: 10 TABLET ORAL at 18:55

## 2025-01-27 RX ADMIN — SODIUM CHLORIDE, PRESERVATIVE FREE 10 ML: 5 INJECTION INTRAVENOUS at 20:24

## 2025-01-27 RX ADMIN — OXYCODONE HYDROCHLORIDE 10 MG: 10 TABLET ORAL at 02:31

## 2025-01-27 RX ADMIN — SERTRALINE HYDROCHLORIDE 100 MG: 50 TABLET ORAL at 08:34

## 2025-01-27 RX ADMIN — FOLIC ACID 1 MG: 1 TABLET ORAL at 08:34

## 2025-01-27 RX ADMIN — BACLOFEN 10 MG: 10 TABLET ORAL at 14:43

## 2025-01-27 RX ADMIN — ASPIRIN 81 MG: 81 TABLET, CHEWABLE ORAL at 08:34

## 2025-01-27 RX ADMIN — PANTOPRAZOLE SODIUM 20 MG: 20 TABLET, DELAYED RELEASE ORAL at 05:23

## 2025-01-27 RX ADMIN — HYDRALAZINE HYDROCHLORIDE 10 MG: 20 INJECTION INTRAMUSCULAR; INTRAVENOUS at 02:50

## 2025-01-27 RX ADMIN — DULOXETINE HYDROCHLORIDE 60 MG: 30 CAPSULE, DELAYED RELEASE ORAL at 08:34

## 2025-01-27 RX ADMIN — SODIUM CHLORIDE, PRESERVATIVE FREE 10 ML: 5 INJECTION INTRAVENOUS at 08:34

## 2025-01-27 RX ADMIN — BACLOFEN 10 MG: 10 TABLET ORAL at 08:34

## 2025-01-27 RX ADMIN — DOCUSATE SODIUM 100 MG: 100 CAPSULE, LIQUID FILLED ORAL at 08:34

## 2025-01-27 RX ADMIN — HYDRALAZINE HYDROCHLORIDE 25 MG: 25 TABLET ORAL at 20:23

## 2025-01-27 RX ADMIN — AMLODIPINE BESYLATE 5 MG: 5 TABLET ORAL at 08:34

## 2025-01-27 RX ADMIN — APIXABAN 5 MG: 5 TABLET, FILM COATED ORAL at 08:34

## 2025-01-27 RX ADMIN — CARBIDOPA AND LEVODOPA 1 TABLET: 25; 250 TABLET ORAL at 08:34

## 2025-01-27 RX ADMIN — OXYCODONE HYDROCHLORIDE 10 MG: 10 TABLET ORAL at 08:39

## 2025-01-27 RX ADMIN — HYDRALAZINE HYDROCHLORIDE 25 MG: 25 TABLET ORAL at 14:43

## 2025-01-27 RX ADMIN — PREGABALIN 75 MG: 75 CAPSULE ORAL at 08:34

## 2025-01-27 RX ADMIN — PREGABALIN 75 MG: 75 CAPSULE ORAL at 20:23

## 2025-01-27 RX ADMIN — BACLOFEN 10 MG: 10 TABLET ORAL at 20:23

## 2025-01-27 RX ADMIN — CARBIDOPA AND LEVODOPA 1 TABLET: 25; 250 TABLET ORAL at 20:23

## 2025-01-27 RX ADMIN — APIXABAN 5 MG: 5 TABLET, FILM COATED ORAL at 20:23

## 2025-01-27 RX ADMIN — HYDRALAZINE HYDROCHLORIDE 25 MG: 25 TABLET ORAL at 08:34

## 2025-01-27 ASSESSMENT — PAIN SCALES - GENERAL
PAINLEVEL_OUTOF10: 5
PAINLEVEL_OUTOF10: 10
PAINLEVEL_OUTOF10: 4
PAINLEVEL_OUTOF10: 4
PAINLEVEL_OUTOF10: 5
PAINLEVEL_OUTOF10: 7
PAINLEVEL_OUTOF10: 5

## 2025-01-27 ASSESSMENT — PAIN DESCRIPTION - ORIENTATION
ORIENTATION: LOWER
ORIENTATION: LOWER
ORIENTATION: RIGHT;LEFT
ORIENTATION: LOWER

## 2025-01-27 ASSESSMENT — PAIN DESCRIPTION - DESCRIPTORS
DESCRIPTORS: ACHING
DESCRIPTORS: ACHING
DESCRIPTORS: ACHING;DISCOMFORT
DESCRIPTORS: ACHING

## 2025-01-27 ASSESSMENT — PAIN DESCRIPTION - LOCATION
LOCATION: BACK
LOCATION: LEG;BACK
LOCATION: BACK

## 2025-01-27 ASSESSMENT — PAIN DESCRIPTION - PAIN TYPE: TYPE: CHRONIC PAIN

## 2025-01-27 ASSESSMENT — PAIN - FUNCTIONAL ASSESSMENT: PAIN_FUNCTIONAL_ASSESSMENT: PREVENTS OR INTERFERES SOME ACTIVE ACTIVITIES AND ADLS

## 2025-01-27 NOTE — PLAN OF CARE
Problem: Chronic Conditions and Co-morbidities  Goal: Patient's chronic conditions and co-morbidity symptoms are monitored and maintained or improved  1/26/2025 2305 by Pearl Cunningham RN  Outcome: Progressing  1/26/2025 1340 by Massiel Page LPN  Outcome: Progressing     Problem: Discharge Planning  Goal: Discharge to home or other facility with appropriate resources  1/26/2025 2305 by Pearl Cunningham RN  Outcome: Progressing  1/26/2025 1340 by Massiel Page LPN  Outcome: Progressing     Problem: Pain  Goal: Verbalizes/displays adequate comfort level or baseline comfort level  1/26/2025 2305 by Pearl Cunningham RN  Outcome: Progressing  1/26/2025 1340 by Massiel Page LPN  Outcome: Progressing     Problem: Skin/Tissue Integrity  Goal: Skin integrity remains intact  Description: 1.  Monitor for areas of redness and/or skin breakdown  2.  Assess vascular access sites hourly  3.  Every 4-6 hours minimum:  Change oxygen saturation probe site  4.  Every 4-6 hours:  If on nasal continuous positive airway pressure, respiratory therapy assess nares and determine need for appliance change or resting period  1/26/2025 2305 by Pearl Cunningham RN  Outcome: Progressing  1/26/2025 1340 by Massiel Page LPN  Outcome: Progressing     Problem: ABCDS Injury Assessment  Goal: Absence of physical injury  1/26/2025 2305 by Pearl Cunningham RN  Outcome: Progressing  1/26/2025 1340 by Massiel Page LPN  Outcome: Progressing     Problem: Safety - Adult  Goal: Free from fall injury  1/26/2025 2305 by Pearl Cunningham RN  Outcome: Progressing  1/26/2025 1340 by Massiel Page LPN  Outcome: Progressing

## 2025-01-27 NOTE — PLAN OF CARE
Problem: Pain  Goal: Verbalizes/displays adequate comfort level or baseline comfort level  1/27/2025 1117 by Massiel Page LPN  Outcome: Progressing  1/26/2025 2305 by Pearl Cunningham, RN  Outcome: Progressing     Problem: Skin/Tissue Integrity  Goal: Skin integrity remains intact  Description: 1.  Monitor for areas of redness and/or skin breakdown  2.  Assess vascular access sites hourly  3.  Every 4-6 hours minimum:  Change oxygen saturation probe site  4.  Every 4-6 hours:  If on nasal continuous positive airway pressure, respiratory therapy assess nares and determine need for appliance change or resting period  1/27/2025 1117 by Massiel Page LPN  Outcome: Progressing  1/26/2025 2305 by Pearl Cunningham, RN  Outcome: Progressing     Problem: ABCDS Injury Assessment  Goal: Absence of physical injury  1/27/2025 1117 by Massiel Page LPN  Outcome: Progressing  1/26/2025 2305 by Pearl Cunningham, RN  Outcome: Progressing

## 2025-01-28 VITALS
HEART RATE: 81 BPM | DIASTOLIC BLOOD PRESSURE: 61 MMHG | WEIGHT: 197.6 LBS | OXYGEN SATURATION: 98 % | TEMPERATURE: 98.4 F | BODY MASS INDEX: 29.27 KG/M2 | RESPIRATION RATE: 18 BRPM | SYSTOLIC BLOOD PRESSURE: 174 MMHG | HEIGHT: 69 IN

## 2025-01-28 LAB
ANION GAP SERPL CALCULATED.3IONS-SCNC: 12 MMOL/L (ref 9–17)
BASOPHILS # BLD: 0.14 K/UL
BASOPHILS NFR BLD: 1 % (ref 0–1)
BUN SERPL-MCNC: 20 MG/DL (ref 7–20)
CALCIUM SERPL-MCNC: 9.7 MG/DL (ref 8.3–10.6)
CHLORIDE SERPL-SCNC: 110 MMOL/L (ref 99–110)
CO2 SERPL-SCNC: 22 MMOL/L (ref 21–32)
CREAT SERPL-MCNC: 0.9 MG/DL (ref 0.6–1.2)
EOSINOPHIL # BLD: 0.59 K/UL
EOSINOPHILS RELATIVE PERCENT: 5 % (ref 0–3)
ERYTHROCYTE [DISTWIDTH] IN BLOOD BY AUTOMATED COUNT: 31.4 % (ref 11.7–14.9)
GFR, ESTIMATED: 60 ML/MIN/1.73M2
GLUCOSE BLD-MCNC: 103 MG/DL (ref 74–99)
GLUCOSE BLD-MCNC: 77 MG/DL (ref 74–99)
GLUCOSE BLD-MCNC: 91 MG/DL (ref 74–99)
GLUCOSE SERPL-MCNC: 90 MG/DL (ref 74–99)
HCT VFR BLD AUTO: 30.8 % (ref 37–47)
HGB BLD-MCNC: 9.4 G/DL (ref 12.5–16)
IMM GRANULOCYTES # BLD AUTO: 0.06 K/UL
IMM GRANULOCYTES NFR BLD: 1 %
LYMPHOCYTES NFR BLD: 2.17 K/UL
LYMPHOCYTES RELATIVE PERCENT: 19 % (ref 24–44)
MCH RBC QN AUTO: 24.3 PG (ref 27–31)
MCHC RBC AUTO-ENTMCNC: 30.5 G/DL (ref 32–36)
MCV RBC AUTO: 79.6 FL (ref 78–100)
MICROORGANISM SPEC CULT: NORMAL
MONOCYTES NFR BLD: 0.91 K/UL
MONOCYTES NFR BLD: 8 % (ref 0–4)
NEUTROPHILS NFR BLD: 65 % (ref 36–66)
NEUTS SEG NFR BLD: 7.31 K/UL
PLATELET, FLUORESCENCE: 163 K/UL (ref 140–440)
POTASSIUM SERPL-SCNC: 3.6 MMOL/L (ref 3.5–5.1)
RBC # BLD AUTO: 3.87 M/UL (ref 4.2–5.4)
SERVICE CMNT-IMP: NORMAL
SODIUM SERPL-SCNC: 144 MMOL/L (ref 136–145)
SPECIMEN DESCRIPTION: NORMAL
WBC OTHER # BLD: 11.2 K/UL (ref 4–10.5)

## 2025-01-28 PROCEDURE — 97535 SELF CARE MNGMENT TRAINING: CPT

## 2025-01-28 PROCEDURE — 6360000002 HC RX W HCPCS: Performed by: STUDENT IN AN ORGANIZED HEALTH CARE EDUCATION/TRAINING PROGRAM

## 2025-01-28 PROCEDURE — 6370000000 HC RX 637 (ALT 250 FOR IP): Performed by: STUDENT IN AN ORGANIZED HEALTH CARE EDUCATION/TRAINING PROGRAM

## 2025-01-28 PROCEDURE — 82962 GLUCOSE BLOOD TEST: CPT

## 2025-01-28 PROCEDURE — 85025 COMPLETE CBC W/AUTO DIFF WBC: CPT

## 2025-01-28 PROCEDURE — 80048 BASIC METABOLIC PNL TOTAL CA: CPT

## 2025-01-28 PROCEDURE — 97110 THERAPEUTIC EXERCISES: CPT

## 2025-01-28 PROCEDURE — 36415 COLL VENOUS BLD VENIPUNCTURE: CPT

## 2025-01-28 PROCEDURE — 2500000003 HC RX 250 WO HCPCS: Performed by: STUDENT IN AN ORGANIZED HEALTH CARE EDUCATION/TRAINING PROGRAM

## 2025-01-28 PROCEDURE — 94761 N-INVAS EAR/PLS OXIMETRY MLT: CPT

## 2025-01-28 PROCEDURE — 97530 THERAPEUTIC ACTIVITIES: CPT

## 2025-01-28 RX ORDER — ASPIRIN 81 MG/1
81 TABLET ORAL DAILY
COMMUNITY

## 2025-01-28 RX ORDER — ATORVASTATIN CALCIUM 20 MG/1
20 TABLET, FILM COATED ORAL NIGHTLY
Qty: 30 TABLET | Refills: 3 | Status: SHIPPED | OUTPATIENT
Start: 2025-01-28

## 2025-01-28 RX ORDER — AMLODIPINE BESYLATE 5 MG/1
5 TABLET ORAL DAILY
Qty: 30 TABLET | Refills: 0 | Status: SHIPPED | OUTPATIENT
Start: 2025-01-29 | End: 2025-02-28

## 2025-01-28 RX ORDER — LORATADINE 10 MG/1
10 CAPSULE, LIQUID FILLED ORAL DAILY PRN
Qty: 30 CAPSULE | Refills: 0
Start: 2025-01-28

## 2025-01-28 RX ORDER — CLONAZEPAM 0.5 MG/1
0.5 TABLET ORAL 2 TIMES DAILY
Status: ON HOLD | COMMUNITY
End: 2025-01-28

## 2025-01-28 RX ORDER — BENZOCAINE/MENTHOL 6 MG-10 MG
LOZENGE MUCOUS MEMBRANE 3 TIMES DAILY PRN
COMMUNITY

## 2025-01-28 RX ORDER — ASPIRIN 81 MG/1
81 TABLET, CHEWABLE ORAL DAILY
Qty: 30 TABLET | Refills: 3 | Status: SHIPPED | OUTPATIENT
Start: 2025-01-29

## 2025-01-28 RX ORDER — LOSARTAN POTASSIUM 50 MG/1
50 TABLET ORAL DAILY
Qty: 90 TABLET | Refills: 1
Start: 2025-01-28 | End: 2025-01-28 | Stop reason: HOSPADM

## 2025-01-28 RX ORDER — OXYBUTYNIN CHLORIDE 5 MG/1
5 TABLET, EXTENDED RELEASE ORAL NIGHTLY PRN
Qty: 30 TABLET | Refills: 3
Start: 2025-01-28

## 2025-01-28 RX ORDER — OXYCODONE AND ACETAMINOPHEN 5; 325 MG/1; MG/1
1 TABLET ORAL EVERY 6 HOURS PRN
Qty: 12 TABLET | Refills: 0 | Status: SHIPPED | OUTPATIENT
Start: 2025-01-28 | End: 2025-01-31

## 2025-01-28 RX ORDER — LOSARTAN POTASSIUM 100 MG/1
100 TABLET ORAL DAILY
COMMUNITY

## 2025-01-28 RX ORDER — MELOXICAM 15 MG/1
15 TABLET ORAL DAILY
Status: ON HOLD | COMMUNITY
End: 2025-01-28 | Stop reason: HOSPADM

## 2025-01-28 RX ORDER — PREGABALIN 50 MG/1
50 CAPSULE ORAL 2 TIMES DAILY
Qty: 28 CAPSULE | Refills: 0 | Status: SHIPPED
Start: 2025-01-28 | End: 2025-02-11

## 2025-01-28 RX ORDER — DIPHENHYDRAMINE HCL 25 MG
1 CAPSULE ORAL 3 TIMES DAILY
COMMUNITY

## 2025-01-28 RX ORDER — LOSARTAN POTASSIUM 100 MG/1
100 TABLET ORAL DAILY
Qty: 30 TABLET | Refills: 3
Start: 2025-01-28

## 2025-01-28 RX ORDER — PAROXETINE 10 MG/1
10 TABLET, FILM COATED ORAL
Status: ON HOLD | COMMUNITY
End: 2025-01-28

## 2025-01-28 RX ORDER — LOSARTAN POTASSIUM 25 MG/1
50 TABLET ORAL DAILY
Status: DISCONTINUED | OUTPATIENT
Start: 2025-01-28 | End: 2025-01-28

## 2025-01-28 RX ORDER — TRIAMCINOLONE ACETONIDE 55 UG/1
2 SPRAY, METERED NASAL DAILY
COMMUNITY

## 2025-01-28 RX ORDER — SENNA AND DOCUSATE SODIUM 50; 8.6 MG/1; MG/1
2 TABLET, FILM COATED ORAL 2 TIMES DAILY
COMMUNITY

## 2025-01-28 RX ORDER — CLONAZEPAM 0.5 MG/1
0.5 TABLET ORAL 2 TIMES DAILY PRN
Qty: 1 TABLET | Refills: 0 | Status: SHIPPED
Start: 2025-01-28 | End: 2025-02-27

## 2025-01-28 RX ORDER — HYDRALAZINE HYDROCHLORIDE 25 MG/1
50 TABLET, FILM COATED ORAL 3 TIMES DAILY
Qty: 90 TABLET | Refills: 3 | Status: SHIPPED | OUTPATIENT
Start: 2025-01-28

## 2025-01-28 RX ORDER — IPRATROPIUM BROMIDE AND ALBUTEROL SULFATE 2.5; .5 MG/3ML; MG/3ML
1 SOLUTION RESPIRATORY (INHALATION) 2 TIMES DAILY
COMMUNITY

## 2025-01-28 RX ORDER — AMLODIPINE BESYLATE 10 MG/1
10 TABLET ORAL DAILY
Status: DISCONTINUED | OUTPATIENT
Start: 2025-01-28 | End: 2025-01-28 | Stop reason: HOSPADM

## 2025-01-28 RX ORDER — LOSARTAN POTASSIUM 100 MG/1
100 TABLET ORAL DAILY
Status: DISCONTINUED | OUTPATIENT
Start: 2025-01-28 | End: 2025-01-28 | Stop reason: HOSPADM

## 2025-01-28 RX ORDER — BUMETANIDE 1 MG/1
1 TABLET ORAL
Qty: 30 TABLET | Refills: 3 | Status: SHIPPED
Start: 2025-01-29

## 2025-01-28 RX ORDER — LORATADINE 10 MG/1
10 CAPSULE, LIQUID FILLED ORAL DAILY
Status: ON HOLD | COMMUNITY
End: 2025-01-28

## 2025-01-28 RX ORDER — OMEGA-3S/DHA/EPA/FISH OIL/D3 300MG-1000
400 CAPSULE ORAL DAILY
COMMUNITY

## 2025-01-28 RX ORDER — HYDRALAZINE HYDROCHLORIDE 50 MG/1
50 TABLET, FILM COATED ORAL 3 TIMES DAILY
Status: DISCONTINUED | OUTPATIENT
Start: 2025-01-28 | End: 2025-01-28 | Stop reason: HOSPADM

## 2025-01-28 RX ORDER — SPIRONOLACTONE 25 MG/1
25 TABLET ORAL DAILY
COMMUNITY
End: 2025-01-28 | Stop reason: HOSPADM

## 2025-01-28 RX ORDER — BUMETANIDE 1 MG/1
1 TABLET ORAL DAILY
Status: ON HOLD | COMMUNITY
End: 2025-01-28

## 2025-01-28 RX ORDER — PAROXETINE 10 MG/1
10 TABLET, FILM COATED ORAL
Qty: 30 TABLET | Refills: 3 | Status: SHIPPED | OUTPATIENT
Start: 2025-01-28

## 2025-01-28 RX ORDER — SERTRALINE HYDROCHLORIDE 100 MG/1
100 TABLET, FILM COATED ORAL DAILY
Qty: 30 TABLET | Refills: 0 | Status: SHIPPED | OUTPATIENT
Start: 2025-01-29

## 2025-01-28 RX ORDER — LOSARTAN POTASSIUM 100 MG/1
100 TABLET ORAL DAILY
Status: DISCONTINUED | OUTPATIENT
Start: 2025-01-28 | End: 2025-01-28

## 2025-01-28 RX ORDER — OXYCODONE AND ACETAMINOPHEN 5; 325 MG/1; MG/1
1 TABLET ORAL EVERY 6 HOURS PRN
Qty: 12 TABLET | Refills: 0 | Status: SHIPPED | OUTPATIENT
Start: 2025-01-28 | End: 2025-01-28

## 2025-01-28 RX ORDER — OXYBUTYNIN CHLORIDE 5 MG/1
5 TABLET, EXTENDED RELEASE ORAL
Status: ON HOLD | COMMUNITY
End: 2025-01-28

## 2025-01-28 RX ADMIN — ONDANSETRON 4 MG: 4 TABLET, ORALLY DISINTEGRATING ORAL at 08:41

## 2025-01-28 RX ADMIN — HYDRALAZINE HYDROCHLORIDE 50 MG: 50 TABLET ORAL at 16:01

## 2025-01-28 RX ADMIN — ACETAMINOPHEN 325 MG: 325 TABLET ORAL at 02:31

## 2025-01-28 RX ADMIN — AMLODIPINE BESYLATE 10 MG: 10 TABLET ORAL at 09:02

## 2025-01-28 RX ADMIN — OXYCODONE HYDROCHLORIDE 10 MG: 10 TABLET ORAL at 09:02

## 2025-01-28 RX ADMIN — BACLOFEN 10 MG: 10 TABLET ORAL at 09:02

## 2025-01-28 RX ADMIN — FOLIC ACID 1 MG: 1 TABLET ORAL at 09:02

## 2025-01-28 RX ADMIN — WATER 1000 MG: 1 INJECTION INTRAMUSCULAR; INTRAVENOUS; SUBCUTANEOUS at 00:08

## 2025-01-28 RX ADMIN — HYDRALAZINE HYDROCHLORIDE 50 MG: 50 TABLET ORAL at 09:02

## 2025-01-28 RX ADMIN — DULOXETINE HYDROCHLORIDE 60 MG: 30 CAPSULE, DELAYED RELEASE ORAL at 09:03

## 2025-01-28 RX ADMIN — POLYETHYLENE GLYCOL (3350) 17 G: 17 POWDER, FOR SOLUTION ORAL at 09:01

## 2025-01-28 RX ADMIN — ASPIRIN 81 MG: 81 TABLET, CHEWABLE ORAL at 09:03

## 2025-01-28 RX ADMIN — CARBIDOPA AND LEVODOPA 1 TABLET: 25; 250 TABLET ORAL at 09:03

## 2025-01-28 RX ADMIN — LOSARTAN POTASSIUM 100 MG: 100 TABLET, FILM COATED ORAL at 17:21

## 2025-01-28 RX ADMIN — APIXABAN 5 MG: 5 TABLET, FILM COATED ORAL at 09:02

## 2025-01-28 RX ADMIN — DOCUSATE SODIUM 100 MG: 100 CAPSULE, LIQUID FILLED ORAL at 09:01

## 2025-01-28 RX ADMIN — OXYCODONE HYDROCHLORIDE 10 MG: 10 TABLET ORAL at 02:31

## 2025-01-28 RX ADMIN — SERTRALINE HYDROCHLORIDE 100 MG: 50 TABLET ORAL at 09:02

## 2025-01-28 RX ADMIN — BACLOFEN 10 MG: 10 TABLET ORAL at 16:01

## 2025-01-28 RX ADMIN — SODIUM CHLORIDE, PRESERVATIVE FREE 10 ML: 5 INJECTION INTRAVENOUS at 09:03

## 2025-01-28 RX ADMIN — PANTOPRAZOLE SODIUM 20 MG: 20 TABLET, DELAYED RELEASE ORAL at 06:06

## 2025-01-28 RX ADMIN — SENNOSIDES AND DOCUSATE SODIUM 2 TABLET: 50; 8.6 TABLET ORAL at 09:02

## 2025-01-28 ASSESSMENT — PAIN SCALES - GENERAL
PAINLEVEL_OUTOF10: 8
PAINLEVEL_OUTOF10: 7
PAINLEVEL_OUTOF10: 6
PAINLEVEL_OUTOF10: 10
PAINLEVEL_OUTOF10: 4

## 2025-01-28 ASSESSMENT — PAIN DESCRIPTION - ORIENTATION
ORIENTATION: LOWER
ORIENTATION: LOWER

## 2025-01-28 ASSESSMENT — PAIN DESCRIPTION - LOCATION
LOCATION: BACK
LOCATION: BACK
LOCATION: BACK;COCCYX

## 2025-01-28 ASSESSMENT — PAIN DESCRIPTION - FREQUENCY: FREQUENCY: CONTINUOUS

## 2025-01-28 ASSESSMENT — PAIN DESCRIPTION - DESCRIPTORS
DESCRIPTORS: ACHING
DESCRIPTORS: ACHING;THROBBING
DESCRIPTORS: ACHING

## 2025-01-28 ASSESSMENT — PAIN DESCRIPTION - PAIN TYPE: TYPE: CHRONIC PAIN

## 2025-01-28 ASSESSMENT — PAIN SCALES - WONG BAKER: WONGBAKER_NUMERICALRESPONSE: HURTS EVEN MORE

## 2025-01-28 ASSESSMENT — PAIN - FUNCTIONAL ASSESSMENT
PAIN_FUNCTIONAL_ASSESSMENT: PREVENTS OR INTERFERES SOME ACTIVE ACTIVITIES AND ADLS
PAIN_FUNCTIONAL_ASSESSMENT: ACTIVITIES ARE NOT PREVENTED

## 2025-01-28 ASSESSMENT — PAIN DESCRIPTION - ONSET: ONSET: ON-GOING

## 2025-01-28 NOTE — PLAN OF CARE
Problem: Chronic Conditions and Co-morbidities  Goal: Patient's chronic conditions and co-morbidity symptoms are monitored and maintained or improved  1/27/2025 2315 by Hermelinda Cotto RN  Outcome: Progressing     Problem: Discharge Planning  Goal: Discharge to home or other facility with appropriate resources  1/27/2025 2315 by Hermelinda Cotto RN  Outcome: Progressing  Flowsheets (Taken 1/27/2025 2315)  Discharge to home or other facility with appropriate resources:   Identify barriers to discharge with patient and caregiver   Arrange for needed discharge resources and transportation as appropriate   Identify discharge learning needs (meds, wound care, etc)     Problem: Pain  Goal: Verbalizes/displays adequate comfort level or baseline comfort level  1/27/2025 2315 by Hermelinda Cotto RN  Flowsheets (Taken 1/27/2025 2315)  Verbalizes/displays adequate comfort level or baseline comfort level:   Encourage patient to monitor pain and request assistance   Assess pain using appropriate pain scale   Administer analgesics based on type and severity of pain and evaluate response   Implement non-pharmacological measures as appropriate and evaluate response     Problem: Skin/Tissue Integrity  Goal: Skin integrity remains intact  Description: 1.  Monitor for areas of redness and/or skin breakdown  2.  Assess vascular access sites hourly  3.  Every 4-6 hours minimum:  Change oxygen saturation probe site  4.  Every 4-6 hours:  If on nasal continuous positive airway pressure, respiratory therapy assess nares and determine need for appliance change or resting period  1/27/2025 2315 by Hermelinda oCtto, RN  Flowsheets (Taken 1/27/2025 2315)  Skin Integrity Remains Intact: Monitor for areas of redness and/or skin breakdown

## 2025-01-28 NOTE — PLAN OF CARE
Problem: Chronic Conditions and Co-morbidities  Goal: Patient's chronic conditions and co-morbidity symptoms are monitored and maintained or improved  1/28/2025 1109 by Latrell Rausch LPN  Outcome: Progressing     Problem: Discharge Planning  Goal: Discharge to home or other facility with appropriate resources  1/28/2025 1109 by Latrell Rausch LPN  Outcome: Progressing     Problem: Pain  Goal: Verbalizes/displays adequate comfort level or baseline comfort level  1/28/2025 1109 by Latrell Rausch LPN  Outcome: Progressing     Problem: Skin/Tissue Integrity  Goal: Skin integrity remains intact  Description: 1.  Monitor for areas of redness and/or skin breakdown  2.  Assess vascular access sites hourly  3.  Every 4-6 hours minimum:  Change oxygen saturation probe site  4.  Every 4-6 hours:  If on nasal continuous positive airway pressure, respiratory therapy assess nares and determine need for appliance change or resting period  1/28/2025 1109 by Latrell Rausch LPN  Outcome: Progressing     Problem: ABCDS Injury Assessment  Goal: Absence of physical injury  Outcome: Progressing     Problem: Safety - Adult  Goal: Free from fall injury  Outcome: Progressing     Problem: Safety - Adult  Goal: Free from fall injury  Outcome: Progressing

## 2025-01-28 NOTE — DISCHARGE INSTR - COC
Continuity of Care Form    Patient Name: Nerissa Brewster   :  1950  MRN:  [id number]    Admit date:  2025  Discharge date:  ***    Code Status Order: DNR-CCA   Advance Directives:   Advance Care Flowsheet Documentation             Admitting Physician:  Ever Olsen MD  PCP: Louise Barroso MD    Discharging Nurse: ***  Discharging Hospital Unit/Room#: 1113/1113-A  Discharging Unit Phone Number: ***    Emergency Contact:   Extended Emergency Contact Information  Primary Emergency Contact: Chava Hinton  Home Phone: [phone]  Relation: Child    Past Surgical History:  Past Surgical History:   Procedure Laterality Date    APPENDECTOMY      BREAST SURGERY      removal of fibrosis\"right breast\"    CHOLECYSTECTOMY      COLONOSCOPY      COLONOSCOPY N/A 2025    COLONOSCOPY DIAGNOSTIC performed by Abbie Bustos MD at [facility] ENDOSCOPY    CORONARY ANGIOPLASTY WITH STENT PLACEMENT      X 2\"last time was 4-5 yrs ago\"\"total a total of 6 heart stents\"    HYSTERECTOMY (CERVIX STATUS UNKNOWN)      \"took everything\"    OTHER SURGICAL HISTORY      reveal monitor insertion-removed in 2017    TONSILLECTOMY      UPPER GASTROINTESTINAL ENDOSCOPY N/A 2025    ESOPHAGOGASTRODUODENOSCOPY BIOPSY performed by Abbie Bustos MD at [facility] ENDOSCOPY       Immunization History:   Immunization History   Administered Date(s) Administered    COVID-19, PFIZER PURPLE top, DILUTE for use, (age 12 y+), 30mcg/0.3mL 2021, 2021    Influenza Virus Vaccine 2018    Pneumococcal Conjugate 7-valent (Prevnar7) 2014       Active Problems:  Patient Active Problem List   Diagnosis Code    Abnormal nuclear cardiac imaging test R93.1    ASHD (arteriosclerotic heart disease) I25.10    AF (atrial fibrillation) (HCC) I48.91    Obesity (BMI 30.0-34.9) E66.811    TRINO (acute kidney injury) (HCC) N17.9    Chronic anticoagulation Z79.01    Parkinson's disease (HCC) G20.A1    Coagulopathy (HCC) D68.9  Quality 130: Documentation Of Current Medications In The Medical Record: Current Medications Documented Quality 110: Preventive Care And Screening: Influenza Immunization: Influenza Immunization Administered during Influenza season Detail Level: Detailed

## 2025-01-28 NOTE — DISCHARGE INSTR - DIET

## 2025-01-29 NOTE — DISCHARGE SUMMARY
Discharge Summary    Name:  Nerissa Brewster /Age/Sex: 1950  (74 y.o. female)   MRN & CSN:  [id number] & [id number] Admission Date/Time: 2025  5:33 PM  Discharge Date/Time: 2025  7:50 PM   Attending:  No att. providers found Discharging Physician: Yordy De Souza MD     Hospital Course:   Nerissa Brewster is a 74 y.o.  female  who presents with TRINO (acute kidney injury) (HCC)    HPI  \"Patient is a 74 y.o. female with a PMHx as above who presented to the ED with increased lethargy and decreased oral intake at NH, labs there showed TRINO, thus sent to ED. Hx otherwise is limited as patient unable to provide significant details. Son at bedside unable to provide significant details as well besides what is mentioned above. \"       The following problems have been addressed during this hospitalization.    TRINO  with Mild hyperkalemia  - Reported increased lethargy and decreased oral intake at NH, labs there showed TRINO, thus sent to ED.  - Clinically hypovolemic on admission. Cr 1.9 on arrival, baseline ~ 0.9. UA with mild proteineuria.  - Held losartan, Aldactone and Bumex on admission  Cr was 0.9 on discharge day  BP meds changed as below   Recommend pt to see nephrology outpatient  Monitor BMP weekly      Acute cystitis with hematuria  - Unable to reliably deny or confirm symptoms per admitting team.  - UA with 241 WBC's   -Completed 5 days of Rocephin   urine cx no growth to date    Acute metabolic  encephalopathy  -Has been having a decreased level of alertness this admission resolved on day of discharge  -Etiology likely a buildup of Lyrica in the setting of an TRINO  -Lyrica dose decreased  - recommend tapering down Klonopin     Essential HTN  On home Losartan 100 mg daily, Aldactone 25 mg , hydralazine 25 mg TID in addition to Bumex 1 mg daily  Would avoid giving both Losartan and Aldactone  given recurrent admission for TRINO w hyperkalemia  - discharged on losartan 100 mg, hydralazine dose increased to 50 mg

## 2025-01-29 NOTE — PROGRESS NOTES
V2.0    [facility] [place]      Name:  Nerissa Brewster /Age/Sex: 1950  (74 y.o. female)   MRN & CSN:  [id number] & [id number] Encounter Date/Time: 2025 6:12 PM EST   Location:  [address] [place] [address] PCP: Louise Barroso MD     Attending:Jm Correa MD       Hospital Day: 5    Assessment and Recommendations   Patient is a 74 y.o. female who presented with fatigue.       -Called the lab  -The urine culture is lost in the system  -Requested another culture  -Completed 5 days of Rocephin     TRINO, mild  - Reported increased lethargy and decreased oral intake at NH, labs there showed TRINO, thus sent to ED.  - Clinically hypovolemic on admission. Cr 1.9 on arrival, baseline ~ 0.9. UA with mild proteineuria.  - Held losartan, Aldactone and Bumex and upon discharge can consider resuming both but resume Bumex at 0.5 mg daily instead of 1 mg daily, and I will call the assisted living facility and have them check the electrolytes weekly x 4  - Hydrated    Mild hyperkalemia  -ARB, spironolactone and potassium held upon admission     Acute cystitis with hematuria  - Unable to reliably deny or confirm symptoms per admitting team.  - UA with 241 WBC's   -Completed 5 days of Rocephin    Toxic encephalopathy  -Has been having a decreased level of alertness this admission  -Etiology likely a buildup of Lyrica in the setting of an TRINO  -Toxic encephalopathy slowly improving daily    Parkinson's disease per chart  - Continue Sinemet.-She only takes it twice a day     Depression  - Continue Zoloft and Cymbalta.    On Lyrica  -Dose is apparently 150 mg 3 times a day per PDMP report and while inpatient receiving 75 mg twice daily    On baclofen  -Verified by looking at the dispense report    On Sinemet  mg  -Takes it twice a day    Chronic pain syndrome  -On oxycodone 10/325 mg up to 4 times a day    On NSAIDs-meloxicam daily-will DC meloxicam due to the TRINO    Depression  -On Cymbalta  -On paroxetine  -On 
    V2.0    [facility] [place] Note      Name:  Nerissa Brewster /Age/Sex: 1950  (74 y.o. female)   MRN & CSN:  [id number] & [id number] Encounter Date/Time: 2025 6:12 PM EST   Location:  [address] [place] [address]A PCP: Louise Barroso MD     Attending:Jm Correa MD       Hospital Day: 3    Assessment and Recommendations   Patient is a 74 y.o. female who presented with fatigue.       -Son visited today and indicates that she is still nodding off today  -Continue Rocephin and follow-up urine culture      -Patient reports that when she wakes up she feels like she is going to fall right back asleep, and she is unsure why  -Reviewed the medication list  -She is on opiates and Lyrica as well as baclofen and Sinemet  -Will send labs in the morning requested venous blood gas in the morning  -Urine culture is pending  -Renal function has improved  -Call placed to daughter Sandra, with unable to reach her today     # TRINO, mild  - Reported increased lethargy and decreased oral intake at NH, labs there showed TRINO, thus sent to ED.  - Clinically hypovolemic. Cr 1.9, baseline ~ 0.9. UA mild proteineuria.  - Held losartan, Aldactone and Bumex. Will challenge with IVF. Strict I/O's. Bladder scan if decreased voiding.     # Acute cystitis with hematuria  - Unable to reliably deny or confirm symptoms. No recent UCx.  - UA positive for infection.  - Started on Rocephin, follow-up cultures.     # Hyperkalemia  - Mild, aggressively treated in ED.   - Hold losartan, Aldactone and potassium supplements pending repeat labs.     # CAD s/p PCI to mLAD, RCA and OM2 in 2019  # Non-MI troponin elevation  - Initial Tn mildly elevated, repeat flat. ECG without acute ischemic changes.  - Likely secondary to above. Continue ASA and Lipitor.      # HFpEF, compensated  # AS s/p TAVR in 2023  - Last TTE in 2021 with LVEF of 55-60%.   - Clinically hypovolemic.   - Hold Bumex for now.       # Paroxysmal atrial 
  Occupational Therapy Treatment Note    Name: Nerissa Brewster MRN: [id number] :   1950   Date:  2025   Admission Date: 2025 Room:  [address] [place] [address]     Primary Problem:  Hyperkalemia     Restrictions/Precautions:          General Precautions, Fall Risk, IV, Telemetry     Communication with other providers: Per chart review and Nurse patient is appropriate for therapeutic intervention.     Subjective:  Patient states:  Agreeable to OT. \"I would like to go to the bathroom.\"  Pain:   deny    Objective:    Observation: In chair upon arrival   Objective Measures:  Alert and oriented    Treatment, including education:  Therapeutic Activity Training:   Therapeutic activity training was instructed today.  Cues were given for safety, sequence, UE/LE placement, awareness, and balance.    Bed mobility:n/a  Sitting balance:F+ for dyn  Sit/stand transfers:CGA from chair and SBA utilizing 3 and 1 over the toilet  Functional Mobility: CGA around the room<>bathroom with slow fanny with kyphotic posture utilizing standard walker with verbal cues for hand placement and keeping walker in safe distance due to pt having a tendency placing the walker to far out in front of her.     Activities performed today included bed mobility training, transfers, functional mobility  to increase strength, activity tolerance to facilitate IND c ADL tasks, func transfers / mobility with G safety awareness carryover      Self Care Training:   Cues were given for safety, sequence, UE/LE placement, visual cues, and balance.  Activities performed today included dressing, toileting, personal hygiene, and grooming task. Demo Min A for clothng mgmt with hike undergarments over hips and CGA while standing for toilet hygiene/ personal hygiene. SBA with personal hygiene while resting sayda forearms on countertop to maintain balance       Therapeutic Exercise:  BUE strengthening ex targeting utilizing red theraband shoulder press, chest press, 
  Physician Progress Note      PATIENT:               KEVIN GLEZ  [facility] #:                  [id number]  :                       1950  ADMIT DATE:       2025 5:33 PM  DISCH DATE:  RESPONDING  PROVIDER #:        Yordy De Souza MD          QUERY TEXT:    Patient with atrial fibrillation and is maintained on Eliquis. If possible,   please document in progress notes and discharge summary if you are evaluating   and/or treating any of the following:?  ?  The medical record reflects the following:  Risk Factors: htn, gender, age, CHF  Clinical Indicators: afib  Treatment: Eliquis  Options provided:  -- Secondary hypercoagulable state in a patient with atrial fibrillation  -- Other - I will add my own diagnosis  -- Disagree - Not applicable / Not valid  -- Disagree - Clinically unable to determine / Unknown  -- Refer to Clinical Documentation Reviewer    PROVIDER RESPONSE TEXT:    This patient has secondary hypercoagulable state in a patient with atrial   fibrillation.    Query created by: Jennifer Pena on 2025 1:13 PM      Electronically signed by:  Yordy De Souza MD 2025 1:18 PM          
4 Eyes Skin Assessment     NAME:  Nerissa Brewster  YOB: 1950  MEDICAL RECORD NUMBER:  [id number]    The patient is being assessed for  Admission    I agree that at least one RN has performed a thorough Head to Toe Skin Assessment on the patient. ALL assessment sites listed below have been assessed.      Areas assessed by both nurses:    Head, Face, Ears, Shoulders, Back, Chest, Arms, Elbows, Hands, Sacrum. Buttock, Coccyx, Ischium, and Legs. Feet and Heels        Does the Patient have a Wound? No noted wound(s)       Andriy Prevention initiated by RN: No  Wound Care Orders initiated by RN: No    Pressure Injury (Stage 3,4, Unstageable, DTI, NWPT, and Complex wounds) if present, place Wound referral order by RN under : No    New Ostomies, if present place, Ostomy referral order under : No     Nurse 1 eSignature: Electronically signed by Francine Guallpa RN on 1/24/25 at 5:19 AM EST    **SHARE this note so that the co-signing nurse can place an eSignature**    Nurse 2 eSignature: Electronically signed by Shira Pickens LPN on 1/24/25 at 7:08 AM EST    
Hospitalist-Meds reconciled    Will call her cardiologist at [facility] and inform them of the falls at the assisted living facility.    Additionally, will call the facility and asked them to check weekly electrolytes x 4  
Occupational Therapy  [facility] ACUTE CARE OCCUPATIONAL THERAPY EVALUATION    Nerissa Brewster, 1950, 1113/1113-A, 1/24/2025    Discharge Recommendation: Encourage minimal to moderate OT services at discharge, typically 1-2 hours/day, 5 days/week.     History:  [place]:  The primary encounter diagnosis was TRINO (acute kidney injury) (HCC). A diagnosis of Hyperkalemia was also pertinent to this visit.    Subjective:  Patient states: \"I like the activity programs at my assisted living facility\"  Pain: Pt reported 6/10 pain (chronic pain in lower back)  Communication with other providers: HÉCTOR Box  Restrictions: General Precautions, Fall Risk, IV, Telemetry    Home Setup/Prior level of function:  Social/Functional History  Lives With: Alone  Type of Home: Assisted living  Home Layout: One level  Home Access: Level entry  Bathroom Shower/Tub: Walk-in shower  Bathroom Toilet: Standard  Bathroom Equipment: Grab bars in shower, Shower chair  Bathroom Accessibility: Accessible  Home Equipment: Wheelchair - Manual, Walker - Rolling  Has the patient had two or more falls in the past year or any fall with injury in the past year?: Yes  Prior Level of Assist for ADLs: Needs assistance (showers independently, requires assistance for dressing)  Prior Level of Assist for Homemaking: Needs assistance  Homemaking Responsibilities: No  Prior Level of Assist for Ambulation: Independent household ambulator, Independent community ambulator (uses wheelchair and RW)  Prior Level of Assist for Transfers: Independent (able to stand pivot transfer to wheelchair)  Active : No  Occupation: Retired    Examination:  Observation: Seated in chair upon arrival. Agreeable to evaluation.   Vision: WFL  Hearing: WFL  Vitals: Stable vitals throughout session on room air.     Body Systems and functions:  ROM: WFL all joints  Strength: 4/5 MMT all major muscle groups   Sensation: Mildly impaired (Pt reported tingling in BL finger 
Physical Therapy    Physical Therapy Treatment Note  Name: Nerissa Brewster MRN: [id number] :   1950   Date:  2025   Admission Date: 2025 Room:  [address] [place] [address]   Restrictions/Precautions:          fall risk  Communication with other providers:    Subjective:  Patient states:  agreeable, need to use commode  Pain:   Location, Type, Intensity (0/10 to 10/10):  does not rate    Objective:    Observation:  in chair  Treatment, including education/measures:  STS and SPT /c SW CGA/Bry and vc for safe techs x multiple sets  Stand balance x~5' F-/F grade self care tasks  Gait training using FWW x~4', 4' /c CGA, vc for safe techs  Safety  Patient left safely in the chair, with call light/phone in reach with alarm applied. Gait belt was used for transfers and gait.    Assessment / Impression:    Patient's tolerance of treatment:  good   Adverse Reaction: na  Significant change in status and impact:  na  Barriers to improvement:  weakness and endurance  Plan for Next Session:    Cont gait progression                Time in:  1410  Time out:  1440  Timed treatment minutes:  30  Total treatment time:  30    Previously filed items:  Social/Functional History  Lives With: Alone  Type of Home: Assisted living  Home Layout: One level  Home Access: Level entry  Bathroom Shower/Tub: Walk-in shower  Bathroom Toilet: Standard  Bathroom Equipment: Grab bars in shower, Shower chair  Bathroom Accessibility: Accessible  Home Equipment: Wheelchair - Manual, Walker - Rolling  Has the patient had two or more falls in the past year or any fall with injury in the past year?: Yes  Prior Level of Assist for ADLs: Needs assistance (able to stand pivot transfer to wheelchair, showers, requires assistance for dressing)  Prior Level of Assist for Homemaking: Needs assistance  Homemaking Responsibilities: No  Prior Level of Assist for Ambulation: Independent household ambulator, with or without device, Independent community ambulator, 
Dictated and Electronically Signed By: Tristan Gordon MD 1/23/2025 19:04        CT HEAD WO CONTRAST    Result Date: 1/23/2025  PROCEDURE: CT HEAD WO CONTRAST DATE OF EXAM:  1/23/2025 18:53 DEMOGRAPHICS: 74 years old Female INDICATION: fall x 1 week ago, gen weakness COMPARISON: No existing relevant imaging study corresponding to the same anatomical region is available. TECHNIQUE: Contiguous axial slices of the head were submitted without IV contrast.   DOSE OPTIMIZATION: CT radiation dose optimization techniques (automated exposure  control, and use of iterative reconstruction techniques, or adjustment of the mA and/or kV according to patient size) were used to limit patient radiation dose. FINDINGS: No acute intracranial hemorrhage. Mild diffuse cerebral volume loss.  Ventricles are normal in size. Basal cisterns and foramen magnum are patent. No midline shift. Patchy hypoattenuating foci throughout the supratentorial white matter, without associated mass effect, likely sequelae of chronic microvascular ischemic change. Old small infarct in the right cerebellum.Gray-white differentiation is preserved.  Internal carotid artery calcifications are noted. Globes are symmetric in size.  Visualized paranasal sinuses and mastoid air cells are well aerated. No suspicious focal lesion of the scalp or bony calvarium. IMPRESSION: No acute intracranial hemorrhage, large territory infarct or mass effect. Sequela of chronic microvascular ischemic change. Old small lacunar infarct in the right cerebellum. This dictation was created with voice recognition software.  While attempts have  been made to review the dictation as it is transcribed, on occasion the spoken word can be misinterpreted by the technology leading to omissions or inappropriate words, phrases or sentences.  Dictated and Electronically Signed By: Mauricio Scott MD 1/23/2025 18:30          CBC:   Recent Labs     01/23/25  1744 01/24/25  0139   WBC 9.3 8.3   HGB 8.9* 
LARGE 01/23/2025 08:50 PM    UROBILINOGEN 0.2 01/23/2025 08:50 PM    BILIRUBINUR NEGATIVE 01/23/2025 08:50 PM    BLOODU NEGATIVE 04/20/2019 11:00 PM    GLUCOSEU NEGATIVE 01/23/2025 08:50 PM    KETUA NEGATIVE 01/23/2025 08:50 PM     Urine Cultures: No results found for: \"LABURIN\"  Blood Cultures: No results found for: \"BC\"  No results found for: \"BLOODCULT2\"  Organism:   Lab Results   Component Value Date/Time    ORG ECOL 07/13/2018 06:35 AM         Electronically signed by PREMA MALONE MD on 1/26/2025 at 12:07 PM

## 2025-04-18 ENCOUNTER — TRANSCRIBE ORDERS (OUTPATIENT)
Dept: ADMINISTRATIVE | Age: 75
End: 2025-04-18

## 2025-04-18 DIAGNOSIS — M48.062 LUMBAR STENOSIS WITH NEUROGENIC CLAUDICATION: Primary | ICD-10-CM

## (undated) DEVICE — CANNULA,OXY,ADULT,SUPER SOFT,W/14'TUB,UC: Brand: MEDLINE INDUSTRIES, INC.

## (undated) DEVICE — GLIDESHEATH SLENDER STAINLESS STEEL KIT: Brand: GLIDESHEATH SLENDER

## (undated) DEVICE — RUNWAY RADL W/TOP PAD

## (undated) DEVICE — PAD, DEFIB, ADULT, RADIOTRANS, ZOLL: Brand: MEDLINE

## (undated) DEVICE — MODEL AT P65, P/N 701554-001KIT CONTENTS: HAND CONTROLLER, 3-WAY HIGH-PRESSURE STOPCOCK WITH ROTATING END AND PREMIUM HIGH-PRESSURE TUBING: Brand: ANGIOTOUCH® KIT

## (undated) DEVICE — ST EXT IV SMARTSITE 2VLV SP M LL 5ML IV1

## (undated) DEVICE — DRSNG SURESITE WNDW 4X4.5

## (undated) DEVICE — GW INQW FIX/CORE PTFE J/3MM .035 260CM

## (undated) DEVICE — ST INF PRI SMRTSTE 20DRP 2VLV 24ML 117

## (undated) DEVICE — DRAPE, RADIAL, STERILE: Brand: MEDLINE

## (undated) DEVICE — RADIFOCUS OPTITORQUE ANGIOGRAPHIC CATHETER: Brand: OPTITORQUE

## (undated) DEVICE — CATH F5 INF PIG145 110CM 6SH: Brand: INFINITI

## (undated) DEVICE — ADULT DISPOSABLE SINGLE-PATIENT USE PULSE OXIMETER SENSOR: Brand: NONIN

## (undated) DEVICE — SKIN PREP TRAY W/CHG: Brand: MEDLINE INDUSTRIES, INC.

## (undated) DEVICE — Z DISCONTINUED (USE MFG CAT MVABO)  TUBING GAS SAMPLING STD 6.5 FT FEMALE CONN SMRT CAPNOLINE

## (undated) DEVICE — TR BAND RADIAL ARTERY COMPRESSION DEVICE: Brand: TR BAND

## (undated) DEVICE — PK CATH CARD 70

## (undated) DEVICE — ENDOSCOPY KIT: Brand: MEDLINE INDUSTRIES, INC.

## (undated) DEVICE — FORCEPS BX L240CM JAW DIA2.8MM L CAP W/ NDL MIC MESH TOOTH

## (undated) DEVICE — A2000 MULTI-USE SYRINGE KIT, P/N 701277-003KIT CONTENTS: 100ML CONTRAST RESERVOIR AND TUBING WITH CONTRAST SPIKE AND CLAMP: Brand: A2000 MULTI-USE SYRINGE KIT